# Patient Record
Sex: FEMALE | Race: WHITE | NOT HISPANIC OR LATINO | Employment: OTHER | URBAN - METROPOLITAN AREA
[De-identification: names, ages, dates, MRNs, and addresses within clinical notes are randomized per-mention and may not be internally consistent; named-entity substitution may affect disease eponyms.]

---

## 2023-04-14 LAB — THYROTROPIN (MIU/L) IN SER/PLAS BY DETECTION LIMIT <= 0.05 MIU/L: 3.2 MIU/L (ref 0.44–3.98)

## 2023-09-08 PROBLEM — S00.411A ABRASION OF RIGHT EAR CANAL: Status: ACTIVE | Noted: 2023-09-08

## 2023-09-08 PROBLEM — Z98.61 HISTORY OF PTCA: Status: ACTIVE | Noted: 2023-09-08

## 2023-09-08 PROBLEM — I00 RHEUMATIC FEVER: Status: ACTIVE | Noted: 2023-09-08

## 2023-09-08 PROBLEM — M79.674 CHRONIC PAIN OF TOE OF RIGHT FOOT: Status: ACTIVE | Noted: 2023-09-08

## 2023-09-08 PROBLEM — I25.10 CAD (CORONARY ARTERY DISEASE): Status: ACTIVE | Noted: 2023-09-08

## 2023-09-08 PROBLEM — G89.29 CHRONIC PAIN OF TOE OF RIGHT FOOT: Status: ACTIVE | Noted: 2023-09-08

## 2023-09-08 PROBLEM — M20.42 ACQUIRED HAMMERTOE OF LEFT FOOT: Status: ACTIVE | Noted: 2023-09-08

## 2023-09-08 PROBLEM — M85.80 OSTEOPENIA: Status: ACTIVE | Noted: 2023-09-08

## 2023-09-08 PROBLEM — G89.29 CHRONIC PAIN OF TOE OF LEFT FOOT: Status: ACTIVE | Noted: 2023-09-08

## 2023-09-08 PROBLEM — H61.21 IMPACTED CERUMEN OF RIGHT EAR: Status: ACTIVE | Noted: 2023-09-08

## 2023-09-08 PROBLEM — I10 HTN (HYPERTENSION): Status: ACTIVE | Noted: 2023-09-08

## 2023-09-08 PROBLEM — D64.9 ANEMIA: Status: ACTIVE | Noted: 2023-09-08

## 2023-09-08 PROBLEM — E78.5 HLD (HYPERLIPIDEMIA): Status: ACTIVE | Noted: 2023-09-08

## 2023-09-08 PROBLEM — R26.89 BALANCE DISORDER: Status: ACTIVE | Noted: 2023-09-08

## 2023-09-08 PROBLEM — M79.675 CHRONIC PAIN OF TOE OF LEFT FOOT: Status: ACTIVE | Noted: 2023-09-08

## 2023-09-08 PROBLEM — R79.89 ABNORMAL TSH: Status: ACTIVE | Noted: 2023-09-08

## 2023-09-08 PROBLEM — F41.9 ANXIETY DISORDER, UNSPECIFIED: Status: ACTIVE | Noted: 2023-09-08

## 2023-09-08 PROBLEM — Z95.2 S/P TAVR (TRANSCATHETER AORTIC VALVE REPLACEMENT): Status: ACTIVE | Noted: 2023-09-08

## 2023-09-08 PROBLEM — R42 ORTHOSTATIC DIZZINESS: Status: ACTIVE | Noted: 2023-09-08

## 2023-09-08 RX ORDER — ALPRAZOLAM 0.25 MG/1
0.25 TABLET ORAL 2 TIMES DAILY PRN
COMMUNITY
Start: 2018-03-04 | End: 2023-10-27 | Stop reason: SDUPTHER

## 2023-09-08 RX ORDER — METOPROLOL SUCCINATE 50 MG/1
1 TABLET, EXTENDED RELEASE ORAL DAILY
COMMUNITY
End: 2023-10-27 | Stop reason: ALTCHOICE

## 2023-09-08 RX ORDER — TRIAMCINOLONE ACETONIDE 1 MG/G
1 CREAM TOPICAL
COMMUNITY
Start: 2020-11-27 | End: 2023-10-27 | Stop reason: ALTCHOICE

## 2023-09-08 RX ORDER — HYDROCHLOROTHIAZIDE 25 MG/1
1 TABLET ORAL DAILY
COMMUNITY
End: 2023-10-27 | Stop reason: ALTCHOICE

## 2023-09-08 RX ORDER — LATANOPROST 50 UG/ML
1 SOLUTION/ DROPS OPHTHALMIC NIGHTLY
COMMUNITY
Start: 2017-08-13

## 2023-09-08 RX ORDER — EZETIMIBE 10 MG/1
1 TABLET ORAL NIGHTLY
COMMUNITY
Start: 2018-06-29 | End: 2023-12-28

## 2023-09-08 RX ORDER — ASPIRIN 81 MG/1
81 TABLET ORAL DAILY
COMMUNITY
Start: 2018-06-29 | End: 2024-04-11 | Stop reason: SDUPTHER

## 2023-09-08 RX ORDER — NITROGLYCERIN 0.4 MG/1
0.4 TABLET SUBLINGUAL EVERY 5 MIN PRN
COMMUNITY
Start: 2018-06-29 | End: 2024-04-26 | Stop reason: SDUPTHER

## 2023-09-08 RX ORDER — AMLODIPINE BESYLATE 5 MG/1
1 TABLET ORAL DAILY
COMMUNITY
Start: 2021-05-21 | End: 2024-01-10 | Stop reason: SDUPTHER

## 2023-09-08 RX ORDER — COLISTIN SULFATE, NEOMYCIN SULFATE, THONZONIUM BROMIDE AND HYDROCORTISONE ACETATE 3; 3.3; .5; 1 MG/ML; MG/ML; MG/ML; MG/ML
4 SUSPENSION AURICULAR (OTIC) 4 TIMES DAILY
COMMUNITY
Start: 2022-08-19 | End: 2023-10-27 | Stop reason: ALTCHOICE

## 2023-09-08 RX ORDER — HYDROCORTISONE 25 MG/ML
1 LOTION TOPICAL 2 TIMES DAILY
COMMUNITY
Start: 2019-12-05

## 2023-09-08 RX ORDER — ATORVASTATIN CALCIUM 20 MG/1
1 TABLET, FILM COATED ORAL NIGHTLY
COMMUNITY
Start: 2018-03-08 | End: 2023-12-28

## 2023-09-08 RX ORDER — VIT C/E/ZN/COPPR/LUTEIN/ZEAXAN 250MG-90MG
25 CAPSULE ORAL DAILY
COMMUNITY

## 2023-09-08 RX ORDER — DORZOLAMIDE HYDROCHLORIDE AND TIMOLOL MALEATE 20; 5 MG/ML; MG/ML
1 SOLUTION/ DROPS OPHTHALMIC 2 TIMES DAILY
COMMUNITY
Start: 2017-12-26

## 2023-09-08 RX ORDER — IRBESARTAN AND HYDROCHLOROTHIAZIDE 150; 12.5 MG/1; MG/1
1 TABLET, FILM COATED ORAL DAILY
COMMUNITY
Start: 2022-03-25 | End: 2024-03-04 | Stop reason: SDUPTHER

## 2023-10-12 ENCOUNTER — OFFICE VISIT (OUTPATIENT)
Dept: PRIMARY CARE | Facility: CLINIC | Age: 83
End: 2023-10-12
Payer: MEDICARE

## 2023-10-12 VITALS
HEIGHT: 69 IN | SYSTOLIC BLOOD PRESSURE: 113 MMHG | DIASTOLIC BLOOD PRESSURE: 49 MMHG | BODY MASS INDEX: 19.55 KG/M2 | HEART RATE: 77 BPM | TEMPERATURE: 97.2 F | WEIGHT: 132 LBS

## 2023-10-12 DIAGNOSIS — Z00.00 ENCOUNTER FOR ANNUAL WELLNESS EXAM IN MEDICARE PATIENT: Primary | ICD-10-CM

## 2023-10-12 DIAGNOSIS — D64.89 ANEMIA DUE TO OTHER CAUSE, NOT CLASSIFIED: ICD-10-CM

## 2023-10-12 DIAGNOSIS — E78.49 OTHER HYPERLIPIDEMIA: ICD-10-CM

## 2023-10-12 DIAGNOSIS — I10 PRIMARY HYPERTENSION: ICD-10-CM

## 2023-10-12 PROCEDURE — 3074F SYST BP LT 130 MM HG: CPT | Performed by: FAMILY MEDICINE

## 2023-10-12 PROCEDURE — 3078F DIAST BP <80 MM HG: CPT | Performed by: FAMILY MEDICINE

## 2023-10-12 PROCEDURE — G0439 PPPS, SUBSEQ VISIT: HCPCS | Performed by: FAMILY MEDICINE

## 2023-10-13 DIAGNOSIS — F41.9 ANXIETY: ICD-10-CM

## 2023-10-13 RX ORDER — ALPRAZOLAM 0.25 MG/1
0.25 TABLET ORAL 2 TIMES DAILY PRN
Qty: 30 TABLET | Refills: 0 | Status: CANCELLED | OUTPATIENT
Start: 2023-10-13 | End: 2023-10-28

## 2023-10-13 ASSESSMENT — ENCOUNTER SYMPTOMS
HEMATOLOGIC/LYMPHATIC NEGATIVE: 1
NEUROLOGICAL NEGATIVE: 1
CONSTITUTIONAL NEGATIVE: 1
PSYCHIATRIC NEGATIVE: 1
MUSCULOSKELETAL NEGATIVE: 1
ENDOCRINE NEGATIVE: 1
EYES NEGATIVE: 1
ALLERGIC/IMMUNOLOGIC NEGATIVE: 1
RESPIRATORY NEGATIVE: 1
CARDIOVASCULAR NEGATIVE: 1
GASTROINTESTINAL NEGATIVE: 1

## 2023-10-13 NOTE — PROGRESS NOTES
Subjective the patient is here for an annual wellness visit.  He is about 15 to 20 minutes late for her appointment.  She states that she has overall been doing well.  She continues on her meds as noted.  She sees Dr. Gerald Hughes for her cardiology care.  She also sees urology and is currently on Gemtesa for her bladder control.  She has no other complaints at the present time.    Patient ID: Betty Stein is a 83 y.o. female who presents for No chief complaint on file.:    Problem List Items Addressed This Visit    None     Past Medical History:   Diagnosis Date    Encounter for examination for normal comparison and control in clinical research program 01/21/2019    Research exam    Inflamed seborrheic keratosis 05/14/2020    Seborrheic keratoses, inflamed    Ingrowing nail 04/15/2021    Ingrown toenail    Nonrheumatic aortic (valve) stenosis 05/10/2019    Aortic stenosis, severe    Personal history of other diseases of the musculoskeletal system and connective tissue 11/11/2021    History of bone disorder      Past Surgical History:   Procedure Laterality Date    OTHER SURGICAL HISTORY  06/29/2018    Hip Replacement Bilateral    OTHER SURGICAL HISTORY  11/11/2021    Surgery    OTHER SURGICAL HISTORY  11/11/2021    Cataract surgery    OTHER SURGICAL HISTORY  11/11/2021    Appendectomy    OTHER SURGICAL HISTORY  11/11/2021    Cardiac catheterization    OTHER SURGICAL HISTORY  11/11/2021    Transcatheter aortic valve replacement    OTHER SURGICAL HISTORY  11/11/2021    Percutaneous transluminal coronary angioplasty    OTHER SURGICAL HISTORY  11/11/2021    Phacoemulsification of cataract and insertion of intraocular lens    OTHER SURGICAL HISTORY  11/18/2021    Colonoscopy      Family History   Problem Relation Name Age of Onset    Coronary artery disease Mother        Social History     Socioeconomic History    Marital status:      Spouse name: Not on file    Number of children: Not on file    Years of  education: Not on file    Highest education level: Not on file   Occupational History    Not on file   Tobacco Use    Smoking status: Not on file    Smokeless tobacco: Not on file   Substance and Sexual Activity    Alcohol use: Not on file    Drug use: Not on file    Sexual activity: Not on file   Other Topics Concern    Not on file   Social History Narrative    Not on file     Social Determinants of Health     Financial Resource Strain: Not on file   Food Insecurity: Not on file   Transportation Needs: Not on file   Physical Activity: Not on file   Stress: Not on file   Social Connections: Not on file   Intimate Partner Violence: Not on file   Housing Stability: Not on file      Patient has no known allergies.   Current Outpatient Medications   Medication Sig Dispense Refill    ALPRAZolam (Xanax) 0.25 mg tablet Take 1 tablet (0.25 mg) by mouth 2 times a day as needed.      amLODIPine (Norvasc) 5 mg tablet Take 1 tablet (5 mg) by mouth once daily.      aspirin 81 mg EC tablet Take 1 tablet (81 mg) by mouth once daily.      atorvastatin (Lipitor) 20 mg tablet Take 1 tablet (20 mg) by mouth once daily at bedtime.      carbamide peroxide (Debrox) 6.5 % otic solution Administer into affected ear(s). PER DIRECTED      cholecalciferol (Vitamin D-3) 25 MCG (1000 UT) capsule Take 1 capsule (25 mcg) by mouth once daily.      dorzolamide-timoloL (Cosopt) 22.3-6.8 mg/mL ophthalmic solution Administer 1 drop into affected eye(s) twice a day.      ezetimibe (Zetia) 10 mg tablet Take 1 tablet (10 mg) by mouth once daily at bedtime.      hydroCHLOROthiazide (HYDRODiuril) 25 mg tablet Take 1 tablet (25 mg) by mouth once daily.      hydrocortisone 2.5 % lotion Apply 1 Application topically twice a day.      irbesartan-hydrochlorothiazide (Avalide) 150-12.5 mg tablet Take 1 tablet by mouth once daily.      latanoprost (Xalatan) 0.005 % ophthalmic solution Administer 1 drop into both eyes once daily at bedtime.      metoprolol  succinate XL (Toprol-XL) 50 mg 24 hr tablet Take 1 tablet (50 mg) by mouth once daily.      multivit-min/ferrous fumarate (MULTI VITAMIN ORAL) Take 1 tablet by mouth once daily.      neomycin-colist-HC-thonzonium (Cortisporin-TC) 3.3-3-10-0.5 mg/mL otic suspension Administer 4 drops into affected ear(s) 4 times a day. PER DIRECTED      nitroglycerin (Nitrostat) 0.4 mg SL tablet Place 1 tablet (0.4 mg) under the tongue every 5 minutes if needed for chest pain. TAKE PER DIRECTED      NON FORMULARY POTASSIUM 99 MG ORAL TABLET,, TAKE 1 TAB DAILY PER DIRECTED      triamcinolone (Kenalog) 0.1 % cream Apply 1 Application topically. PER DIRECTED       No current facility-administered medications for this visit.       Immunization History   Administered Date(s) Administered    Influenza, High Dose Seasonal, Preservative Free 08/27/2020    Influenza, Unspecified 10/15/2009, 09/23/2011, 10/01/2012, 10/01/2013, 09/01/2014, 09/01/2015, 10/01/2016, 10/01/2017, 09/01/2019, 10/14/2021    Novel influenza-H1N1-09, preservative-free 01/11/2010    Pfizer Purple Cap SARS-CoV-2 01/26/2021, 02/15/2021, 09/27/2021, 09/16/2022    Pneumococcal conjugate vaccine, 13-valent (PREVNAR 13) 11/24/2015    Pneumococcal polysaccharide vaccine, 23-valent, age 2 years and older (PNEUMOVAX 23) 10/01/2012, 10/17/2014    SARS-CoV-2, Unspecified 04/30/2022    Td (adult), unspecified 06/03/2000    Zoster vaccine, recombinant, adult (SHINGRIX) 02/26/2020, 07/23/2020    Zoster, Unspecified 07/28/2000    Zoster, live 11/27/2007        Review of Systems   Constitutional: Negative.    HENT: Negative.     Eyes: Negative.    Respiratory: Negative.     Cardiovascular: Negative.    Gastrointestinal: Negative.    Endocrine: Negative.    Musculoskeletal: Negative.    Allergic/Immunologic: Negative.    Neurological: Negative.    Hematological: Negative.    Psychiatric/Behavioral: Negative.     All other systems reviewed and are negative.       Vitals:    10/12/23 1053    BP: (!) 113/49   Pulse: 77   Temp: 36.2 °C (97.2 °F)     Vitals:    10/12/23 1053   Weight: 59.9 kg (132 lb)      Physical Exam  Constitutional:       General: She is not in acute distress.     Appearance: Normal appearance.   Neck:      Vascular: No carotid bruit.   Cardiovascular:      Rate and Rhythm: Normal rate and regular rhythm.      Pulses: Normal pulses.      Heart sounds: Normal heart sounds.   Pulmonary:      Effort: Pulmonary effort is normal.      Breath sounds: Normal breath sounds.   Musculoskeletal:      Cervical back: Neck supple.   Neurological:      Mental Status: She is alert.     Ears-there is cerumen in both ear canals which does not appear totally obstructing.  Patient states her hearing has been normal    ASSESSMENT/PLAN: Annual wellness visit  Hypertension stable  Hyperlipidemia  Chronic anxiety disorder  Urinary frequency improved    Continue current meds noted  Check CBC CMP lipid profile and TSH  Mammograms are up-to-date   Follow-up with cardiology as scheduled  Exercise regularly  Follow-up in 6 months and call as needed

## 2023-10-16 DIAGNOSIS — D64.9 ANEMIA, UNSPECIFIED TYPE: Primary | ICD-10-CM

## 2023-10-16 DIAGNOSIS — E78.49 OTHER HYPERLIPIDEMIA: ICD-10-CM

## 2023-10-27 ENCOUNTER — OFFICE VISIT (OUTPATIENT)
Dept: CARDIOLOGY | Facility: CLINIC | Age: 83
End: 2023-10-27
Payer: MEDICARE

## 2023-10-27 ENCOUNTER — TELEPHONE (OUTPATIENT)
Dept: CARDIOLOGY | Facility: CLINIC | Age: 83
End: 2023-10-27

## 2023-10-27 VITALS
TEMPERATURE: 98 F | HEART RATE: 80 BPM | HEIGHT: 69 IN | BODY MASS INDEX: 19.85 KG/M2 | WEIGHT: 134 LBS | SYSTOLIC BLOOD PRESSURE: 103 MMHG | DIASTOLIC BLOOD PRESSURE: 57 MMHG

## 2023-10-27 DIAGNOSIS — F41.1 GENERALIZED ANXIETY DISORDER: Primary | ICD-10-CM

## 2023-10-27 DIAGNOSIS — I25.10 CORONARY ARTERY DISEASE INVOLVING NATIVE CORONARY ARTERY OF NATIVE HEART WITHOUT ANGINA PECTORIS: ICD-10-CM

## 2023-10-27 DIAGNOSIS — E78.2 MIXED HYPERLIPIDEMIA: ICD-10-CM

## 2023-10-27 DIAGNOSIS — Z95.2 S/P TAVR (TRANSCATHETER AORTIC VALVE REPLACEMENT): Primary | ICD-10-CM

## 2023-10-27 DIAGNOSIS — I10 PRIMARY HYPERTENSION: ICD-10-CM

## 2023-10-27 DIAGNOSIS — Z78.9 NEVER SMOKED ANY SUBSTANCE: ICD-10-CM

## 2023-10-27 DIAGNOSIS — Z98.61 HISTORY OF PTCA: ICD-10-CM

## 2023-10-27 PROCEDURE — 3078F DIAST BP <80 MM HG: CPT | Performed by: INTERNAL MEDICINE

## 2023-10-27 PROCEDURE — 1036F TOBACCO NON-USER: CPT | Performed by: INTERNAL MEDICINE

## 2023-10-27 PROCEDURE — 1160F RVW MEDS BY RX/DR IN RCRD: CPT | Performed by: INTERNAL MEDICINE

## 2023-10-27 PROCEDURE — 1159F MED LIST DOCD IN RCRD: CPT | Performed by: INTERNAL MEDICINE

## 2023-10-27 PROCEDURE — 99214 OFFICE O/P EST MOD 30 MIN: CPT | Performed by: INTERNAL MEDICINE

## 2023-10-27 PROCEDURE — 3074F SYST BP LT 130 MM HG: CPT | Performed by: INTERNAL MEDICINE

## 2023-10-27 RX ORDER — VIBEGRON 75 MG/1
1 TABLET, FILM COATED ORAL DAILY
COMMUNITY
Start: 2023-09-22

## 2023-10-27 RX ORDER — ALPRAZOLAM 0.25 MG/1
0.25 TABLET ORAL 2 TIMES DAILY PRN
Qty: 20 TABLET | Refills: 0 | Status: SHIPPED | OUTPATIENT
Start: 2023-10-27 | End: 2024-04-11 | Stop reason: SDUPTHER

## 2023-10-27 ASSESSMENT — ENCOUNTER SYMPTOMS
FATIGUE: 1
ARTHRALGIAS: 1
FREQUENCY: 1
APPETITE CHANGE: 0
GASTROINTESTINAL NEGATIVE: 1
ACTIVITY CHANGE: 1
DYSURIA: 0
DIZZINESS: 0
CARDIOVASCULAR NEGATIVE: 1
BACK PAIN: 1
SLEEP DISTURBANCE: 1

## 2023-10-27 NOTE — TELEPHONE ENCOUNTER
Pt was here for an appt with dr ramsey.  She wanted to request a written rx for alprazolam to take to the pharmacy. Informed pt we normally send it electronically to their preferred pharmacy but she said she would prefer to have a written prescription. Please advise

## 2023-10-27 NOTE — PATIENT INSTRUCTIONS
Monitor blood pressures. If always < 110/90 let me know and will get rid of the hydrochlorothiazide with your irbesartan  Follow-up in 6 months if problems.

## 2023-10-27 NOTE — TELEPHONE ENCOUNTER
Sp to pt's . He said that is fine and wanted to know if could please be sent to [facility] in [place]

## 2023-10-27 NOTE — PROGRESS NOTES
Patient:  Betty Stein  YOB: 1940  MRN: [id number]       Chief Complaint/Active Symptoms:       Betty Stein is a 83 y.o. female who returns today for cardiac follow-up.    Here for routine follow-up. Getting ready to move in 2 years to [facility] in independent living. Having to clear out stuff from 20 years of their home. Having help from the children.    Having problems with arthritis and mostly her back. No chest pain or shortness or breath. No edema. No dizziness or lightheadedness.     Review of Systems   Constitutional:  Positive for activity change and fatigue. Negative for appetite change.   Cardiovascular: Negative.    Gastrointestinal: Negative.    Genitourinary:  Positive for frequency. Negative for dysuria.   Musculoskeletal:  Positive for arthralgias and back pain.   Neurological:  Negative for dizziness and syncope.   Psychiatric/Behavioral:  Positive for sleep disturbance.        Objective:     Vitals:    10/27/23 1116   BP: 103/57   Pulse: 80   Temp: 36.7 °C (98 °F)       Vitals:    10/27/23 1116   Weight: 60.8 kg (134 lb)       Allergies:     No Known Allergies       Medications:     Current Outpatient Medications   Medication Instructions    ALPRAZolam (XANAX) 0.25 mg, oral, 2 times daily PRN    amLODIPine (Norvasc) 5 mg tablet 1 tablet, oral, Daily    aspirin 81 mg, oral, Daily    atorvastatin (Lipitor) 20 mg tablet 1 tablet, oral, Nightly    cholecalciferol (VITAMIN D-3) 25 mcg, oral, Daily    dorzolamide-timoloL (Cosopt) 22.3-6.8 mg/mL ophthalmic solution 1 drop, ophthalmic (eye), 2 times daily    ezetimibe (Zetia) 10 mg tablet 1 tablet, oral, Nightly    Gemtesa 75 mg tablet 1 tablet, oral, Daily    hydrocortisone 2.5 % lotion 1 Application, Topical, 2 times daily    irbesartan-hydrochlorothiazide (Avalide) 150-12.5 mg tablet 1 tablet, oral, Daily    latanoprost (Xalatan) 0.005 % ophthalmic solution 1 drop, Both Eyes, Nightly    multivit-min/ferrous fumarate (MULTI  "VITAMIN ORAL) 1 tablet, oral, Daily    nitroglycerin (NITROSTAT) 0.4 mg, sublingual, Every 5 min PRN, TAKE PER DIRECTED    NON FORMULARY POTASSIUM 99 MG ORAL TABLET,, TAKE 1 TAB DAILY PER DIRECTED       Physical Examination:   GENERAL:  Well developed, well nourished, in no acute distress.  HEENT: NC AT, EOMI with anicteric sclera  NECK: Range of motion, no JVD, no bruit.  CHEST:  Symmetric and nontender.  LUNGS:  Clear to auscultation bilaterally, normal respiratory effort.  HEART: Nondisplaced.  There is a regular rhythm with a normal S1 and S2 no S3.  There is a soft systolic ejection murmur at the upper sternal border no diastolic murmur.    ABDOMEN: Soft, NT, ND without palpable organomegaly or bruits  EXTREMITIES:  Warm with good color, no clubbing or cyanosis.  There is no edema noted.  PERIPHERAL VASCULAR:  Pulses present and equally palpable; pedal's but feet are warm with normal cap refill   MUSCULOSKELETAL: No changes  NEURO/PSYCH:  Alert and oriented times three with approppriate behavior and responses. Nonfocal motor examination with normal gait and ambulation  Lymph: No significant palpable lymphadenopathy  Skin: no rash or lesions on exposed skin or reported.    Lab:     CBC:   Lab Results   Component Value Date    WBC 6.9 09/09/2022    RBC 4.80 09/09/2022    HGB 14.9 09/09/2022    HCT 46.6 (H) 09/09/2022     09/09/2022        CMP:    Lab Results   Component Value Date     09/09/2022    K 4.0 09/09/2022     09/09/2022    CO2 30 09/09/2022    BUN 31 (H) 09/09/2022    CREATININE 1.06 (H) 09/09/2022    GLUCOSE 102 (H) 09/09/2022    CALCIUM 9.7 09/09/2022       Magnesium:    No results found for: \"MG\"    Lipid Profile:    Lab Results   Component Value Date    TRIG 131 09/09/2022    HDL 62.0 09/09/2022       TSH:    Lab Results   Component Value Date    TSH 3.20 04/14/2023       BNP:   Lab Results   Component Value Date    BNP 97 08/20/2020        PT/INR:    Lab Results   Component Value " "Date    PROTIME 11.5 01/10/2022    INR 1.0 01/10/2022       HgBA1c:    No results found for: \"HGBA1C\"    BMP:  Lab Results   Component Value Date     09/09/2022     01/10/2022     10/15/2021    K 4.0 09/09/2022    K 4.0 01/10/2022    K 4.3 10/15/2021     09/09/2022     01/10/2022     10/15/2021    CO2 30 09/09/2022    CO2 25 01/10/2022    CO2 30 10/15/2021    BUN 31 (H) 09/09/2022    BUN 39 (H) 01/10/2022    BUN 34 (H) 10/15/2021    CREATININE 1.06 (H) 09/09/2022    CREATININE 1.11 (H) 01/10/2022    CREATININE 1.17 (H) 10/15/2021       Cardiac Enzymes:    No results found for: \"TROPHS\"    Hepatic Function Panel:    Lab Results   Component Value Date    ALKPHOS 60 09/09/2022    ALT 17 09/09/2022    AST 24 09/09/2022    PROT 7.6 09/09/2022    BILITOT 0.8 09/09/2022         Diagnostic Studies:     No echocardiogram results found for the past 12 months    No nuclear medicine results found for the past 12 months    No valid procedures specified.    EKG:   No results found for: \"EKG\"    Radiology:     No orders to display         ASSESSMENT     Problem List Items Addressed This Visit       CAD (coronary artery disease)    History of PTCA    HLD (hyperlipidemia)    HTN (hypertension)    S/P TAVR (transcatheter aortic valve replacement) - Primary    Never smoked any substance     Other Visit Diagnoses       Body mass index (BMI) of 19.0-19.9 in adult                PLAN   1.  Coronary artery disease with history of coronary intervention stable without angina pectoris continue conservative medical therapy and risk factor modification.  2.  History of aortic stenosis status post TAVR continue with antibiotic prophylaxis.  We will repeat her echocardiogram with her next visit.  3.  Hypertension.  Blood pressures are actually little on the low side if they remain low we will get rid of the hydrochlorothiazide and then consider decreasing the dose of her amlodipine if needed.  4.  " Hyperlipidemia.  Patient is overdue for lab work including her lipid panel continue her statins at the current dose in the interim.  5.  Tobacco and weight status.  Patient is a non-smoker and at the lower end of her ideal body weight.    We have asked her to get her lab work and cannot renew her medications until she is done so as has been greater than a year and if she is stable we will see her in follow-up in 6 months

## 2023-10-30 ENCOUNTER — LAB (OUTPATIENT)
Dept: LAB | Facility: LAB | Age: 83
End: 2023-10-30
Payer: MEDICARE

## 2023-10-30 DIAGNOSIS — Z00.00 ENCOUNTER FOR ANNUAL WELLNESS EXAM IN MEDICARE PATIENT: ICD-10-CM

## 2023-10-30 DIAGNOSIS — D64.9 ANEMIA, UNSPECIFIED TYPE: ICD-10-CM

## 2023-10-30 DIAGNOSIS — D64.89 ANEMIA DUE TO OTHER CAUSE, NOT CLASSIFIED: ICD-10-CM

## 2023-10-30 DIAGNOSIS — I10 PRIMARY HYPERTENSION: ICD-10-CM

## 2023-10-30 DIAGNOSIS — E78.49 OTHER HYPERLIPIDEMIA: ICD-10-CM

## 2023-10-30 LAB
ALBUMIN SERPL BCP-MCNC: 4 G/DL (ref 3.4–5)
ALP SERPL-CCNC: 67 U/L (ref 33–136)
ALT SERPL W P-5'-P-CCNC: 49 U/L (ref 7–45)
ANION GAP SERPL CALC-SCNC: 13 MMOL/L (ref 10–20)
AST SERPL W P-5'-P-CCNC: 48 U/L (ref 9–39)
BILIRUB SERPL-MCNC: 0.6 MG/DL (ref 0–1.2)
BUN SERPL-MCNC: 21 MG/DL (ref 6–23)
CALCIUM SERPL-MCNC: 9.3 MG/DL (ref 8.6–10.3)
CHLORIDE SERPL-SCNC: 99 MMOL/L (ref 98–107)
CHOLEST SERPL-MCNC: 164 MG/DL (ref 0–199)
CHOLESTEROL/HDL RATIO: 3.3
CO2 SERPL-SCNC: 28 MMOL/L (ref 21–32)
CREAT SERPL-MCNC: 1.08 MG/DL (ref 0.5–1.05)
ERYTHROCYTE [DISTWIDTH] IN BLOOD BY AUTOMATED COUNT: 12.2 % (ref 11.5–14.5)
GFR SERPL CREATININE-BSD FRML MDRD: 51 ML/MIN/1.73M*2
GLUCOSE SERPL-MCNC: 111 MG/DL (ref 74–99)
HCT VFR BLD AUTO: 41.6 % (ref 36–46)
HDLC SERPL-MCNC: 49 MG/DL
HGB BLD-MCNC: 13.4 G/DL (ref 12–16)
LDLC SERPL CALC-MCNC: 88 MG/DL
MCH RBC QN AUTO: 30.6 PG (ref 26–34)
MCHC RBC AUTO-ENTMCNC: 32.2 G/DL (ref 32–36)
MCV RBC AUTO: 95 FL (ref 80–100)
NON HDL CHOLESTEROL: 115 MG/DL (ref 0–149)
NRBC BLD-RTO: 0 /100 WBCS (ref 0–0)
PLATELET # BLD AUTO: 177 X10*3/UL (ref 150–450)
PMV BLD AUTO: 11 FL (ref 7.5–11.5)
POTASSIUM SERPL-SCNC: 3.6 MMOL/L (ref 3.5–5.3)
PROT SERPL-MCNC: 6.7 G/DL (ref 6.4–8.2)
RBC # BLD AUTO: 4.38 X10*6/UL (ref 4–5.2)
SODIUM SERPL-SCNC: 136 MMOL/L (ref 136–145)
T4 FREE SERPL-MCNC: 0.94 NG/DL (ref 0.61–1.12)
TRIGL SERPL-MCNC: 134 MG/DL (ref 0–149)
TSH SERPL-ACNC: 7.27 MIU/L (ref 0.44–3.98)
VLDL: 27 MG/DL (ref 0–40)
WBC # BLD AUTO: 8.3 X10*3/UL (ref 4.4–11.3)

## 2023-10-30 PROCEDURE — 84443 ASSAY THYROID STIM HORMONE: CPT

## 2023-10-30 PROCEDURE — 85027 COMPLETE CBC AUTOMATED: CPT

## 2023-10-30 PROCEDURE — 36415 COLL VENOUS BLD VENIPUNCTURE: CPT

## 2023-10-30 PROCEDURE — 84439 ASSAY OF FREE THYROXINE: CPT

## 2023-10-30 PROCEDURE — 80053 COMPREHEN METABOLIC PANEL: CPT

## 2023-10-30 PROCEDURE — 80061 LIPID PANEL: CPT

## 2023-10-31 ENCOUNTER — TELEPHONE (OUTPATIENT)
Dept: PRIMARY CARE | Facility: CLINIC | Age: 83
End: 2023-10-31
Payer: MEDICARE

## 2023-10-31 NOTE — TELEPHONE ENCOUNTER
----- Message from Ronald Lowry MD sent at 10/30/2023  4:42 PM EDT -----  Thyroid level low--sched OV 1-2 weeks

## 2023-11-01 ENCOUNTER — TELEPHONE (OUTPATIENT)
Dept: PRIMARY CARE | Facility: CLINIC | Age: 83
End: 2023-11-01
Payer: MEDICARE

## 2023-11-01 NOTE — TELEPHONE ENCOUNTER
Patient would like to change her medications for her thyroid. She would like to get on something to correct her low thyroid. She is currently scheduled for December 7th but would like to start on a medication sooner than that.

## 2023-11-06 ENCOUNTER — OFFICE VISIT (OUTPATIENT)
Dept: PRIMARY CARE | Facility: CLINIC | Age: 83
End: 2023-11-06
Payer: MEDICARE

## 2023-11-06 VITALS
TEMPERATURE: 97.5 F | SYSTOLIC BLOOD PRESSURE: 99 MMHG | HEIGHT: 69 IN | DIASTOLIC BLOOD PRESSURE: 53 MMHG | WEIGHT: 134.8 LBS | HEART RATE: 74 BPM | BODY MASS INDEX: 19.96 KG/M2

## 2023-11-06 DIAGNOSIS — R79.89 ABNORMAL TSH: Primary | ICD-10-CM

## 2023-11-06 DIAGNOSIS — E03.9 ACQUIRED HYPOTHYROIDISM: ICD-10-CM

## 2023-11-06 PROCEDURE — 1160F RVW MEDS BY RX/DR IN RCRD: CPT | Performed by: FAMILY MEDICINE

## 2023-11-06 PROCEDURE — 99213 OFFICE O/P EST LOW 20 MIN: CPT | Performed by: FAMILY MEDICINE

## 2023-11-06 PROCEDURE — 3074F SYST BP LT 130 MM HG: CPT | Performed by: FAMILY MEDICINE

## 2023-11-06 PROCEDURE — 3078F DIAST BP <80 MM HG: CPT | Performed by: FAMILY MEDICINE

## 2023-11-06 PROCEDURE — 1036F TOBACCO NON-USER: CPT | Performed by: FAMILY MEDICINE

## 2023-11-06 PROCEDURE — 1159F MED LIST DOCD IN RCRD: CPT | Performed by: FAMILY MEDICINE

## 2023-11-06 ASSESSMENT — PATIENT HEALTH QUESTIONNAIRE - PHQ9
1. LITTLE INTEREST OR PLEASURE IN DOING THINGS: NOT AT ALL
2. FEELING DOWN, DEPRESSED OR HOPELESS: NOT AT ALL
SUM OF ALL RESPONSES TO PHQ9 QUESTIONS 1 AND 2: 0

## 2023-11-07 ASSESSMENT — ENCOUNTER SYMPTOMS
CARDIOVASCULAR NEGATIVE: 1
GASTROINTESTINAL NEGATIVE: 1
RESPIRATORY NEGATIVE: 1
HEMATOLOGIC/LYMPHATIC NEGATIVE: 1
ENDOCRINE NEGATIVE: 1
ALLERGIC/IMMUNOLOGIC NEGATIVE: 1
EYES NEGATIVE: 1
PSYCHIATRIC NEGATIVE: 1
CONSTITUTIONAL NEGATIVE: 1
MUSCULOSKELETAL NEGATIVE: 1
NEUROLOGICAL NEGATIVE: 1

## 2023-11-07 NOTE — PROGRESS NOTES
Subjective Betty Dwyer is here today for a discussion regarding hypothyroidism.  She states that she is under a lot of stress because of an upcoming move to another home.  She does feel somewhat tired but she has been very busy with the moving preparations.  She has had no significant weight changes.  She overall has no complaints today.  She continues on her meds noted.    Patient ID: Betty Stein is a 83 y.o. female who presents for Follow-up (Thyroid ):    Problem List Items Addressed This Visit    None     Past Medical History:   Diagnosis Date    Encounter for examination for normal comparison and control in clinical research program 01/21/2019    Research exam    Inflamed seborrheic keratosis 05/14/2020    Seborrheic keratoses, inflamed    Ingrowing nail 04/15/2021    Ingrown toenail    Nonrheumatic aortic (valve) stenosis 05/10/2019    Aortic stenosis, severe    Personal history of other diseases of the musculoskeletal system and connective tissue 11/11/2021    History of bone disorder      Past Surgical History:   Procedure Laterality Date    OTHER SURGICAL HISTORY  06/29/2018    Hip Replacement Bilateral    OTHER SURGICAL HISTORY  11/11/2021    Surgery    OTHER SURGICAL HISTORY  11/11/2021    Cataract surgery    OTHER SURGICAL HISTORY  11/11/2021    Appendectomy    OTHER SURGICAL HISTORY  11/11/2021    Cardiac catheterization    OTHER SURGICAL HISTORY  11/11/2021    Transcatheter aortic valve replacement    OTHER SURGICAL HISTORY  11/11/2021    Percutaneous transluminal coronary angioplasty    OTHER SURGICAL HISTORY  11/11/2021    Phacoemulsification of cataract and insertion of intraocular lens    OTHER SURGICAL HISTORY  11/18/2021    Colonoscopy      Family History   Problem Relation Name Age of Onset    Coronary artery disease Mother        Social History     Socioeconomic History    Marital status:      Spouse name: Not on file    Number of children: Not on file    Years of education: Not on  file    Highest education level: Not on file   Occupational History    Not on file   Tobacco Use    Smoking status: Never    Smokeless tobacco: Never   Substance and Sexual Activity    Alcohol use: Yes     Alcohol/week: 1.0 standard drink of alcohol     Types: 1 Standard drinks or equivalent per week    Drug use: Not Currently    Sexual activity: Not on file   Other Topics Concern    Not on file   Social History Narrative    Not on file     Social Determinants of Health     Financial Resource Strain: Not on file   Food Insecurity: Not on file   Transportation Needs: Not on file   Physical Activity: Not on file   Stress: Not on file   Social Connections: Not on file   Intimate Partner Violence: Not on file   Housing Stability: Not on file      Patient has no known allergies.   Current Outpatient Medications   Medication Sig Dispense Refill    ALPRAZolam (Xanax) 0.25 mg tablet Take 1 tablet (0.25 mg) by mouth 2 times a day as needed for anxiety. 20 tablet 0    amLODIPine (Norvasc) 5 mg tablet Take 1 tablet (5 mg) by mouth once daily.      aspirin 81 mg EC tablet Take 1 tablet (81 mg) by mouth once daily.      atorvastatin (Lipitor) 20 mg tablet Take 1 tablet (20 mg) by mouth once daily at bedtime.      cholecalciferol (Vitamin D-3) 25 MCG (1000 UT) capsule Take 1 capsule (25 mcg) by mouth once daily.      dorzolamide-timoloL (Cosopt) 22.3-6.8 mg/mL ophthalmic solution Administer 1 drop into affected eye(s) twice a day.      ezetimibe (Zetia) 10 mg tablet Take 1 tablet (10 mg) by mouth once daily at bedtime.      Gemtesa 75 mg tablet Take 1 tablet (75 mg) by mouth once daily.      hydrocortisone 2.5 % lotion Apply 1 Application topically twice a day.      irbesartan-hydrochlorothiazide (Avalide) 150-12.5 mg tablet Take 1 tablet by mouth once daily.      latanoprost (Xalatan) 0.005 % ophthalmic solution Administer 1 drop into both eyes once daily at bedtime.      multivit-min/ferrous fumarate (MULTI VITAMIN ORAL) Take 1  tablet by mouth once daily.      nitroglycerin (Nitrostat) 0.4 mg SL tablet Place 1 tablet (0.4 mg) under the tongue every 5 minutes if needed for chest pain. TAKE PER DIRECTED      NON FORMULARY POTASSIUM 99 MG ORAL TABLET,, TAKE 1 TAB DAILY PER DIRECTED       No current facility-administered medications for this visit.       Immunization History   Administered Date(s) Administered    Flu vaccine, quadrivalent, high-dose, preservative free, age 65y+ (FLUZONE) 09/22/2023    Influenza, High Dose Seasonal, Preservative Free 08/27/2020    Influenza, Unspecified 10/15/2009, 09/23/2011, 10/01/2012, 10/01/2013, 09/01/2014, 09/01/2015, 10/01/2016, 10/01/2017, 09/01/2019, 10/14/2021    Novel influenza-H1N1-09, preservative-free 01/11/2010    Pfizer Gray Cap SARS-CoV-2 10/15/2023    Pfizer Purple Cap SARS-CoV-2 01/26/2021, 02/15/2021, 09/27/2021, 09/16/2022    Pneumococcal conjugate vaccine, 13-valent (PREVNAR 13) 11/24/2015    Pneumococcal polysaccharide vaccine, 23-valent, age 2 years and older (PNEUMOVAX 23) 10/01/2012, 10/17/2014    SARS-CoV-2, Unspecified 04/30/2022    Td (adult), unspecified 06/03/2000    Zoster vaccine, recombinant, adult (SHINGRIX) 02/26/2020, 07/23/2020    Zoster, Unspecified 07/28/2000    Zoster, live 11/27/2007        Review of Systems   Constitutional: Negative.    HENT: Negative.     Eyes: Negative.    Respiratory: Negative.     Cardiovascular: Negative.    Gastrointestinal: Negative.    Endocrine: Negative.    Genitourinary: Negative.    Musculoskeletal: Negative.    Skin: Negative.    Allergic/Immunologic: Negative.    Neurological: Negative.    Hematological: Negative.    Psychiatric/Behavioral: Negative.          Vitals:    11/06/23 1646   BP: 99/53   Pulse: 74   Temp: 36.4 °C (97.5 °F)     Vitals:    11/06/23 1646   Weight: 61.1 kg (134 lb 12.8 oz)      Physical Exam  Constitutional:       General: She is not in acute distress.     Appearance: Normal appearance.   Neurological:      Mental  Status: She is alert.   Psychiatric:         Mood and Affect: Mood normal.         Thought Content: Thought content normal.          ASSESSMENT/PLAN: Mild hypothyroid with TSH 7.27    Plan-we discussed beginning levothyroxine replacement therapy.  Because the patient is basically asymptomatic at this time we will not begin this at the present time but recheck her thyroid labs in about 2 months.  Call if any worsening of her fatigue.  Continue current meds  Follow-up pending above and call as needed

## 2023-11-28 ENCOUNTER — PROCEDURE VISIT (OUTPATIENT)
Dept: PODIATRY | Facility: CLINIC | Age: 83
End: 2023-11-28
Payer: MEDICARE

## 2023-11-28 DIAGNOSIS — B35.1 ONYCHOMYCOSIS: ICD-10-CM

## 2023-11-28 DIAGNOSIS — G89.29 CHRONIC PAIN OF TOE OF RIGHT FOOT: ICD-10-CM

## 2023-11-28 DIAGNOSIS — M79.674 CHRONIC PAIN OF TOE OF RIGHT FOOT: ICD-10-CM

## 2023-11-28 DIAGNOSIS — M79.675 CHRONIC PAIN OF TOE OF LEFT FOOT: Primary | ICD-10-CM

## 2023-11-28 DIAGNOSIS — G89.29 CHRONIC PAIN OF TOE OF LEFT FOOT: Primary | ICD-10-CM

## 2023-11-28 PROCEDURE — 11721 DEBRIDE NAIL 6 OR MORE: CPT | Performed by: PODIATRIST

## 2023-11-28 NOTE — PROGRESS NOTES
History Of Present Illness  Betty Stein is a 83 y.o. female presenting with painful elongated nails.     Past Medical History  She has a past medical history of Encounter for examination for normal comparison and control in clinical research program (01/21/2019), Inflamed seborrheic keratosis (05/14/2020), Ingrowing nail (04/15/2021), Nonrheumatic aortic (valve) stenosis (05/10/2019), and Personal history of other diseases of the musculoskeletal system and connective tissue (11/11/2021).    Surgical History  She has a past surgical history that includes Other surgical history (06/29/2018); Other surgical history (11/11/2021); Other surgical history (11/11/2021); Other surgical history (11/11/2021); Other surgical history (11/11/2021); Other surgical history (11/11/2021); Other surgical history (11/11/2021); Other surgical history (11/11/2021); and Other surgical history (11/18/2021).     Social History  She reports that she has never smoked. She has never used smokeless tobacco. She reports current alcohol use of about 1.0 standard drink of alcohol per week. She reports that she does not currently use drugs.    Family History  Family History   Problem Relation Name Age of Onset    Coronary artery disease Mother          Allergies  Patient has no known allergies.    Medications  Current Outpatient Medications   Medication Sig Dispense Refill    ALPRAZolam (Xanax) 0.25 mg tablet Take 1 tablet (0.25 mg) by mouth 2 times a day as needed for anxiety. 20 tablet 0    amLODIPine (Norvasc) 5 mg tablet Take 1 tablet (5 mg) by mouth once daily.      aspirin 81 mg EC tablet Take 1 tablet (81 mg) by mouth once daily.      atorvastatin (Lipitor) 20 mg tablet Take 1 tablet (20 mg) by mouth once daily at bedtime.      cholecalciferol (Vitamin D-3) 25 MCG (1000 UT) capsule Take 1 capsule (25 mcg) by mouth once daily.      dorzolamide-timoloL (Cosopt) 22.3-6.8 mg/mL ophthalmic solution Administer 1 drop into affected eye(s)  twice a day.      ezetimibe (Zetia) 10 mg tablet Take 1 tablet (10 mg) by mouth once daily at bedtime.      Gemtesa 75 mg tablet Take 1 tablet (75 mg) by mouth once daily.      hydrocortisone 2.5 % lotion Apply 1 Application topically twice a day.      irbesartan-hydrochlorothiazide (Avalide) 150-12.5 mg tablet Take 1 tablet by mouth once daily.      latanoprost (Xalatan) 0.005 % ophthalmic solution Administer 1 drop into both eyes once daily at bedtime.      multivit-min/ferrous fumarate (MULTI VITAMIN ORAL) Take 1 tablet by mouth once daily.      nitroglycerin (Nitrostat) 0.4 mg SL tablet Place 1 tablet (0.4 mg) under the tongue every 5 minutes if needed for chest pain. TAKE PER DIRECTED      NON FORMULARY POTASSIUM 99 MG ORAL TABLET,, TAKE 1 TAB DAILY PER DIRECTED       No current facility-administered medications for this visit.       Review of Systems    REVIEW OF SYSTEMS  GENERAL:  Negative for malaise, significant weight loss, fever  CARDIOVASCULAR: leg swelling   MUSCULOSKELETAL:  Negative for joint pain or swelling, back pain, and muscle pain.  SKIN:  Negative for lesions, rash, and itching  PSYCH:  Negative for sleep disturbance, mood disorder and recent psychosocial stressors  NEURO: Negative, denies any burning, tingling or numbness     Objective:   Vasc: DP and PT pulses are palpable bilateral.  CFT is less than 3 seconds bilateral.  Skin temperature is warm to cool proximal to distal bilateral.      Neuro:  Light touch is intact to the foot bilateral.      Derm: Nails 1-5 bilateral are thickened, elongated and crumbly with subungual debris. Skin is supple with normal texture and turgor noted.  Webspaces are clean, dry and intact bilateral.  There are no hyperkeratoses, ulcerations, verruca or other lesions noted.      Ortho: Muscle strength is 5/5 for all pedal groups tested.  Ankle joint, subtalar joint, 1st MPJ and lesser MPJ ROM is full and without pain or crepitus.  The foot type is rectus  bilateral off weight bearing.  There are no structural deformities noted.    Assessment/Plan     Diagnoses and all orders for this visit:  Chronic pain of toe of left foot  Chronic pain of toe of right foot  Onychomycosis      Toenails are debrided in length and thickness to avoid infection and for pain relief  Pamela Barraza-Magen, DPM  [address] [id number]             Kierra Calvo, [facility]

## 2023-12-07 ENCOUNTER — APPOINTMENT (OUTPATIENT)
Dept: PRIMARY CARE | Facility: CLINIC | Age: 83
End: 2023-12-07
Payer: MEDICARE

## 2023-12-25 DIAGNOSIS — E78.2 MIXED HYPERLIPIDEMIA: Primary | ICD-10-CM

## 2023-12-28 RX ORDER — ATORVASTATIN CALCIUM 20 MG/1
20 TABLET, FILM COATED ORAL NIGHTLY
Qty: 90 TABLET | Refills: 1 | Status: SHIPPED | OUTPATIENT
Start: 2023-12-28

## 2023-12-28 RX ORDER — EZETIMIBE 10 MG/1
10 TABLET ORAL NIGHTLY
Qty: 90 TABLET | Refills: 0 | Status: SHIPPED | OUTPATIENT
Start: 2023-12-28 | End: 2024-03-25

## 2024-01-10 DIAGNOSIS — I10 ESSENTIAL HYPERTENSION: Primary | ICD-10-CM

## 2024-01-10 RX ORDER — AMLODIPINE BESYLATE 5 MG/1
5 TABLET ORAL DAILY
Qty: 90 TABLET | Refills: 3 | Status: SHIPPED | OUTPATIENT
Start: 2024-01-10 | End: 2025-01-09

## 2024-02-01 ENCOUNTER — PROCEDURE VISIT (OUTPATIENT)
Dept: PODIATRY | Facility: CLINIC | Age: 84
End: 2024-02-01
Payer: MEDICARE

## 2024-02-01 DIAGNOSIS — M79.674 CHRONIC PAIN OF TOE OF RIGHT FOOT: Primary | ICD-10-CM

## 2024-02-01 DIAGNOSIS — M79.675 CHRONIC PAIN OF TOE OF LEFT FOOT: ICD-10-CM

## 2024-02-01 DIAGNOSIS — G89.29 CHRONIC PAIN OF TOE OF LEFT FOOT: ICD-10-CM

## 2024-02-01 DIAGNOSIS — B35.1 ONYCHOMYCOSIS: ICD-10-CM

## 2024-02-01 DIAGNOSIS — G89.29 CHRONIC PAIN OF TOE OF RIGHT FOOT: Primary | ICD-10-CM

## 2024-02-01 PROCEDURE — 11721 DEBRIDE NAIL 6 OR MORE: CPT | Performed by: PODIATRIST

## 2024-02-01 NOTE — PROGRESS NOTES
History Of Present Illness  Betty Stein is a 84 y.o. female presenting with painful elongated nails.     Past Medical History  She has a past medical history of Encounter for examination for normal comparison and control in clinical research program (01/21/2019), Inflamed seborrheic keratosis (05/14/2020), Ingrowing nail (04/15/2021), Nonrheumatic aortic (valve) stenosis (05/10/2019), and Personal history of other diseases of the musculoskeletal system and connective tissue (11/11/2021).    Surgical History  She has a past surgical history that includes Other surgical history (06/29/2018); Other surgical history (11/11/2021); Other surgical history (11/11/2021); Other surgical history (11/11/2021); Other surgical history (11/11/2021); Other surgical history (11/11/2021); Other surgical history (11/11/2021); Other surgical history (11/11/2021); and Other surgical history (11/18/2021).     Social History  She reports that she has never smoked. She has never used smokeless tobacco. She reports current alcohol use of about 1.0 standard drink of alcohol per week. She reports that she does not currently use drugs.    Family History  Family History   Problem Relation Name Age of Onset    Coronary artery disease Mother          Allergies  Patient has no known allergies.    Medications  Current Outpatient Medications   Medication Sig Dispense Refill    ALPRAZolam (Xanax) 0.25 mg tablet Take 1 tablet (0.25 mg) by mouth 2 times a day as needed for anxiety. 20 tablet 0    amLODIPine (Norvasc) 5 mg tablet Take 1 tablet (5 mg) by mouth once daily. 90 tablet 3    aspirin 81 mg EC tablet Take 1 tablet (81 mg) by mouth once daily.      atorvastatin (Lipitor) 20 mg tablet TAKE 1 TABLET AT BEDTIME 90 tablet 1    cholecalciferol (Vitamin D-3) 25 MCG (1000 UT) capsule Take 1 capsule (25 mcg) by mouth once daily.      dorzolamide-timoloL (Cosopt) 22.3-6.8 mg/mL ophthalmic solution Administer 1 drop into affected eye(s) twice a day.       ezetimibe (Zetia) 10 mg tablet TAKE 1 TABLET AT BEDTIME 90 tablet 0    Gemtesa 75 mg tablet Take 1 tablet (75 mg) by mouth once daily.      hydrocortisone 2.5 % lotion Apply 1 Application topically twice a day.      irbesartan-hydrochlorothiazide (Avalide) 150-12.5 mg tablet Take 1 tablet by mouth once daily.      latanoprost (Xalatan) 0.005 % ophthalmic solution Administer 1 drop into both eyes once daily at bedtime.      multivit-min/ferrous fumarate (MULTI VITAMIN ORAL) Take 1 tablet by mouth once daily.      nitroglycerin (Nitrostat) 0.4 mg SL tablet Place 1 tablet (0.4 mg) under the tongue every 5 minutes if needed for chest pain. TAKE PER DIRECTED      NON FORMULARY POTASSIUM 99 MG ORAL TABLET,, TAKE 1 TAB DAILY PER DIRECTED       No current facility-administered medications for this visit.       Review of Systems    REVIEW OF SYSTEMS  GENERAL:  Negative for malaise, significant weight loss, fever  CARDIOVASCULAR: leg swelling   MUSCULOSKELETAL:  Negative for joint pain or swelling, back pain, and muscle pain.  SKIN:  Negative for lesions, rash, and itching  PSYCH:  Negative for sleep disturbance, mood disorder and recent psychosocial stressors  NEURO: Negative, denies any burning, tingling or numbness     Objective:   Vasc: DP and PT pulses are palpable bilateral.  CFT is less than 3 seconds bilateral.  Skin temperature is warm to cool proximal to distal bilateral.  Some stasis changes noted    Neuro:  Light touch is intact to the foot bilateral.      Derm: Nails 1-5 bilateral are thickened, elongated and crumbly with subungual debris. Skin is supple with normal texture and turgor noted.  Webspaces are clean, dry and intact bilateral.  There are no hyperkeratoses, ulcerations, verruca or other lesions noted.      Ortho: Muscle strength is 5/5 for all pedal groups tested.  Ankle joint, subtalar joint, 1st MPJ and lesser MPJ ROM is full and without pain or crepitus.  The foot type is rectus bilateral off  weight bearing.  There are no structural deformities noted.    Assessment/Plan   Painful nail mycosis    Toenails are debrided in length and thickness to avoid infection and for pain relief  Pamela Barraza-Magen, MALKA  [address] [place] [address]             Kierra Calvo CMA

## 2024-03-04 DIAGNOSIS — I10 PRIMARY HYPERTENSION: ICD-10-CM

## 2024-03-04 RX ORDER — IRBESARTAN AND HYDROCHLOROTHIAZIDE 150; 12.5 MG/1; MG/1
1 TABLET, FILM COATED ORAL DAILY
Qty: 90 TABLET | Refills: 0 | Status: SHIPPED | OUTPATIENT
Start: 2024-03-04 | End: 2024-06-02

## 2024-03-04 NOTE — TELEPHONE ENCOUNTER
Rx Refill Request Telephone Encounter    Name:  Betty Stein  :  [id number]  Medication Name:    irbesartan-hydrochlorothiazide (Avalide) 150-12.5 mg tablet      Specific Pharmacy location:    [facility] [address] [place] [id number] [facility] Phone: [phone]   Fax: [phone]        Date of last appointment:  10/27/2023    Date of next appointment:  2024

## 2024-04-11 ENCOUNTER — LAB (OUTPATIENT)
Dept: LAB | Facility: LAB | Age: 84
End: 2024-04-11
Payer: MEDICARE

## 2024-04-11 ENCOUNTER — OFFICE VISIT (OUTPATIENT)
Dept: PRIMARY CARE | Facility: CLINIC | Age: 84
End: 2024-04-11
Payer: MEDICARE

## 2024-04-11 VITALS
SYSTOLIC BLOOD PRESSURE: 114 MMHG | WEIGHT: 126.4 LBS | HEIGHT: 69 IN | HEART RATE: 72 BPM | BODY MASS INDEX: 18.72 KG/M2 | DIASTOLIC BLOOD PRESSURE: 66 MMHG | TEMPERATURE: 97.3 F

## 2024-04-11 DIAGNOSIS — E03.9 ACQUIRED HYPOTHYROIDISM: ICD-10-CM

## 2024-04-11 DIAGNOSIS — R79.89 ABNORMAL TSH: ICD-10-CM

## 2024-04-11 DIAGNOSIS — F41.1 GENERALIZED ANXIETY DISORDER: ICD-10-CM

## 2024-04-11 DIAGNOSIS — Z78.9 NEVER SMOKED CIGARETTES: ICD-10-CM

## 2024-04-11 DIAGNOSIS — I10 PRIMARY HYPERTENSION: ICD-10-CM

## 2024-04-11 DIAGNOSIS — I25.10 CORONARY ARTERY DISEASE INVOLVING NATIVE CORONARY ARTERY OF NATIVE HEART WITHOUT ANGINA PECTORIS: ICD-10-CM

## 2024-04-11 DIAGNOSIS — E78.49 OTHER HYPERLIPIDEMIA: ICD-10-CM

## 2024-04-11 LAB
ALBUMIN SERPL BCP-MCNC: 4.3 G/DL (ref 3.4–5)
ALP SERPL-CCNC: 62 U/L (ref 33–136)
ALT SERPL W P-5'-P-CCNC: 17 U/L (ref 7–45)
ANION GAP SERPL CALC-SCNC: 12 MMOL/L (ref 10–20)
AST SERPL W P-5'-P-CCNC: 22 U/L (ref 9–39)
BILIRUB SERPL-MCNC: 0.7 MG/DL (ref 0–1.2)
BUN SERPL-MCNC: 32 MG/DL (ref 6–23)
CALCIUM SERPL-MCNC: 9.7 MG/DL (ref 8.6–10.3)
CHLORIDE SERPL-SCNC: 102 MMOL/L (ref 98–107)
CO2 SERPL-SCNC: 29 MMOL/L (ref 21–32)
CREAT SERPL-MCNC: 1.12 MG/DL (ref 0.5–1.05)
EGFRCR SERPLBLD CKD-EPI 2021: 48 ML/MIN/1.73M*2
GLUCOSE SERPL-MCNC: 91 MG/DL (ref 74–99)
POTASSIUM SERPL-SCNC: 4.4 MMOL/L (ref 3.5–5.3)
PROT SERPL-MCNC: 6.8 G/DL (ref 6.4–8.2)
SODIUM SERPL-SCNC: 139 MMOL/L (ref 136–145)
TSH SERPL-ACNC: 2.66 MIU/L (ref 0.44–3.98)

## 2024-04-11 PROCEDURE — 99213 OFFICE O/P EST LOW 20 MIN: CPT | Performed by: FAMILY MEDICINE

## 2024-04-11 PROCEDURE — 3074F SYST BP LT 130 MM HG: CPT | Performed by: FAMILY MEDICINE

## 2024-04-11 PROCEDURE — 1036F TOBACCO NON-USER: CPT | Performed by: FAMILY MEDICINE

## 2024-04-11 PROCEDURE — 1159F MED LIST DOCD IN RCRD: CPT | Performed by: FAMILY MEDICINE

## 2024-04-11 PROCEDURE — 3078F DIAST BP <80 MM HG: CPT | Performed by: FAMILY MEDICINE

## 2024-04-11 PROCEDURE — 80053 COMPREHEN METABOLIC PANEL: CPT

## 2024-04-11 PROCEDURE — 36415 COLL VENOUS BLD VENIPUNCTURE: CPT

## 2024-04-11 PROCEDURE — 84443 ASSAY THYROID STIM HORMONE: CPT

## 2024-04-11 PROCEDURE — 1160F RVW MEDS BY RX/DR IN RCRD: CPT | Performed by: FAMILY MEDICINE

## 2024-04-11 RX ORDER — ASPIRIN 81 MG/1
81 TABLET ORAL DAILY
Qty: 90 TABLET | Refills: 3 | Status: SHIPPED | OUTPATIENT
Start: 2024-04-11

## 2024-04-11 RX ORDER — ALPRAZOLAM 0.25 MG/1
0.25 TABLET ORAL 2 TIMES DAILY PRN
Qty: 20 TABLET | Refills: 0 | Status: SHIPPED | OUTPATIENT
Start: 2024-04-11

## 2024-04-11 RX ORDER — MAGNESIUM HYDROXIDE 400 MG/5ML
1 SUSPENSION, ORAL (FINAL DOSE FORM) ORAL DAILY
COMMUNITY

## 2024-04-11 ASSESSMENT — PATIENT HEALTH QUESTIONNAIRE - PHQ9
2. FEELING DOWN, DEPRESSED OR HOPELESS: NOT AT ALL
SUM OF ALL RESPONSES TO PHQ9 QUESTIONS 1 AND 2: 0
1. LITTLE INTEREST OR PLEASURE IN DOING THINGS: NOT AT ALL

## 2024-04-11 NOTE — PROGRESS NOTES
Subjective Betty Dwyer is here accompanied by her  for follow-up on hypertension and hyperlipidemia as well as her chronic anxiety disorder.  She states that overall she has been feeling well she fell tripping over a slipper about 6 weeks ago injuring her right ribs.  There was discomfort for several weeks which has now resolved.  She had no cough or shortness of breath.  This was never evaluated.  She continues on her meds noted.  She sees Dr. Gerald Hughes for her cardiology care.  She uses Xanax on an intermittent basis.    Patient ID: Betty Stein is a 84 y.o. female who presents for 6 month follow-up:    Problem List Items Addressed This Visit    None     Past Medical History:   Diagnosis Date    Encounter for examination for normal comparison and control in clinical research program 01/21/2019    Research exam    Inflamed seborrheic keratosis 05/14/2020    Seborrheic keratoses, inflamed    Ingrowing nail 04/15/2021    Ingrown toenail    Nonrheumatic aortic (valve) stenosis 05/10/2019    Aortic stenosis, severe    Personal history of other diseases of the musculoskeletal system and connective tissue 11/11/2021    History of bone disorder      Past Surgical History:   Procedure Laterality Date    OTHER SURGICAL HISTORY  06/29/2018    Hip Replacement Bilateral    OTHER SURGICAL HISTORY  11/11/2021    Surgery    OTHER SURGICAL HISTORY  11/11/2021    Cataract surgery    OTHER SURGICAL HISTORY  11/11/2021    Appendectomy    OTHER SURGICAL HISTORY  11/11/2021    Cardiac catheterization    OTHER SURGICAL HISTORY  11/11/2021    Transcatheter aortic valve replacement    OTHER SURGICAL HISTORY  11/11/2021    Percutaneous transluminal coronary angioplasty    OTHER SURGICAL HISTORY  11/11/2021    Phacoemulsification of cataract and insertion of intraocular lens    OTHER SURGICAL HISTORY  11/18/2021    Colonoscopy      Family History   Problem Relation Name Age of Onset    Coronary artery disease Mother      No Known  Problems Father        Social History     Socioeconomic History    Marital status:      Spouse name: Not on file    Number of children: Not on file    Years of education: Not on file    Highest education level: Not on file   Occupational History    Not on file   Tobacco Use    Smoking status: Never     Passive exposure: Never    Smokeless tobacco: Never   Vaping Use    Vaping status: Never Used   Substance and Sexual Activity    Alcohol use: Not Currently     Alcohol/week: 1.0 standard drink of alcohol     Types: 1 Standard drinks or equivalent per week     Comment: Gave up the 1 drink    Drug use: Never    Sexual activity: Defer   Other Topics Concern    Not on file   Social History Narrative    Not on file     Social Determinants of Health     Financial Resource Strain: Not on file   Food Insecurity: Not on file   Transportation Needs: Not on file   Physical Activity: Not on file   Stress: Not on file   Social Connections: Not on file   Intimate Partner Violence: Not on file   Housing Stability: Not on file      Patient has no known allergies.   Current Outpatient Medications   Medication Sig Dispense Refill    ALPRAZolam (Xanax) 0.25 mg tablet Take 1 tablet (0.25 mg) by mouth 2 times a day as needed for anxiety. 20 tablet 0    amLODIPine (Norvasc) 5 mg tablet Take 1 tablet (5 mg) by mouth once daily. 90 tablet 3    aspirin 81 mg EC tablet Take 1 tablet (81 mg) by mouth once daily.      atorvastatin (Lipitor) 20 mg tablet TAKE 1 TABLET AT BEDTIME 90 tablet 1    cholecalciferol (Vitamin D-3) 25 MCG (1000 UT) capsule Take 1 capsule (25 mcg) by mouth once daily.      dorzolamide-timoloL (Cosopt) 22.3-6.8 mg/mL ophthalmic solution Administer 1 drop into affected eye(s) twice a day.      ezetimibe (Zetia) 10 mg tablet TAKE 1 TABLET AT BEDTIME 90 tablet 1    Gemtesa 75 mg tablet Take 1 tablet (75 mg) by mouth once daily.      hydrocortisone 2.5 % lotion Apply 1 Application topically twice a day.       irbesartan-hydrochlorothiazide (Avalide) 150-12.5 mg tablet Take 1 tablet by mouth once daily. 90 tablet 0    latanoprost (Xalatan) 0.005 % ophthalmic solution Administer 1 drop into both eyes once daily at bedtime.      multivit-min/ferrous fumarate (MULTI VITAMIN ORAL) Take 1 tablet by mouth once daily.      nitroglycerin (Nitrostat) 0.4 mg SL tablet Place 1 tablet (0.4 mg) under the tongue every 5 minutes if needed for chest pain. TAKE PER DIRECTED      NON FORMULARY Take by mouth early in the morning.. POTASSIUM 99 MG ORAL TABLET,TAKE 1 TAB DAILY PER DIRECTED       No current facility-administered medications for this visit.       Immunization History   Administered Date(s) Administered    Flu vaccine, quadrivalent, high-dose, preservative free, age 65y+ (FLUZONE) 09/22/2023    Influenza, High Dose Seasonal, Preservative Free 08/27/2020    Influenza, Unspecified 10/15/2009, 09/23/2011, 10/01/2012, 10/01/2013, 09/01/2014, 09/01/2015, 10/01/2016, 10/01/2017, 09/01/2019, 10/14/2021    Novel influenza-H1N1-09, preservative-free 01/11/2010    Pfizer Gray Cap SARS-CoV-2 10/15/2023    Pfizer Purple Cap SARS-CoV-2 01/26/2021, 02/15/2021, 09/27/2021, 09/16/2022    Pneumococcal conjugate vaccine, 13-valent (PREVNAR 13) 11/24/2015    Pneumococcal polysaccharide vaccine, 23-valent, age 2 years and older (PNEUMOVAX 23) 10/01/2012, 10/17/2014    SARS-CoV-2, Unspecified 04/30/2022    Td (adult), unspecified 06/03/2000    Zoster vaccine, recombinant, adult (SHINGRIX) 02/26/2020, 07/23/2020    Zoster, Unspecified 07/28/2000    Zoster, live 11/27/2007        Review of Systems   Constitutional: Negative.    HENT: Negative.     Eyes: Negative.    Respiratory: Negative.     Cardiovascular: Negative.    Gastrointestinal: Negative.    Endocrine: Negative.    Genitourinary: Negative.    Musculoskeletal: Negative.    Allergic/Immunologic: Negative.    Neurological: Negative.    Hematological: Negative.    Psychiatric/Behavioral:  The  patient is nervous/anxious.    All other systems reviewed and are negative.       Vitals:    04/11/24 1124   BP: 114/66   Pulse: 72   Temp: 36.3 °C (97.3 °F)     Vitals:    04/11/24 1124   Weight: 57.3 kg (126 lb 6.4 oz)      Physical Exam  Constitutional:       General: She is not in acute distress.     Appearance: Normal appearance.   Neck:      Vascular: No carotid bruit.   Cardiovascular:      Pulses: Normal pulses.      Heart sounds: Normal heart sounds. No murmur heard.     No friction rub. No gallop.   Pulmonary:      Effort: Pulmonary effort is normal. No respiratory distress.      Breath sounds: Normal breath sounds. No wheezing or rales.   Musculoskeletal:      Cervical back: Neck supple.   Neurological:      General: No focal deficit present.      Mental Status: She is alert and oriented to person, place, and time. Mental status is at baseline.      Gait: Gait abnormal.     The patient's gait is somewhat slow and slightly unsteady.  She has difficulty arising up onto the exam table.    ASSESSMENT/PLAN: Falling episode with unsteady gait.  Recommended physical therapy evaluation and treat which the patient defers.  We discussed the importance of fall prevention.  Recommended that she at least use a cane or preferably a walker for her gait.    Hypertension stable.  Continue current meds    Hyperlipidemia stable.    Elevated TSH.  Repeat TSH and CMP now.  Consider thyroid supplement if TSH remains elevated.    Chronic anxiety disorder.  Refill given for alprazolam.  Patient understands potential side effects and risk for habituation.  She understands to use this only on an as-needed basis.    Continue current meds  Follow-up with cardiology as recommended  Follow-up in 6 months and call as needed     Scribe Attestation  By signing my name below, I, Meera Gautam LPN, Scribe   attest that this documentation has been prepared under the direction and in the presence of Ronald Lowry MD.

## 2024-04-12 ENCOUNTER — TELEPHONE (OUTPATIENT)
Dept: PRIMARY CARE | Facility: CLINIC | Age: 84
End: 2024-04-12
Payer: MEDICARE

## 2024-04-12 ASSESSMENT — ENCOUNTER SYMPTOMS
GASTROINTESTINAL NEGATIVE: 1
HEMATOLOGIC/LYMPHATIC NEGATIVE: 1
CONSTITUTIONAL NEGATIVE: 1
NEUROLOGICAL NEGATIVE: 1
CARDIOVASCULAR NEGATIVE: 1
NERVOUS/ANXIOUS: 1
EYES NEGATIVE: 1
MUSCULOSKELETAL NEGATIVE: 1
RESPIRATORY NEGATIVE: 1
ENDOCRINE NEGATIVE: 1
ALLERGIC/IMMUNOLOGIC NEGATIVE: 1

## 2024-04-12 NOTE — TELEPHONE ENCOUNTER
----- Message from Ronald Lowry MD sent at 4/11/2024  5:50 PM EDT -----  Potassium level good and thyroid level better

## 2024-04-25 ENCOUNTER — PROCEDURE VISIT (OUTPATIENT)
Dept: PODIATRY | Facility: CLINIC | Age: 84
End: 2024-04-25
Payer: MEDICARE

## 2024-04-25 DIAGNOSIS — G89.29 CHRONIC PAIN OF TOE OF RIGHT FOOT: ICD-10-CM

## 2024-04-25 DIAGNOSIS — M79.675 CHRONIC PAIN OF TOE OF LEFT FOOT: Primary | ICD-10-CM

## 2024-04-25 DIAGNOSIS — B35.1 ONYCHOMYCOSIS: ICD-10-CM

## 2024-04-25 DIAGNOSIS — R21 RASH: ICD-10-CM

## 2024-04-25 DIAGNOSIS — G89.29 CHRONIC PAIN OF TOE OF LEFT FOOT: Primary | ICD-10-CM

## 2024-04-25 DIAGNOSIS — M79.674 CHRONIC PAIN OF TOE OF RIGHT FOOT: ICD-10-CM

## 2024-04-25 PROCEDURE — 99213 OFFICE O/P EST LOW 20 MIN: CPT | Performed by: PODIATRIST

## 2024-04-25 NOTE — PROGRESS NOTES
History Of Present Illness  Betty Stein is a 84 y.o. female presenting with painful elongated nails.     Past Medical History  She has a past medical history of Encounter for examination for normal comparison and control in clinical research program (01/21/2019), Inflamed seborrheic keratosis (05/14/2020), Ingrowing nail (04/15/2021), Nonrheumatic aortic (valve) stenosis (05/10/2019), and Personal history of other diseases of the musculoskeletal system and connective tissue (11/11/2021).    Surgical History  She has a past surgical history that includes Other surgical history (06/29/2018); Other surgical history (11/11/2021); Other surgical history (11/11/2021); Other surgical history (11/11/2021); Other surgical history (11/11/2021); Other surgical history (11/11/2021); Other surgical history (11/11/2021); Other surgical history (11/11/2021); and Other surgical history (11/18/2021).     Social History  She reports that she has never smoked. She has never been exposed to tobacco smoke. She has never used smokeless tobacco. She reports that she does not currently use alcohol after a past usage of about 1.0 standard drink of alcohol per week. She reports that she does not use drugs.    Family History  Family History   Problem Relation Name Age of Onset    Coronary artery disease Mother      No Known Problems Father          Allergies  Patient has no known allergies.    Medications  Current Outpatient Medications   Medication Sig Dispense Refill    ALPRAZolam (Xanax) 0.25 mg tablet Take 1 tablet (0.25 mg) by mouth 2 times a day as needed for anxiety. 20 tablet 0    amLODIPine (Norvasc) 5 mg tablet Take 1 tablet (5 mg) by mouth once daily. 90 tablet 3    aspirin 81 mg EC tablet Take 1 tablet (81 mg) by mouth once daily. 90 tablet 3    atorvastatin (Lipitor) 20 mg tablet TAKE 1 TABLET AT BEDTIME 90 tablet 1    cholecalciferol (Vitamin D-3) 25 MCG (1000 UT) capsule Take 1 capsule (25 mcg) by mouth once daily.       dorzolamide-timoloL (Cosopt) 22.3-6.8 mg/mL ophthalmic solution Administer 1 drop into affected eye(s) twice a day.      ezetimibe (Zetia) 10 mg tablet TAKE 1 TABLET AT BEDTIME 90 tablet 1    Gemtesa 75 mg tablet Take 1 tablet (75 mg) by mouth once daily.      hydrocortisone 2.5 % lotion Apply 1 Application topically twice a day.      irbesartan-hydrochlorothiazide (Avalide) 150-12.5 mg tablet Take 1 tablet by mouth once daily. 90 tablet 0    latanoprost (Xalatan) 0.005 % ophthalmic solution Administer 1 drop into both eyes once daily at bedtime.      multivit-min/ferrous fumarate (MULTI VITAMIN ORAL) Take 1 tablet by mouth once daily.      nitroglycerin (Nitrostat) 0.4 mg SL tablet Place 1 tablet (0.4 mg) under the tongue every 5 minutes if needed for chest pain. TAKE PER DIRECTED      potassium gluconate 595 mg (99 mg) tablet Take 1 tablet by mouth once daily.       No current facility-administered medications for this visit.       Review of Systems    REVIEW OF SYSTEMS  GENERAL:  Negative for malaise, significant weight loss, fever  CARDIOVASCULAR: leg swelling   MUSCULOSKELETAL:  Negative for joint pain or swelling, back pain, and muscle pain.  SKIN:  Negative for lesions, rash, and itching  PSYCH:  Negative for sleep disturbance, mood disorder and recent psychosocial stressors  NEURO: Negative, denies any burning, tingling or numbness     Objective:   Vasc: DP and PT pulses are palpable bilateral.  CFT is less than 3 seconds bilateral.  Skin temperature is warm to cool proximal to distal bilateral.  Some stasis changes noted    Neuro:  Light touch is intact to the foot bilateral.      Derm: Nails 1-5 bilateral are thickened, elongated and crumbly with subungual debris. Skin is supple with normal texture and turgor noted.  Webspaces are clean, dry and intact bilateral.  There are no hyperkeratoses, ulcerations, verruca or other lesions noted.  She has a dry patch left ankle.  It is rough and raised.  It is  slightly discolored.    Ortho: Muscle strength is 5/5 for all pedal groups tested.  Ankle joint, subtalar joint, 1st MPJ and lesser MPJ ROM is full and without pain or crepitus.  The foot type is rectus bilateral off weight bearing.  There are no structural deformities noted.    Assessment/Plan   Painful nail mycosis  Lower ankle left rash-reviewed medications.  Patient can use hydrocortisone twice a day on her ankle.  Suspect it is due to venous insufficiency.    Toenails are debrided in length and thickness to avoid infection and for pain relief  Pamela Barraza-Magen, DPSARIAH  [address] [place] [address]             Kierra Calvo, KRISTEN

## 2024-04-26 ENCOUNTER — OFFICE VISIT (OUTPATIENT)
Dept: CARDIOLOGY | Facility: CLINIC | Age: 84
End: 2024-04-26
Payer: MEDICARE

## 2024-04-26 VITALS
WEIGHT: 127 LBS | DIASTOLIC BLOOD PRESSURE: 76 MMHG | HEIGHT: 69 IN | BODY MASS INDEX: 18.81 KG/M2 | HEART RATE: 65 BPM | SYSTOLIC BLOOD PRESSURE: 130 MMHG | TEMPERATURE: 97.3 F

## 2024-04-26 DIAGNOSIS — I10 PRIMARY HYPERTENSION: ICD-10-CM

## 2024-04-26 DIAGNOSIS — E78.2 MIXED HYPERLIPIDEMIA: ICD-10-CM

## 2024-04-26 DIAGNOSIS — I25.10 CORONARY ARTERY DISEASE INVOLVING NATIVE CORONARY ARTERY OF NATIVE HEART WITHOUT ANGINA PECTORIS: ICD-10-CM

## 2024-04-26 DIAGNOSIS — R26.9 GAIT DISTURBANCE: ICD-10-CM

## 2024-04-26 DIAGNOSIS — Z95.2 S/P TAVR (TRANSCATHETER AORTIC VALVE REPLACEMENT): ICD-10-CM
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
## 2024-04-26 DIAGNOSIS — R07.89 ATYPICAL CHEST PAIN: ICD-10-CM
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
## 2024-04-26 DIAGNOSIS — Z78.9 NEVER SMOKED CIGARETTES: ICD-10-CM

## 2024-04-26 PROCEDURE — 1159F MED LIST DOCD IN RCRD: CPT | Performed by: INTERNAL MEDICINE

## 2024-04-26 PROCEDURE — 93000 ELECTROCARDIOGRAM COMPLETE: CPT | Performed by: INTERNAL MEDICINE

## 2024-04-26 PROCEDURE — 3075F SYST BP GE 130 - 139MM HG: CPT | Performed by: INTERNAL MEDICINE

## 2024-04-26 PROCEDURE — 99214 OFFICE O/P EST MOD 30 MIN: CPT | Performed by: INTERNAL MEDICINE

## 2024-04-26 PROCEDURE — 1160F RVW MEDS BY RX/DR IN RCRD: CPT | Performed by: INTERNAL MEDICINE

## 2024-04-26 PROCEDURE — 3078F DIAST BP <80 MM HG: CPT | Performed by: INTERNAL MEDICINE

## 2024-04-26 PROCEDURE — 1036F TOBACCO NON-USER: CPT | Performed by: INTERNAL MEDICINE

## 2024-04-26 RX ORDER — NITROGLYCERIN 0.4 MG/1
0.4 TABLET SUBLINGUAL EVERY 5 MIN PRN
Qty: 25 TABLET | Refills: 3 | Status: SHIPPED | OUTPATIENT
Start: 2024-04-26

## 2024-04-26 ASSESSMENT — ENCOUNTER SYMPTOMS
EYES NEGATIVE: 1
ARTHRALGIAS: 1
PALPITATIONS: 0
APPETITE CHANGE: 1
NERVOUS/ANXIOUS: 1
GASTROINTESTINAL NEGATIVE: 1
WEAKNESS: 1
STRIDOR: 0
COUGH: 0
ACTIVITY CHANGE: 1
SHORTNESS OF BREATH: 1
WHEEZING: 0
CHOKING: 0
FATIGUE: 1
BRUISES/BLEEDS EASILY: 1

## 2024-04-26 ASSESSMENT — PATIENT HEALTH QUESTIONNAIRE - PHQ9
SUM OF ALL RESPONSES TO PHQ9 QUESTIONS 1 AND 2: 0
2. FEELING DOWN, DEPRESSED OR HOPELESS: NOT AT ALL
1. LITTLE INTEREST OR PLEASURE IN DOING THINGS: NOT AT ALL

## 2024-04-26 NOTE — PROGRESS NOTES
Patient:  Betty Stein  YOB: 1940  MRN: [id number]       Chief Complaint/Active Symptoms:       Betty Stein is a 84 y.o. female who returns today for cardiac follow-up.    Here for 6 month follow-up. Has moved to independent living. Had a fall last week. Stood up from her chair holding her [place] while trying to put her foot in her slipper and fell to the side. Had problems with pain in her back and side for a week due to pain but never got medical attention. Feeling better now but is just moving slower.     She has had some right-sided upper chest discomfort that she describes as sharp in nature.  It occurs at rest and last for a few minutes.  It is not related to meals or position.  She is not thought to use her nitroglycerin therapy to see if that improves the symptoms but it is not frequent in nature.  She has some fatigue and some difficulty with ambulation and balance.  She has no anginal type chest discomfort.  She has some mild chronic dyspnea on exertion without orthopnea or PND.  No lower extremity edema.  No awareness of any rapid or irregular heartbeats.    She is amazed that her  is doing so well at his age and she was few years younger is struggling with some of her mobility issues and ambulation.  For this reason they are not in an independent living facility so she has other people around and help when he is off traveling with her children.    Review of Systems   Constitutional:  Positive for activity change, appetite change and fatigue.   HENT:  Positive for congestion and hearing loss.    Eyes: Negative.    Respiratory:  Positive for shortness of breath. Negative for cough, choking, wheezing and stridor.    Cardiovascular:  Positive for chest pain. Negative for palpitations and leg swelling.   Gastrointestinal: Negative.    Genitourinary: Negative.    Musculoskeletal:  Positive for arthralgias and gait problem.   Neurological:  Positive for weakness.   Hematological:   Bruises/bleeds easily.   Psychiatric/Behavioral:  The patient is nervous/anxious.    All other systems reviewed and are negative.      Objective:     Vitals:    04/26/24 1121   BP: 130/76   Pulse: 65   Temp: 36.3 °C (97.3 °F)       Vitals:    04/26/24 1121   Weight: 57.6 kg (127 lb)       Allergies:     No Known Allergies     Medications:     Current Outpatient Medications   Medication Instructions    ALPRAZolam (XANAX) 0.25 mg, oral, 2 times daily PRN    amLODIPine (NORVASC) 5 mg, oral, Daily    aspirin 81 mg, oral, Daily    atorvastatin (LIPITOR) 20 mg, oral, Nightly    cholecalciferol (VITAMIN D-3) 25 mcg, oral, Daily    dorzolamide-timoloL (Cosopt) 22.3-6.8 mg/mL ophthalmic solution 1 drop, ophthalmic (eye), 2 times daily    ezetimibe (ZETIA) 10 mg, oral, Nightly    Gemtesa 75 mg tablet 1 tablet, oral, Daily    hydrocortisone 2.5 % lotion 1 Application, Topical, 2 times daily    irbesartan-hydrochlorothiazide (Avalide) 150-12.5 mg tablet 1 tablet, oral, Daily    latanoprost (Xalatan) 0.005 % ophthalmic solution 1 drop, Both Eyes, Nightly    multivit-min/ferrous fumarate (MULTI VITAMIN ORAL) 1 tablet, oral, Daily    nitroglycerin (NITROSTAT) 0.4 mg, sublingual, Every 5 min PRN, TAKE PER DIRECTED    potassium gluconate 595 mg (99 mg) tablet 1 tablet, oral, Daily       Physical Examination:   GENERAL:  Well developed, thin woman.  Gait and ambulation is somewhat halted and off balanced.    HEENT: NC AT, EOMI with anicteric sclera  NECK: Reduced cervical range of motion, no JVD, no bruit.  CHEST:  Symmetric and nontender.  LUNGS:  Clear to auscultation bilaterally, normal respiratory effort.  HEART: PMI is nondisplaced.  There is a regular rhythm occasionally irregular with a normal S1 and S2.  There is a soft systolic ejection murmur at the upper sternal border no diastolic murmur or S3.    ABDOMEN: Soft, NT, ND without palpable organomegaly or bruits  EXTREMITIES:  Warm with good color, no clubbing or cyanosis.   "There is no edema noted.  PERIPHERAL VASCULAR:  Pulses present and equally palpable;   MUSCULOSKELETAL: There is moderate sarcopenia.  Osteoarthritic changes present.  NEURO/PSYCH:  Alert and oriented times three with approppriate behavior and responses. Nonfocal motor examination with halting and wider spaced gait lymph: No significant palpable lymphadenopathy  Skin: no rash or lesions on exposed skin or reported.    Lab:     CBC:   Lab Results   Component Value Date    WBC 8.3 10/30/2023    RBC 4.38 10/30/2023    HGB 13.4 10/30/2023    HCT 41.6 10/30/2023     10/30/2023        CMP:    Lab Results   Component Value Date     04/11/2024    K 4.4 04/11/2024     04/11/2024    CO2 29 04/11/2024    BUN 32 (H) 04/11/2024    CREATININE 1.12 (H) 04/11/2024    GLUCOSE 91 04/11/2024    CALCIUM 9.7 04/11/2024       Magnesium:    No results found for: \"MG\"    Lipid Profile:    Lab Results   Component Value Date    TRIG 134 10/30/2023    HDL 49.0 10/30/2023    LDLCALC 88 10/30/2023       TSH:    Lab Results   Component Value Date    TSH 2.66 04/11/2024       BNP:   Lab Results   Component Value Date    BNP 97 08/20/2020        PT/INR:    Lab Results   Component Value Date    PROTIME 11.5 01/10/2022    INR 1.0 01/10/2022       HgBA1c:    No results found for: \"HGBA1C\"    BMP:  Lab Results   Component Value Date     04/11/2024     10/30/2023     09/09/2022     01/10/2022     10/15/2021    K 4.4 04/11/2024    K 3.6 10/30/2023    K 4.0 09/09/2022    K 4.0 01/10/2022    K 4.3 10/15/2021     04/11/2024    CL 99 10/30/2023     09/09/2022     01/10/2022     10/15/2021    CO2 29 04/11/2024    CO2 28 10/30/2023    CO2 30 09/09/2022    CO2 25 01/10/2022    CO2 30 10/15/2021    BUN 32 (H) 04/11/2024    BUN 21 10/30/2023    BUN 31 (H) 09/09/2022    BUN 39 (H) 01/10/2022    BUN 34 (H) 10/15/2021    CREATININE 1.12 (H) 04/11/2024    CREATININE 1.08 (H) 10/30/2023    " "CREATININE 1.06 (H) 09/09/2022    CREATININE 1.11 (H) 01/10/2022    CREATININE 1.17 (H) 10/15/2021       Cardiac Enzymes:    No results found for: \"TROPHS\"    Hepatic Function Panel:    Lab Results   Component Value Date    ALKPHOS 62 04/11/2024    ALT 17 04/11/2024    AST 22 04/11/2024    PROT 6.8 04/11/2024    BILITOT 0.7 04/11/2024         Diagnostic Studies:     No echocardiogram results found for the past 12 months  Last echocardiogram was 3/20/2021 showing normal LV function and ejection fraction.  TAVR prosthesis was not well-seen no perivalvular leak was seen and there was no significant aortic stenotic gradient.  There is mild to moderate tricuspid valve regurgitation at that time and moderate mitral valve regurgitation that was posteriorly directed.    EKG:   No results found for: \"EKG\"  EKG shows sinus bradycardia with premature supraventricular complexes, possible left atrial enlargement criteria for septal myocardial infarction  Radiology:     No orders to display     ASSESSMENT     Problem List Items Addressed This Visit       CAD (coronary artery disease)    Relevant Medications    nitroglycerin (Nitrostat) 0.4 mg SL tablet    HLD (hyperlipidemia)    HTN (hypertension)    Relevant Orders    ECG 12 lead (Clinic Performed) (Completed)    S/P TAVR (transcatheter aortic valve replacement)    Never smoked cigarettes    Fall at home - Primary    Relevant Orders    Referral to Physical Therapy    Gait disturbance    Relevant Orders    Referral to Physical Therapy    Atypical chest pain       PLAN     1.  Fall at home.  Gait disturbance.  Like the difficulties that the patient is having with her gait and balance here in the office and a recent fall at home.  We referred her to physical therapy for evaluation and treatment.  2.  Coronary artery disease with history of intervention.  Stable without angina pectoris or current chest discomfort is described as atypical and not suggestive of angina.  She develops " different type of chest discomfort pattern we have asked her to return for an earlier appointment.  3.  History of aortic stenosis status post TAVR.  Last echocardiogram showed it was working well we should repeat her echocardiogram later this year.  4.  Hypertension.  Blood pressures are controlled.  With her fall right now we want to avoid any hypotension.  5.  Hyperlipidemia.  Continue statin therapy and conservative medical management.  6.  Tobacco weight status.  The patient is a current non-smoker but is underweight.  We have encouraged her to increase her caloric and fluid intake.    Patient is otherwise stable from a cardiovascular standpoint.  Will see her back in 6 months to perform an echocardiogram at that time.  If she starts having more chest discomfort or something more consistent with her prior angina we have asked her to return for an earlier appointment.

## 2024-06-08 DIAGNOSIS — I10 PRIMARY HYPERTENSION: ICD-10-CM

## 2024-06-13 RX ORDER — IRBESARTAN AND HYDROCHLOROTHIAZIDE 150; 12.5 MG/1; MG/1
1 TABLET, FILM COATED ORAL DAILY
Qty: 90 TABLET | Refills: 1 | Status: SHIPPED | OUTPATIENT
Start: 2024-06-13 | End: 2024-12-10

## 2024-07-11 ENCOUNTER — APPOINTMENT (OUTPATIENT)
Dept: PODIATRY | Facility: CLINIC | Age: 84
End: 2024-07-11
Payer: MEDICARE

## 2024-07-16 DIAGNOSIS — E78.2 MIXED HYPERLIPIDEMIA: ICD-10-CM

## 2024-07-16 RX ORDER — ATORVASTATIN CALCIUM 20 MG/1
20 TABLET, FILM COATED ORAL NIGHTLY
Qty: 90 TABLET | Refills: 1 | Status: SHIPPED | OUTPATIENT
Start: 2024-07-16

## 2024-07-23 ENCOUNTER — APPOINTMENT (OUTPATIENT)
Dept: PODIATRY | Facility: CLINIC | Age: 84
End: 2024-07-23
Payer: MEDICARE

## 2024-07-23 DIAGNOSIS — B35.1 ONYCHOMYCOSIS: Primary | ICD-10-CM

## 2024-07-23 DIAGNOSIS — M79.675 PAIN IN TOES OF BOTH FEET: ICD-10-CM

## 2024-07-23 DIAGNOSIS — M79.674 PAIN IN TOES OF BOTH FEET: ICD-10-CM

## 2024-07-23 PROCEDURE — 11721 DEBRIDE NAIL 6 OR MORE: CPT | Performed by: PODIATRIST

## 2024-07-23 NOTE — PROGRESS NOTES
History Of Present Illness  Betty Stein is a 84 y.o. female presenting with painful elongated nails.  PCP Dr Lowry  Last visit 4/11/24     Past Medical History  She has a past medical history of Encounter for examination for normal comparison and control in clinical research program (01/21/2019), Inflamed seborrheic keratosis (05/14/2020), Ingrowing nail (04/15/2021), Nonrheumatic aortic (valve) stenosis (05/10/2019), and Personal history of other diseases of the musculoskeletal system and connective tissue (11/11/2021).    Surgical History  She has a past surgical history that includes Other surgical history (06/29/2018); Other surgical history (11/11/2021); Other surgical history (11/11/2021); Other surgical history (11/11/2021); Other surgical history (11/11/2021); Other surgical history (11/11/2021); Other surgical history (11/11/2021); Other surgical history (11/11/2021); and Other surgical history (11/18/2021).     Social History  She reports that she has never smoked. She has never been exposed to tobacco smoke. She has never used smokeless tobacco. She reports that she does not currently use alcohol after a past usage of about 1.0 standard drink of alcohol per week. She reports that she does not use drugs.    Family History  Family History   Problem Relation Name Age of Onset    Coronary artery disease Mother      No Known Problems Father          Allergies  Patient has no known allergies.    Medications  Current Outpatient Medications   Medication Sig Dispense Refill    ALPRAZolam (Xanax) 0.25 mg tablet Take 1 tablet (0.25 mg) by mouth 2 times a day as needed for anxiety. 20 tablet 0    amLODIPine (Norvasc) 5 mg tablet Take 1 tablet (5 mg) by mouth once daily. 90 tablet 3    aspirin 81 mg EC tablet Take 1 tablet (81 mg) by mouth once daily. 90 tablet 3    atorvastatin (Lipitor) 20 mg tablet TAKE 1 TABLET AT BEDTIME 90 tablet 1    cholecalciferol (Vitamin D-3) 25 MCG (1000 UT) capsule Take 1 capsule  (25 mcg) by mouth once daily.      dorzolamide-timoloL (Cosopt) 22.3-6.8 mg/mL ophthalmic solution Administer 1 drop into affected eye(s) twice a day.      ezetimibe (Zetia) 10 mg tablet TAKE 1 TABLET AT BEDTIME 90 tablet 1    Gemtesa 75 mg tablet Take 1 tablet (75 mg) by mouth once daily.      hydrocortisone 2.5 % lotion Apply 1 Application topically twice a day.      irbesartan-hydrochlorothiazide (Avalide) 150-12.5 mg tablet Take 1 tablet by mouth once daily. 90 tablet 1    latanoprost (Xalatan) 0.005 % ophthalmic solution Administer 1 drop into both eyes once daily at bedtime.      multivit-min/ferrous fumarate (MULTI VITAMIN ORAL) Take 1 tablet by mouth once daily.      nitroglycerin (Nitrostat) 0.4 mg SL tablet Place 1 tablet (0.4 mg) under the tongue every 5 minutes if needed for chest pain. TAKE PER DIRECTED 25 tablet 3    potassium gluconate 595 mg (99 mg) tablet Take 1 tablet by mouth once daily.       No current facility-administered medications for this visit.       Review of Systems    REVIEW OF SYSTEMS  GENERAL:  Negative for malaise, significant weight loss, fever  CARDIOVASCULAR: leg swelling   MUSCULOSKELETAL:  Negative for joint pain or swelling, back pain, and muscle pain.  SKIN:  Negative for lesions, rash, and itching  PSYCH:  Negative for sleep disturbance, mood disorder and recent psychosocial stressors  NEURO: Negative, denies any burning, tingling or numbness     Objective:   Vasc: DP and PT pulses are palpable bilateral.  CFT is less than 3 seconds bilateral.  Skin temperature is warm to cool proximal to distal bilateral.  Some stasis changes noted    Neuro:  Light touch is intact to the foot bilateral.      Derm: Nails 1-5 bilateral are thickened, elongated and crumbly with subungual debris. Skin is supple with normal texture and turgor noted.  Webspaces are clean, dry and intact bilateral.  There are no hyperkeratoses, ulcerations, verruca or other lesions noted.  She has ulcer left  ankle.  Leg bandaged   Ortho: Muscle strength is 5/5 for all pedal groups tested.  Ankle joint, subtalar joint, 1st MPJ and lesser MPJ ROM is full and without pain or crepitus.  The foot type is rectus bilateral off weight bearing.  There are no structural deformities noted.    Assessment/Plan   Painful nail mycosis      Toenails are debrided in length and thickness to avoid infection and for pain relief  Pamela Barraza-Magen, DPM  [address] [place] [address]

## 2024-07-25 ENCOUNTER — APPOINTMENT (OUTPATIENT)
Dept: PODIATRY | Facility: CLINIC | Age: 84
End: 2024-07-25
Payer: MEDICARE

## 2024-07-29 ENCOUNTER — TELEPHONE (OUTPATIENT)
Dept: PRIMARY CARE | Facility: CLINIC | Age: 84
End: 2024-07-29
Payer: MEDICARE

## 2024-07-29 NOTE — TELEPHONE ENCOUNTER
Pts  left vm stating pt is wanting to know if it is ok to take amlodipine in the am rather than in pm.

## 2024-08-08 ENCOUNTER — APPOINTMENT (OUTPATIENT)
Dept: CARDIOLOGY | Facility: CLINIC | Age: 84
End: 2024-08-08
Payer: MEDICARE

## 2024-08-22 ENCOUNTER — APPOINTMENT (OUTPATIENT)
Dept: CARDIOLOGY | Facility: CLINIC | Age: 84
End: 2024-08-22
Payer: MEDICARE

## 2024-08-22 VITALS
HEART RATE: 73 BPM | BODY MASS INDEX: 18.51 KG/M2 | WEIGHT: 125 LBS | HEIGHT: 69 IN | DIASTOLIC BLOOD PRESSURE: 64 MMHG | SYSTOLIC BLOOD PRESSURE: 122 MMHG

## 2024-08-22 DIAGNOSIS — Z98.61 HISTORY OF PTCA: ICD-10-CM

## 2024-08-22 DIAGNOSIS — Z78.9 NEVER SMOKED CIGARETTES: ICD-10-CM

## 2024-08-22 DIAGNOSIS — I10 PRIMARY HYPERTENSION: ICD-10-CM

## 2024-08-22 DIAGNOSIS — I25.10 CORONARY ARTERY DISEASE INVOLVING NATIVE CORONARY ARTERY OF NATIVE HEART WITHOUT ANGINA PECTORIS: ICD-10-CM

## 2024-08-22 DIAGNOSIS — E78.2 MIXED HYPERLIPIDEMIA: ICD-10-CM

## 2024-08-22 DIAGNOSIS — Z95.2 S/P TAVR (TRANSCATHETER AORTIC VALVE REPLACEMENT): Primary | ICD-10-CM

## 2024-08-22 PROCEDURE — 1160F RVW MEDS BY RX/DR IN RCRD: CPT | Performed by: INTERNAL MEDICINE

## 2024-08-22 PROCEDURE — 3074F SYST BP LT 130 MM HG: CPT | Performed by: INTERNAL MEDICINE

## 2024-08-22 PROCEDURE — 1036F TOBACCO NON-USER: CPT | Performed by: INTERNAL MEDICINE

## 2024-08-22 PROCEDURE — 99214 OFFICE O/P EST MOD 30 MIN: CPT | Performed by: INTERNAL MEDICINE

## 2024-08-22 PROCEDURE — 1159F MED LIST DOCD IN RCRD: CPT | Performed by: INTERNAL MEDICINE

## 2024-08-22 PROCEDURE — 3078F DIAST BP <80 MM HG: CPT | Performed by: INTERNAL MEDICINE

## 2024-08-22 RX ORDER — IRBESARTAN AND HYDROCHLOROTHIAZIDE 150; 12.5 MG/1; MG/1
1 TABLET, FILM COATED ORAL DAILY
Qty: 90 TABLET | Refills: 3 | Status: SHIPPED | OUTPATIENT
Start: 2024-08-22 | End: 2025-08-22

## 2024-08-22 ASSESSMENT — ENCOUNTER SYMPTOMS
ARTHRALGIAS: 1
BACK PAIN: 1
PALPITATIONS: 0
WOUND: 1
HEMATURIA: 0
DYSURIA: 0
FREQUENCY: 1
PSYCHIATRIC NEGATIVE: 1

## 2024-08-22 NOTE — PROGRESS NOTES
Patient:  Betty Stein  YOB: 1940  MRN: [id number]       Chief Complaint/Active Symptoms:       Betty Stein is a 84 y.o. female who returns today for cardiac follow-up.    Patient doing well. No chest pain or shortness of breath. No dizziness or lightheadedness. No palpitations. Has some mild left ankle edema and small skin ulcer.       Review of Systems   HENT: Negative.     Cardiovascular:  Positive for leg swelling. Negative for chest pain and palpitations.   Genitourinary:  Positive for frequency. Negative for dysuria and hematuria.   Musculoskeletal:  Positive for arthralgias and back pain.   Skin:  Positive for wound (left ankle).   Psychiatric/Behavioral: Negative.     All other systems reviewed and are negative.      Objective:     Vitals:    08/22/24 1114   BP: 122/64   Pulse: 73       Vitals:    08/22/24 1114   Weight: 56.7 kg (125 lb)       Allergies:     No Known Allergies       Medications:     Current Outpatient Medications   Medication Instructions    ALPRAZolam (XANAX) 0.25 mg, oral, 2 times daily PRN    amLODIPine (NORVASC) 5 mg, oral, Daily    aspirin 81 mg, oral, Daily    atorvastatin (LIPITOR) 20 mg, oral, Nightly    cholecalciferol (VITAMIN D-3) 25 mcg, oral, Daily    dorzolamide-timoloL (Cosopt) 22.3-6.8 mg/mL ophthalmic solution 1 drop, ophthalmic (eye), 2 times daily    ezetimibe (ZETIA) 10 mg, oral, Nightly    hydrocortisone 2.5 % lotion 1 Application, Topical, 2 times daily    irbesartan-hydrochlorothiazide (Avalide) 150-12.5 mg tablet 1 tablet, oral, Daily    latanoprost (Xalatan) 0.005 % ophthalmic solution 1 drop, Both Eyes, Nightly    multivit-min/ferrous fumarate (MULTI VITAMIN ORAL) 1 tablet, oral, Daily    nitroglycerin (NITROSTAT) 0.4 mg, sublingual, Every 5 min PRN, TAKE PER DIRECTED       Physical Examination:   GENERAL:  Well developed, well nourished, in no acute distress.  HEENT: NC AT, EOMI with anicteric sclera  NECK:  reduced ROM, no JVD, no  "bruit.  CHEST:  Symmetric and nontender.  LUNGS:  Clear to auscultation bilaterally, normal respiratory effort.  HEART:  PMI is nondisplaced. RRR with normal S1 and S2, no S3, soft BRIGIDA RUSB, no rub. No carotid or abdominal bruits  ABDOMEN: Soft, NT, ND without palpable organomegaly or bruits  EXTREMITIES:  Warm with good color, no clubbing or cyanosis.  There is no edema noted. Left lower leg bandaged  PERIPHERAL VASCULAR:  Pulses present and equally palpable; 1+ right pedals  MUSCULOSKELETAL: sarcopenia, OA changes  NEURO/PSYCH:  Alert and oriented times three with approppriate behavior and responses. Nonfocal motor examination slow ambulation with wide based gait  Lymph: No significant palpable lymphadenopathy  Skin: no rash or lesions on exposed skin or reported.    Lab:     CBC:   Lab Results   Component Value Date    WBC 8.3 10/30/2023    RBC 4.38 10/30/2023    HGB 13.4 10/30/2023    HCT 41.6 10/30/2023     10/30/2023        CMP:    Lab Results   Component Value Date     04/11/2024    K 4.4 04/11/2024     04/11/2024    CO2 29 04/11/2024    BUN 32 (H) 04/11/2024    CREATININE 1.12 (H) 04/11/2024    GLUCOSE 91 04/11/2024    CALCIUM 9.7 04/11/2024       Magnesium:    No results found for: \"MG\"    Lipid Profile:    Lab Results   Component Value Date    TRIG 134 10/30/2023    HDL 49.0 10/30/2023    LDLCALC 88 10/30/2023       TSH:    Lab Results   Component Value Date    TSH 2.66 04/11/2024       BNP:   Lab Results   Component Value Date    BNP 97 08/20/2020        PT/INR:    Lab Results   Component Value Date    PROTIME 11.5 01/10/2022    INR 1.0 01/10/2022       HgBA1c:    No results found for: \"HGBA1C\"    BMP:  Lab Results   Component Value Date     04/11/2024     10/30/2023     09/09/2022     01/10/2022     10/15/2021    K 4.4 04/11/2024    K 3.6 10/30/2023    K 4.0 09/09/2022    K 4.0 01/10/2022    K 4.3 10/15/2021     04/11/2024    CL 99 10/30/2023    CL " "102 09/09/2022     01/10/2022     10/15/2021    CO2 29 04/11/2024    CO2 28 10/30/2023    CO2 30 09/09/2022    CO2 25 01/10/2022    CO2 30 10/15/2021    BUN 32 (H) 04/11/2024    BUN 21 10/30/2023    BUN 31 (H) 09/09/2022    BUN 39 (H) 01/10/2022    BUN 34 (H) 10/15/2021    CREATININE 1.12 (H) 04/11/2024    CREATININE 1.08 (H) 10/30/2023    CREATININE 1.06 (H) 09/09/2022    CREATININE 1.11 (H) 01/10/2022    CREATININE 1.17 (H) 10/15/2021       Cardiac Enzymes:    No results found for: \"TROPHS\"    Hepatic Function Panel:    Lab Results   Component Value Date    ALKPHOS 62 04/11/2024    ALT 17 04/11/2024    AST 22 04/11/2024    PROT 6.8 04/11/2024    BILITOT 0.7 04/11/2024         Diagnostic Studies:     No recent cardiovascular testing    Radiology:     Transthoracic Echo (TTE) Complete    (Results Pending)     ASSESSMENT     Problem List Items Addressed This Visit       CAD (coronary artery disease)    Relevant Orders    Transthoracic Echo (TTE) Complete    History of PTCA    HLD (hyperlipidemia)    HTN (hypertension)    Relevant Medications    irbesartan-hydrochlorothiazide (Avalide) 150-12.5 mg tablet    S/P TAVR (transcatheter aortic valve replacement) - Primary    Relevant Orders    Transthoracic Echo (TTE) Complete    Never smoked cigarettes       PLAN   1.  Coronary artery disease with history of coronary intervention.  Stable without angina pectoris.  Continue with risk factor modification.  2.  Hypertension.  Blood pressures are controlled.  3.  Hyperlipidemia.  On statin therapy with Zetia.  Needs lipid panel sometime this fall.  Continue her current medical regimen.  4.  History of aortic stenosis status post TAVR.  Patient's last echocardiogram was 2 years ago have requested an echocardiogram as long as there are no changes and everything is functional appropriately we will see her in follow-up in 6 to 9 months.    Patient knows to contact me with any problems otherwise if her echo shows no " concerning changes we will see her in 6 to 9 months.

## 2024-09-02 DIAGNOSIS — E78.2 MIXED HYPERLIPIDEMIA: ICD-10-CM

## 2024-09-04 RX ORDER — EZETIMIBE 10 MG/1
10 TABLET ORAL NIGHTLY
Qty: 90 TABLET | Refills: 1 | Status: SHIPPED | OUTPATIENT
Start: 2024-09-04

## 2024-09-25 ENCOUNTER — HOSPITAL ENCOUNTER (OUTPATIENT)
Dept: CARDIOLOGY | Facility: CLINIC | Age: 84
Discharge: HOME | End: 2024-09-25
Payer: MEDICARE

## 2024-09-25 DIAGNOSIS — I25.10 CORONARY ARTERY DISEASE INVOLVING NATIVE CORONARY ARTERY OF NATIVE HEART WITHOUT ANGINA PECTORIS: ICD-10-CM

## 2024-09-25 DIAGNOSIS — Z95.2 S/P TAVR (TRANSCATHETER AORTIC VALVE REPLACEMENT): ICD-10-CM

## 2024-09-25 PROCEDURE — 2500000004 HC RX 250 GENERAL PHARMACY W/ HCPCS (ALT 636 FOR OP/ED): Performed by: INTERNAL MEDICINE

## 2024-09-25 PROCEDURE — 93306 TTE W/DOPPLER COMPLETE: CPT

## 2024-09-25 PROCEDURE — 93306 TTE W/DOPPLER COMPLETE: CPT | Performed by: INTERNAL MEDICINE

## 2024-09-26 LAB
AORTIC VALVE MEAN GRADIENT: 3.3 MMHG
AORTIC VALVE PEAK VELOCITY: 1.21 M/S
AV PEAK GRADIENT: 5.9 MMHG
AVA (PEAK VEL): 2.89 CM2
AVA (VTI): 2.66 CM2
EJECTION FRACTION APICAL 4 CHAMBER: 59.9
EJECTION FRACTION: 58 %
LEFT ATRIUM VOLUME AREA LENGTH INDEX BSA: 27.1 ML/M2
LEFT VENTRICLE INTERNAL DIMENSION DIASTOLE: 3.66 CM (ref 3.5–6)
LEFT VENTRICULAR OUTFLOW TRACT DIAMETER: 1.93 CM
MITRAL VALVE E/A RATIO: 0.81
RIGHT VENTRICLE FREE WALL PEAK S': 10.28 CM/S
RIGHT VENTRICLE PEAK SYSTOLIC PRESSURE: 27.7 MMHG
TRICUSPID ANNULAR PLANE SYSTOLIC EXCURSION: 1.7 CM

## 2024-09-30 ENCOUNTER — TELEPHONE (OUTPATIENT)
Dept: CARDIOLOGY | Facility: CLINIC | Age: 84
End: 2024-09-30
Payer: MEDICARE

## 2024-09-30 NOTE — TELEPHONE ENCOUNTER
----- Message from Amisha Phelps sent at 9/27/2024 10:43 PM EDT -----  Echo shows normal heart function and no significant change in her TAVR graft  ----- Message -----  From: Srikanth, Syngo - Cardiology Results In  Sent: 9/26/2024  10:04 PM EDT  To: Amisha Phelps MD

## 2024-10-01 ENCOUNTER — TELEPHONE (OUTPATIENT)
Dept: CARDIOLOGY | Facility: CLINIC | Age: 84
End: 2024-10-01
Payer: MEDICARE

## 2024-10-01 NOTE — TELEPHONE ENCOUNTER
----- Message from Amisha Phleps sent at 9/27/2024 10:43 PM EDT -----  Echo shows normal heart function and no significant change in her TAVR graft  ----- Message -----  From: Srikanth, Syngo - Cardiology Results In  Sent: 9/26/2024  10:04 PM EDT  To: Amisha Phelps MD

## 2024-10-03 ENCOUNTER — APPOINTMENT (OUTPATIENT)
Dept: PODIATRY | Facility: CLINIC | Age: 84
End: 2024-10-03
Payer: MEDICARE

## 2024-10-10 ENCOUNTER — APPOINTMENT (OUTPATIENT)
Dept: PODIATRY | Facility: CLINIC | Age: 84
End: 2024-10-10
Payer: MEDICARE

## 2024-10-10 DIAGNOSIS — M79.674 PAIN IN TOES OF BOTH FEET: ICD-10-CM

## 2024-10-10 DIAGNOSIS — B35.1 ONYCHOMYCOSIS: Primary | ICD-10-CM

## 2024-10-10 DIAGNOSIS — M20.42 HAMMER TOE OF LEFT FOOT: ICD-10-CM

## 2024-10-10 DIAGNOSIS — M79.675 PAIN IN TOES OF BOTH FEET: ICD-10-CM

## 2024-10-10 PROCEDURE — 99213 OFFICE O/P EST LOW 20 MIN: CPT | Performed by: PODIATRIST

## 2024-10-10 NOTE — PROGRESS NOTES
History Of Present Illness  Betty Stein is a 84 y.o. female presenting with painful elongated nails.    PCP Ronald Lowry MD  Last visit 4/11/24     Past Medical History  She has a past medical history of Encounter for examination for normal comparison and control in clinical research program (01/21/2019), Inflamed seborrheic keratosis (05/14/2020), Ingrowing nail (04/15/2021), Nonrheumatic aortic (valve) stenosis (05/10/2019), and Personal history of other diseases of the musculoskeletal system and connective tissue (11/11/2021).    Surgical History  She has a past surgical history that includes Other surgical history (06/29/2018); Other surgical history (11/11/2021); Other surgical history (11/11/2021); Other surgical history (11/11/2021); Other surgical history (11/11/2021); Other surgical history (11/11/2021); Other surgical history (11/11/2021); Other surgical history (11/11/2021); and Other surgical history (11/18/2021).     Social History  She reports that she has never smoked. She has never been exposed to tobacco smoke. She has never used smokeless tobacco. She reports that she does not currently use alcohol after a past usage of about 1.0 standard drink of alcohol per week. She reports that she does not use drugs.    Family History  Family History   Problem Relation Name Age of Onset    Coronary artery disease Mother      No Known Problems Father          Allergies  Patient has no known allergies.    Medications  Current Outpatient Medications   Medication Sig Dispense Refill    ALPRAZolam (Xanax) 0.25 mg tablet Take 1 tablet (0.25 mg) by mouth 2 times a day as needed for anxiety. 20 tablet 0    amLODIPine (Norvasc) 5 mg tablet Take 1 tablet (5 mg) by mouth once daily. 90 tablet 3    aspirin 81 mg EC tablet Take 1 tablet (81 mg) by mouth once daily. 90 tablet 3    atorvastatin (Lipitor) 20 mg tablet TAKE 1 TABLET AT BEDTIME 90 tablet 1    cholecalciferol (Vitamin D-3) 25 MCG (1000 UT) capsule Take 1  capsule (25 mcg) by mouth once daily.      dorzolamide-timoloL (Cosopt) 22.3-6.8 mg/mL ophthalmic solution Administer 1 drop into affected eye(s) twice a day.      ezetimibe (Zetia) 10 mg tablet TAKE 1 TABLET AT BEDTIME 90 tablet 1    hydrocortisone 2.5 % lotion Apply 1 Application topically twice a day.      irbesartan-hydrochlorothiazide (Avalide) 150-12.5 mg tablet Take 1 tablet by mouth once daily. 90 tablet 3    latanoprost (Xalatan) 0.005 % ophthalmic solution Administer 1 drop into both eyes once daily at bedtime.      multivit-min/ferrous fumarate (MULTI VITAMIN ORAL) Take 1 tablet by mouth once daily.      nitroglycerin (Nitrostat) 0.4 mg SL tablet Place 1 tablet (0.4 mg) under the tongue every 5 minutes if needed for chest pain. TAKE PER DIRECTED 25 tablet 3     No current facility-administered medications for this visit.       Review of Systems    REVIEW OF SYSTEMS  GENERAL:  Negative for malaise, significant weight loss, fever  CARDIOVASCULAR: leg swelling   MUSCULOSKELETAL:  Negative for joint pain or swelling, back pain, and muscle pain.  SKIN:  Negative for lesions, rash, and itching  PSYCH:  Negative for sleep disturbance, mood disorder and recent psychosocial stressors  NEURO: Negative, denies any burning, tingling or numbness     Objective: Shuffling gait   Vasc: DP and PT pulses are palpable bilateral.  CFT is less than 3 seconds bilateral.  Skin temperature is warm to cool proximal to distal bilateral.  Some stasis changes noted    Neuro:  Light touch is intact to the foot bilateral.      Derm: Nails 1-5 bilateral are thickened, elongated and crumbly with subungual debris. Skin is supple with normal texture and turgor noted.  Webspaces are clean, dry and intact bilateral.  There are no hyperkeratoses, ulcerations, verruca or other lesions noted.  She has ulcer left ankle.  Leg bandaged   Ortho: Muscle strength is 5/5 for all pedal groups tested.  Ankle joint, subtalar joint, 1st MPJ and lesser  MPJ ROM is full and without pain or crepitus.  The foot type is rectus bilateral off weight bearing. Hammertoe 5th left toe with callous present medial and lateral toe  Assessment/Plan   Painful nail mycosis  Left 5th hammertoe    Avoid OR, continue padding toe   Toenails are debrided in length and thickness to avoid infection and for pain relief    Pamela Barraza-Magen, MALKA  [address] [place] [address]

## 2024-10-24 ENCOUNTER — APPOINTMENT (OUTPATIENT)
Dept: PRIMARY CARE | Facility: CLINIC | Age: 84
End: 2024-10-24
Payer: MEDICARE

## 2024-10-24 VITALS
HEART RATE: 76 BPM | SYSTOLIC BLOOD PRESSURE: 145 MMHG | TEMPERATURE: 97.3 F | HEIGHT: 68 IN | WEIGHT: 127.8 LBS | BODY MASS INDEX: 19.37 KG/M2 | DIASTOLIC BLOOD PRESSURE: 72 MMHG

## 2024-10-24 DIAGNOSIS — E03.9 ACQUIRED HYPOTHYROIDISM: ICD-10-CM

## 2024-10-24 DIAGNOSIS — Z78.9 NEVER SMOKED CIGARETTES: ICD-10-CM

## 2024-10-24 DIAGNOSIS — I10 PRIMARY HYPERTENSION: ICD-10-CM

## 2024-10-24 DIAGNOSIS — Z00.00 MEDICARE ANNUAL WELLNESS VISIT, SUBSEQUENT: ICD-10-CM

## 2024-10-24 DIAGNOSIS — E78.49 OTHER HYPERLIPIDEMIA: ICD-10-CM

## 2024-10-24 PROCEDURE — 1123F ACP DISCUSS/DSCN MKR DOCD: CPT | Performed by: FAMILY MEDICINE

## 2024-10-24 PROCEDURE — 1170F FXNL STATUS ASSESSED: CPT | Performed by: FAMILY MEDICINE

## 2024-10-24 PROCEDURE — 3077F SYST BP >= 140 MM HG: CPT | Performed by: FAMILY MEDICINE

## 2024-10-24 PROCEDURE — 1036F TOBACCO NON-USER: CPT | Performed by: FAMILY MEDICINE

## 2024-10-24 PROCEDURE — 1159F MED LIST DOCD IN RCRD: CPT | Performed by: FAMILY MEDICINE

## 2024-10-24 PROCEDURE — 3078F DIAST BP <80 MM HG: CPT | Performed by: FAMILY MEDICINE

## 2024-10-24 PROCEDURE — G0439 PPPS, SUBSEQ VISIT: HCPCS | Performed by: FAMILY MEDICINE

## 2024-10-24 PROCEDURE — 1158F ADVNC CARE PLAN TLK DOCD: CPT | Performed by: FAMILY MEDICINE

## 2024-10-24 PROCEDURE — 1160F RVW MEDS BY RX/DR IN RCRD: CPT | Performed by: FAMILY MEDICINE

## 2024-10-24 ASSESSMENT — ENCOUNTER SYMPTOMS
NEUROLOGICAL NEGATIVE: 1
EYES NEGATIVE: 1
PSYCHIATRIC NEGATIVE: 1
GASTROINTESTINAL NEGATIVE: 1
HEMATOLOGIC/LYMPHATIC NEGATIVE: 1
MUSCULOSKELETAL NEGATIVE: 1
ALLERGIC/IMMUNOLOGIC NEGATIVE: 1
RESPIRATORY NEGATIVE: 1
CONSTITUTIONAL NEGATIVE: 1
CARDIOVASCULAR NEGATIVE: 1
ENDOCRINE NEGATIVE: 1

## 2024-10-24 ASSESSMENT — ACTIVITIES OF DAILY LIVING (ADL)
MANAGING_FINANCES: INDEPENDENT
TAKING_MEDICATION: INDEPENDENT
GROCERY_SHOPPING: INDEPENDENT
BATHING: INDEPENDENT
DOING_HOUSEWORK: INDEPENDENT
DRESSING: INDEPENDENT

## 2024-10-24 NOTE — PROGRESS NOTES
Subjective     Patient ID: Betty Stein is a 84 y.o. female who presents for Medicare Annual Wellness Visit Subsequent (AWV):    Problem List Items Addressed This Visit    None     Past Medical History:   Diagnosis Date    Encounter for examination for normal comparison and control in clinical research program 01/21/2019    Research exam    Inflamed seborrheic keratosis 05/14/2020    Seborrheic keratoses, inflamed    Ingrowing nail 04/15/2021    Ingrown toenail    Nonrheumatic aortic (valve) stenosis 05/10/2019    Aortic stenosis, severe    Personal history of other diseases of the musculoskeletal system and connective tissue 11/11/2021    History of bone disorder      Past Surgical History:   Procedure Laterality Date    OTHER SURGICAL HISTORY  06/29/2018    Hip Replacement Bilateral    OTHER SURGICAL HISTORY  11/11/2021    Surgery    OTHER SURGICAL HISTORY  11/11/2021    Cataract surgery    OTHER SURGICAL HISTORY  11/11/2021    Appendectomy    OTHER SURGICAL HISTORY  11/11/2021    Cardiac catheterization    OTHER SURGICAL HISTORY  11/11/2021    Transcatheter aortic valve replacement    OTHER SURGICAL HISTORY  11/11/2021    Percutaneous transluminal coronary angioplasty    OTHER SURGICAL HISTORY  11/11/2021    Phacoemulsification of cataract and insertion of intraocular lens    OTHER SURGICAL HISTORY  11/18/2021    Colonoscopy      Family History   Problem Relation Name Age of Onset    Coronary artery disease Mother      No Known Problems Father        Social History     Socioeconomic History    Marital status:      Spouse name: Not on file    Number of children: Not on file    Years of education: Not on file    Highest education level: Not on file   Occupational History    Not on file   Tobacco Use    Smoking status: Never     Passive exposure: Never    Smokeless tobacco: Never   Vaping Use    Vaping status: Never Used   Substance and Sexual Activity    Alcohol use: Not Currently     Alcohol/week: 1.0  standard drink of alcohol     Types: 1 Standard drinks or equivalent per week     Comment: Gave up the 1 drink    Drug use: Never    Sexual activity: Defer   Other Topics Concern    Not on file   Social History Narrative    Not on file     Social Drivers of Health     Financial Resource Strain: Not on file   Food Insecurity: Not on file   Transportation Needs: Not on file   Physical Activity: Not on file   Stress: Not on file   Social Connections: Not on file   Intimate Partner Violence: Not on file   Housing Stability: Not on file      Patient has no known allergies.   Current Outpatient Medications   Medication Sig Dispense Refill    ALPRAZolam (Xanax) 0.25 mg tablet Take 1 tablet (0.25 mg) by mouth 2 times a day as needed for anxiety. 20 tablet 0    amLODIPine (Norvasc) 5 mg tablet Take 1 tablet (5 mg) by mouth once daily. 90 tablet 3    aspirin 81 mg EC tablet Take 1 tablet (81 mg) by mouth once daily. 90 tablet 3    atorvastatin (Lipitor) 20 mg tablet TAKE 1 TABLET AT BEDTIME 90 tablet 1    cholecalciferol (Vitamin D-3) 25 MCG (1000 UT) capsule Take 1 capsule (25 mcg) by mouth once daily.      dorzolamide-timoloL (Cosopt) 22.3-6.8 mg/mL ophthalmic solution Administer 1 drop into affected eye(s) twice a day.      ezetimibe (Zetia) 10 mg tablet TAKE 1 TABLET AT BEDTIME 90 tablet 1    hydrocortisone 2.5 % lotion Apply 1 Application topically twice a day.      irbesartan-hydrochlorothiazide (Avalide) 150-12.5 mg tablet Take 1 tablet by mouth once daily. 90 tablet 3    latanoprost (Xalatan) 0.005 % ophthalmic solution Administer 1 drop into both eyes once daily at bedtime.      multivit-min/ferrous fumarate (MULTI VITAMIN ORAL) Take 1 tablet by mouth once daily.      nitroglycerin (Nitrostat) 0.4 mg SL tablet Place 1 tablet (0.4 mg) under the tongue every 5 minutes if needed for chest pain. TAKE PER DIRECTED 25 tablet 3     No current facility-administered medications for this visit.       Immunization History    Administered Date(s) Administered    Flu vaccine, quadrivalent, high-dose, preservative free, age 65y+ (FLUZONE) 09/22/2023    Flu vaccine, trivalent, preservative free, HIGH-DOSE, age 65y+ (Fluzone) 09/06/2013, 10/04/2016, 10/02/2017, 09/21/2018, 09/06/2019, 08/27/2020    Influenza, Unspecified 10/15/2009, 09/23/2011, 10/01/2012, 10/01/2013, 09/01/2014, 09/01/2015, 10/01/2016, 10/01/2017, 09/01/2019, 10/14/2021    Influenza, seasonal, injectable 09/02/2014, 10/17/2014, 09/05/2015    Novel influenza-H1N1-09, preservative-free 01/11/2010    Pfizer Gray Cap SARS-CoV-2 10/15/2023    Pfizer Purple Cap SARS-CoV-2 01/26/2021, 02/15/2021, 09/27/2021, 09/16/2022    Pneumococcal conjugate vaccine, 13-valent (PREVNAR 13) 11/24/2015    Pneumococcal polysaccharide vaccine, 23-valent, age 2 years and older (PNEUMOVAX 23) 10/01/2012, 10/17/2014    RSV, 60 Years And Older (AREXVY) 02/27/2024    SARS-CoV-2, Unspecified 04/30/2022    Td (adult), unspecified 06/03/2000    Zoster vaccine, recombinant, adult (SHINGRIX) 02/26/2020, 07/23/2020    Zoster, Unspecified 07/28/2000    Zoster, live 11/27/2007        Review of Systems     Vitals:    10/24/24 1105   BP: 145/72   Pulse: 76   Temp: 36.3 °C (97.3 °F)     Vitals:    10/24/24 1105   Weight: 58 kg (127 lb 12.8 oz)      Physical Exam     ASSESSMENT/PLAN:    Scribe Attestation  By signing my name below, I, Meera Gautam LPN, Scribe   attest that this documentation has been prepared under the direction and in the presence of Ronald Lowry MD.

## 2024-10-24 NOTE — PROGRESS NOTES
Subjective Betty Gomez is here for a annual wellness visit.  She states that she has been overall feeling well and has no new complaints.  She continues on her meds noted.  She sees her cardiologist regularly.  She has not had any recent falling episodes.  She is now living with her  in an independent living facility.    Patient ID: Betty Stein is a 84 y.o. female who presents for Medicare Annual Wellness Visit Subsequent (AWV):    Problem List Items Addressed This Visit    None     Past Medical History:   Diagnosis Date    Encounter for examination for normal comparison and control in clinical research program 01/21/2019    Research exam    Inflamed seborrheic keratosis 05/14/2020    Seborrheic keratoses, inflamed    Ingrowing nail 04/15/2021    Ingrown toenail    Nonrheumatic aortic (valve) stenosis 05/10/2019    Aortic stenosis, severe    Personal history of other diseases of the musculoskeletal system and connective tissue 11/11/2021    History of bone disorder      Past Surgical History:   Procedure Laterality Date    OTHER SURGICAL HISTORY  06/29/2018    Hip Replacement Bilateral    OTHER SURGICAL HISTORY  11/11/2021    Surgery    OTHER SURGICAL HISTORY  11/11/2021    Cataract surgery    OTHER SURGICAL HISTORY  11/11/2021    Appendectomy    OTHER SURGICAL HISTORY  11/11/2021    Cardiac catheterization    OTHER SURGICAL HISTORY  11/11/2021    Transcatheter aortic valve replacement    OTHER SURGICAL HISTORY  11/11/2021    Percutaneous transluminal coronary angioplasty    OTHER SURGICAL HISTORY  11/11/2021    Phacoemulsification of cataract and insertion of intraocular lens    OTHER SURGICAL HISTORY  11/18/2021    Colonoscopy      Family History   Problem Relation Name Age of Onset    Coronary artery disease Mother      No Known Problems Father        Social History     Socioeconomic History    Marital status:      Spouse name: Not on file    Number of children: Not on file    Years of education:  Not on file    Highest education level: Not on file   Occupational History    Not on file   Tobacco Use    Smoking status: Never     Passive exposure: Never    Smokeless tobacco: Never   Vaping Use    Vaping status: Never Used   Substance and Sexual Activity    Alcohol use: Not Currently     Alcohol/week: 1.0 standard drink of alcohol     Types: 1 Standard drinks or equivalent per week     Comment: Gave up the 1 drink    Drug use: Never    Sexual activity: Defer   Other Topics Concern    Not on file   Social History Narrative    Not on file     Social Drivers of Health     Financial Resource Strain: Not on file   Food Insecurity: Not on file   Transportation Needs: Not on file   Physical Activity: Not on file   Stress: Not on file   Social Connections: Not on file   Intimate Partner Violence: Not on file   Housing Stability: Not on file      Patient has no known allergies.   Current Outpatient Medications   Medication Sig Dispense Refill    ALPRAZolam (Xanax) 0.25 mg tablet Take 1 tablet (0.25 mg) by mouth 2 times a day as needed for anxiety. 20 tablet 0    amLODIPine (Norvasc) 5 mg tablet Take 1 tablet (5 mg) by mouth once daily. 90 tablet 3    aspirin 81 mg EC tablet Take 1 tablet (81 mg) by mouth once daily. 90 tablet 3    atorvastatin (Lipitor) 20 mg tablet TAKE 1 TABLET AT BEDTIME 90 tablet 1    cholecalciferol (Vitamin D-3) 25 MCG (1000 UT) capsule Take 1 capsule (25 mcg) by mouth once daily.      dorzolamide-timoloL (Cosopt) 22.3-6.8 mg/mL ophthalmic solution Administer 1 drop into affected eye(s) twice a day.      ezetimibe (Zetia) 10 mg tablet TAKE 1 TABLET AT BEDTIME 90 tablet 1    hydrocortisone 2.5 % lotion Apply 1 Application topically twice a day.      irbesartan-hydrochlorothiazide (Avalide) 150-12.5 mg tablet Take 1 tablet by mouth once daily. 90 tablet 3    latanoprost (Xalatan) 0.005 % ophthalmic solution Administer 1 drop into both eyes once daily at bedtime.      multivit-min/ferrous fumarate  (MULTI VITAMIN ORAL) Take 1 tablet by mouth once daily.      nitroglycerin (Nitrostat) 0.4 mg SL tablet Place 1 tablet (0.4 mg) under the tongue every 5 minutes if needed for chest pain. TAKE PER DIRECTED 25 tablet 3     No current facility-administered medications for this visit.       Immunization History   Administered Date(s) Administered    Flu vaccine, quadrivalent, high-dose, preservative free, age 65y+ (FLUZONE) 09/22/2023    Flu vaccine, trivalent, preservative free, HIGH-DOSE, age 65y+ (Fluzone) 09/06/2013, 10/04/2016, 10/02/2017, 09/21/2018, 09/06/2019, 08/27/2020    Influenza, Unspecified 10/15/2009, 09/23/2011, 10/01/2012, 10/01/2013, 09/01/2014, 09/01/2015, 10/01/2016, 10/01/2017, 09/01/2019, 10/14/2021    Influenza, seasonal, injectable 09/02/2014, 10/17/2014, 09/05/2015    Novel influenza-H1N1-09, preservative-free 01/11/2010    Pfizer Gray Cap SARS-CoV-2 10/15/2023    Pfizer Purple Cap SARS-CoV-2 01/26/2021, 02/15/2021, 09/27/2021, 09/16/2022    Pneumococcal conjugate vaccine, 13-valent (PREVNAR 13) 11/24/2015    Pneumococcal polysaccharide vaccine, 23-valent, age 2 years and older (PNEUMOVAX 23) 10/01/2012, 10/17/2014    RSV, 60 Years And Older (AREXVY) 02/27/2024    SARS-CoV-2, Unspecified 04/30/2022    Td (adult), unspecified 06/03/2000    Zoster vaccine, recombinant, adult (SHINGRIX) 02/26/2020, 07/23/2020    Zoster, Unspecified 07/28/2000    Zoster, live 11/27/2007        Review of Systems   Constitutional: Negative.    HENT: Negative.     Eyes: Negative.    Respiratory: Negative.     Cardiovascular: Negative.    Gastrointestinal: Negative.    Endocrine: Negative.    Genitourinary: Negative.    Musculoskeletal: Negative.    Skin: Negative.    Allergic/Immunologic: Negative.    Neurological: Negative.    Hematological: Negative.    Psychiatric/Behavioral: Negative.     All other systems reviewed and are negative.       Vitals:    10/24/24 1105   BP: 145/72   Pulse: 76   Temp: 36.3 °C (97.3 °F)      Vitals:    10/24/24 1105   Weight: 58 kg (127 lb 12.8 oz)      Physical Exam  Constitutional:       General: She is not in acute distress.     Appearance: Normal appearance.   Neck:      Vascular: No carotid bruit.   Cardiovascular:      Rate and Rhythm: Normal rate and regular rhythm.      Pulses: Normal pulses.      Heart sounds: Normal heart sounds. No murmur heard.     No friction rub. No gallop.   Pulmonary:      Effort: Pulmonary effort is normal. No respiratory distress.      Breath sounds: Normal breath sounds. No wheezing or rales.   Musculoskeletal:      Cervical back: Neck supple.   Neurological:      General: No focal deficit present.      Mental Status: She is alert and oriented to person, place, and time. Mental status is at baseline.          ASSESSMENT/PLAN: Wellness visit.  Check CBC CMP lipid profile and TSH.  Patient defers further mammograms.  Recommended Tdap immunization update.  Eye exams are up-to-date.    Hypertension with slightly elevated reading.  Continue current meds as noted.    Hyperlipidemia.  Continue atorvastatin daily.  Check lipid profile.    Unsteady gait.  We again discussed risk for falling.  Recommended physical therapy evaluation.    Chronic anxiety disorder.  Patient currently using alprazolam as needed.  She does understand potential side effects and risk for habituation    Follow-up with cardiology  Follow-up 6 months and call as needed

## 2024-10-25 ENCOUNTER — LAB (OUTPATIENT)
Dept: LAB | Facility: LAB | Age: 84
End: 2024-10-25
Payer: MEDICARE

## 2024-10-25 ENCOUNTER — TELEPHONE (OUTPATIENT)
Dept: PRIMARY CARE | Facility: CLINIC | Age: 84
End: 2024-10-25

## 2024-10-25 DIAGNOSIS — I10 PRIMARY HYPERTENSION: ICD-10-CM

## 2024-10-25 DIAGNOSIS — Z00.00 MEDICARE ANNUAL WELLNESS VISIT, SUBSEQUENT: ICD-10-CM

## 2024-10-25 DIAGNOSIS — E03.9 ACQUIRED HYPOTHYROIDISM: ICD-10-CM

## 2024-10-25 DIAGNOSIS — E78.49 OTHER HYPERLIPIDEMIA: ICD-10-CM

## 2024-10-25 LAB
ALBUMIN SERPL BCP-MCNC: 4.3 G/DL (ref 3.4–5)
ALP SERPL-CCNC: 51 U/L (ref 33–136)
ALT SERPL W P-5'-P-CCNC: 13 U/L (ref 7–45)
ANION GAP SERPL CALC-SCNC: 13 MMOL/L (ref 10–20)
AST SERPL W P-5'-P-CCNC: 19 U/L (ref 9–39)
BILIRUB SERPL-MCNC: 0.6 MG/DL (ref 0–1.2)
BUN SERPL-MCNC: 35 MG/DL (ref 6–23)
CALCIUM SERPL-MCNC: 9.5 MG/DL (ref 8.6–10.3)
CHLORIDE SERPL-SCNC: 102 MMOL/L (ref 98–107)
CHOLEST SERPL-MCNC: 203 MG/DL (ref 0–199)
CHOLESTEROL/HDL RATIO: 3.1
CO2 SERPL-SCNC: 29 MMOL/L (ref 21–32)
CREAT SERPL-MCNC: 1.14 MG/DL (ref 0.5–1.05)
EGFRCR SERPLBLD CKD-EPI 2021: 47 ML/MIN/1.73M*2
ERYTHROCYTE [DISTWIDTH] IN BLOOD BY AUTOMATED COUNT: 12 % (ref 11.5–14.5)
GLUCOSE SERPL-MCNC: 95 MG/DL (ref 74–99)
HCT VFR BLD AUTO: 42.8 % (ref 36–46)
HDLC SERPL-MCNC: 66 MG/DL
HGB BLD-MCNC: 14 G/DL (ref 12–16)
LDLC SERPL CALC-MCNC: 108 MG/DL
MCH RBC QN AUTO: 31.3 PG (ref 26–34)
MCHC RBC AUTO-ENTMCNC: 32.7 G/DL (ref 32–36)
MCV RBC AUTO: 96 FL (ref 80–100)
NON HDL CHOLESTEROL: 137 MG/DL (ref 0–149)
NRBC BLD-RTO: 0 /100 WBCS (ref 0–0)
PLATELET # BLD AUTO: 192 X10*3/UL (ref 150–450)
POTASSIUM SERPL-SCNC: 4.2 MMOL/L (ref 3.5–5.3)
PROT SERPL-MCNC: 6.8 G/DL (ref 6.4–8.2)
RBC # BLD AUTO: 4.47 X10*6/UL (ref 4–5.2)
SODIUM SERPL-SCNC: 140 MMOL/L (ref 136–145)
T4 FREE SERPL-MCNC: 0.76 NG/DL (ref 0.61–1.12)
TRIGL SERPL-MCNC: 143 MG/DL (ref 0–149)
TSH SERPL-ACNC: 4.24 MIU/L (ref 0.44–3.98)
VLDL: 29 MG/DL (ref 0–40)
WBC # BLD AUTO: 5.9 X10*3/UL (ref 4.4–11.3)

## 2024-10-25 PROCEDURE — 84443 ASSAY THYROID STIM HORMONE: CPT

## 2024-10-25 PROCEDURE — 80053 COMPREHEN METABOLIC PANEL: CPT

## 2024-10-25 PROCEDURE — 36415 COLL VENOUS BLD VENIPUNCTURE: CPT

## 2024-10-25 PROCEDURE — 85027 COMPLETE CBC AUTOMATED: CPT

## 2024-10-25 PROCEDURE — 80061 LIPID PANEL: CPT

## 2024-10-25 PROCEDURE — 84439 ASSAY OF FREE THYROXINE: CPT

## 2024-10-25 NOTE — TELEPHONE ENCOUNTER
----- Message from Ronald Lowry sent at 10/25/2024 11:44 AM EDT -----  Labs normal exc sl elevated chol.  Continue current meds and follow diet closely.

## 2024-11-18 DIAGNOSIS — I10 ESSENTIAL HYPERTENSION: ICD-10-CM

## 2024-11-19 RX ORDER — AMLODIPINE BESYLATE 5 MG/1
5 TABLET ORAL DAILY
Qty: 90 TABLET | Refills: 3 | Status: SHIPPED | OUTPATIENT
Start: 2024-11-19 | End: 2025-11-19

## 2024-11-27 DIAGNOSIS — F41.1 GENERALIZED ANXIETY DISORDER: ICD-10-CM

## 2024-11-30 RX ORDER — ALPRAZOLAM 0.25 MG/1
0.25 TABLET ORAL 2 TIMES DAILY PRN
Qty: 20 TABLET | Refills: 0 | OUTPATIENT
Start: 2024-11-30

## 2024-12-04 ENCOUNTER — TELEPHONE (OUTPATIENT)
Dept: PRIMARY CARE | Facility: CLINIC | Age: 84
End: 2024-12-04
Payer: MEDICARE

## 2024-12-04 DIAGNOSIS — F41.1 GENERALIZED ANXIETY DISORDER: ICD-10-CM

## 2024-12-04 NOTE — TELEPHONE ENCOUNTER
Rx Refill Request Telephone Encounter    Name:  Betty Stein  :  [id number]  Medication Name:    ALPRAZolam (Xanax) 0.25 mg tablet       Specific Pharmacy location:      [facility] [address] [place] [id number] [facility] Phone: [phone]   Fax: [phone]        Date of last appointment:  10/24/24

## 2024-12-06 RX ORDER — ALPRAZOLAM 0.25 MG/1
0.25 TABLET ORAL 2 TIMES DAILY PRN
Qty: 20 TABLET | Refills: 0 | Status: SHIPPED | OUTPATIENT
Start: 2024-12-06

## 2024-12-19 ENCOUNTER — APPOINTMENT (OUTPATIENT)
Dept: PODIATRY | Facility: CLINIC | Age: 84
End: 2024-12-19
Payer: MEDICARE

## 2024-12-20 ENCOUNTER — TELEPHONE (OUTPATIENT)
Dept: PODIATRY | Facility: CLINIC | Age: 84
End: 2024-12-20
Payer: MEDICARE

## 2024-12-20 NOTE — TELEPHONE ENCOUNTER
Ava called, one of her nails is loose and almost all the way off.  She has a band aid on it, she is wondering if she should just continue putting a band aid on it until she sees you on the 9th.

## 2025-01-01 ENCOUNTER — APPOINTMENT (OUTPATIENT)
Dept: RADIOLOGY | Facility: HOSPITAL | Age: 85
DRG: 283 | End: 2025-01-01
Payer: MEDICARE

## 2025-01-01 ENCOUNTER — ANESTHESIA EVENT (OUTPATIENT)
Dept: CARDIOLOGY | Facility: HOSPITAL | Age: 85
End: 2025-01-01
Payer: MEDICARE

## 2025-01-01 ENCOUNTER — APPOINTMENT (OUTPATIENT)
Dept: CARDIOLOGY | Facility: HOSPITAL | Age: 85
DRG: 283 | End: 2025-01-01
Payer: MEDICARE

## 2025-01-01 ENCOUNTER — HOSPITAL ENCOUNTER (INPATIENT)
Facility: HOSPITAL | Age: 85
LOS: 4 days | DRG: 283 | End: 2025-07-18
Attending: EMERGENCY MEDICINE | Admitting: INTERNAL MEDICINE
Payer: MEDICARE

## 2025-01-01 ENCOUNTER — HOSPITAL ENCOUNTER (INPATIENT)
Facility: HOSPITAL | Age: 85
End: 2025-01-01
Attending: STUDENT IN AN ORGANIZED HEALTH CARE EDUCATION/TRAINING PROGRAM | Admitting: STUDENT IN AN ORGANIZED HEALTH CARE EDUCATION/TRAINING PROGRAM
Payer: MEDICARE

## 2025-01-01 ENCOUNTER — ANESTHESIA (OUTPATIENT)
Dept: CARDIOLOGY | Facility: HOSPITAL | Age: 85
End: 2025-01-01
Payer: MEDICARE

## 2025-01-01 VITALS
WEIGHT: 127.87 LBS | SYSTOLIC BLOOD PRESSURE: 172 MMHG | BODY MASS INDEX: 20.07 KG/M2 | TEMPERATURE: 97 F | DIASTOLIC BLOOD PRESSURE: 61 MMHG | HEIGHT: 67 IN

## 2025-01-01 DIAGNOSIS — I21.11 ST ELEVATION (STEMI) MYOCARDIAL INFARCTION INVOLVING RIGHT CORONARY ARTERY (MULTI): ICD-10-CM

## 2025-01-01 DIAGNOSIS — T82.01XA PROSTHETIC HEART VALVE FAILURE, INITIAL ENCOUNTER: ICD-10-CM

## 2025-01-01 DIAGNOSIS — I21.3 STEMI (ST ELEVATION MYOCARDIAL INFARCTION) (MULTI): ICD-10-CM

## 2025-01-01 DIAGNOSIS — J96.01 ACUTE HYPOXIC RESPIRATORY FAILURE: ICD-10-CM

## 2025-01-01 DIAGNOSIS — J81.0 FLASH PULMONARY EDEMA: Primary | ICD-10-CM

## 2025-01-01 DIAGNOSIS — R57.0 CARDIOGENIC SHOCK (MULTI): ICD-10-CM

## 2025-01-01 DIAGNOSIS — I21.3 ST ELEVATION MYOCARDIAL INFARCTION (STEMI), UNSPECIFIED ARTERY (MULTI): ICD-10-CM

## 2025-01-01 DIAGNOSIS — I35.1 AORTIC VALVE INSUFFICIENCY, ETIOLOGY OF CARDIAC VALVE DISEASE UNSPECIFIED: ICD-10-CM

## 2025-01-01 DIAGNOSIS — I35.1 NONRHEUMATIC AORTIC VALVE INSUFFICIENCY: ICD-10-CM

## 2025-01-01 DIAGNOSIS — Z95.2 S/P TAVR (TRANSCATHETER AORTIC VALVE REPLACEMENT): ICD-10-CM

## 2025-01-01 LAB
ALBUMIN SERPL BCP-MCNC: 3.1 G/DL (ref 3.4–5)
ALBUMIN SERPL BCP-MCNC: 3.6 G/DL (ref 3.4–5)
ALBUMIN SERPL BCP-MCNC: 3.7 G/DL (ref 3.4–5)
ALBUMIN SERPL BCP-MCNC: 3.7 G/DL (ref 3.4–5)
ALBUMIN SERPL BCP-MCNC: 4.4 G/DL (ref 3.4–5)
ALP SERPL-CCNC: 46 U/L (ref 33–136)
ALP SERPL-CCNC: 48 U/L (ref 33–136)
ALP SERPL-CCNC: 50 U/L (ref 33–136)
ALT SERPL W P-5'-P-CCNC: 15 U/L (ref 7–45)
ALT SERPL W P-5'-P-CCNC: 15 U/L (ref 7–45)
ALT SERPL W P-5'-P-CCNC: 16 U/L (ref 7–45)
ANION GAP BLDA CALCULATED.4IONS-SCNC: 15 MMO/L (ref 10–25)
ANION GAP BLDA CALCULATED.4IONS-SCNC: 15 MMO/L (ref 10–25)
ANION GAP BLDV CALCULATED.4IONS-SCNC: 7 MMOL/L (ref 10–25)
ANION GAP SERPL CALC-SCNC: 14 MMOL/L (ref 10–20)
ANION GAP SERPL CALC-SCNC: 15 MMOL/L (ref 10–20)
ANION GAP SERPL CALC-SCNC: 16 MMOL/L (ref 10–20)
ANION GAP SERPL CALC-SCNC: 18 MMOL/L (ref 10–20)
AORTIC VALVE MEAN GRADIENT: 3 MMHG
AORTIC VALVE PEAK VELOCITY: 1.79 M/S
APPEARANCE UR: CLEAR
ARTERIAL PATENCY WRIST A: ABNORMAL
AST SERPL W P-5'-P-CCNC: 25 U/L (ref 9–39)
AST SERPL W P-5'-P-CCNC: 29 U/L (ref 9–39)
AST SERPL W P-5'-P-CCNC: 54 U/L (ref 9–39)
AST SERPL W P-5'-P-CCNC: 59 U/L (ref 9–39)
AST SERPL W P-5'-P-CCNC: 69 U/L (ref 9–39)
ATRIAL RATE: 81 BPM
ATRIAL RATE: 90 BPM
AV PEAK GRADIENT: 13 MMHG
AVA (PEAK VEL): 1.22 CM2
AVA (VTI): 2.02 CM2
BACTERIA #/AREA URNS AUTO: ABNORMAL /HPF
BACTERIA UR CULT: NO GROWTH
BASE EXCESS BLDA CALC-SCNC: -3.7 MMOL/L (ref -2–3)
BASE EXCESS BLDA CALC-SCNC: -3.9 MMOL/L (ref -2–3)
BASE EXCESS BLDV CALC-SCNC: 2.4 MMOL/L (ref -2–3)
BASOPHILS # BLD AUTO: 0.04 X10*3/UL (ref 0–0.1)
BASOPHILS # BLD AUTO: 0.05 X10*3/UL (ref 0–0.1)
BASOPHILS NFR BLD AUTO: 0.3 %
BASOPHILS NFR BLD AUTO: 0.6 %
BILIRUB DIRECT SERPL-MCNC: 0.2 MG/DL (ref 0–0.3)
BILIRUB SERPL-MCNC: 0.5 MG/DL (ref 0–1.2)
BILIRUB SERPL-MCNC: 0.7 MG/DL (ref 0–1.2)
BILIRUB UR STRIP.AUTO-MCNC: NEGATIVE MG/DL
BNP SERPL-MCNC: 802 PG/ML (ref 0–99)
BODY TEMPERATURE: ABNORMAL
BUN SERPL-MCNC: 34 MG/DL (ref 6–23)
BUN SERPL-MCNC: 39 MG/DL (ref 6–23)
BUN SERPL-MCNC: 53 MG/DL (ref 6–23)
BUN SERPL-MCNC: 67 MG/DL (ref 6–23)
CA-I BLDA-SCNC: 1.12 MMOL/L (ref 1.1–1.33)
CA-I BLDA-SCNC: 1.17 MMOL/L (ref 1.1–1.33)
CA-I BLDV-SCNC: 1.2 MMOL/L (ref 1.1–1.33)
CALCIUM SERPL-MCNC: 8.5 MG/DL (ref 8.6–10.3)
CALCIUM SERPL-MCNC: 9 MG/DL (ref 8.6–10.3)
CALCIUM SERPL-MCNC: 9.2 MG/DL (ref 8.6–10.3)
CALCIUM SERPL-MCNC: 9.8 MG/DL (ref 8.6–10.3)
CARDIAC TROPONIN I PNL SERPL HS: 1354 NG/L (ref 0–13)
CARDIAC TROPONIN I PNL SERPL HS: 29 NG/L (ref 0–13)
CARDIAC TROPONIN I PNL SERPL HS: 4378 NG/L (ref 0–13)
CARDIAC TROPONIN I PNL SERPL HS: 4624 NG/L (ref 0–13)
CARDIAC TROPONIN I PNL SERPL HS: 6251 NG/L (ref 0–13)
CARDIAC TROPONIN I PNL SERPL HS: 7756 NG/L (ref 0–13)
CHLORIDE BLDA-SCNC: 100 MMOL/L (ref 98–107)
CHLORIDE BLDA-SCNC: 101 MMOL/L (ref 98–107)
CHLORIDE BLDV-SCNC: 103 MMOL/L (ref 98–107)
CHLORIDE SERPL-SCNC: 100 MMOL/L (ref 98–107)
CHLORIDE SERPL-SCNC: 101 MMOL/L (ref 98–107)
CHLORIDE SERPL-SCNC: 103 MMOL/L (ref 98–107)
CHLORIDE SERPL-SCNC: 103 MMOL/L (ref 98–107)
CO2 SERPL-SCNC: 20 MMOL/L (ref 21–32)
CO2 SERPL-SCNC: 22 MMOL/L (ref 21–32)
CO2 SERPL-SCNC: 23 MMOL/L (ref 21–32)
CO2 SERPL-SCNC: 27 MMOL/L (ref 21–32)
COHGB MFR BLDA: 1.7 %
COLOR UR: YELLOW
CREAT SERPL-MCNC: 1.21 MG/DL (ref 0.5–1.05)
CREAT SERPL-MCNC: 1.35 MG/DL (ref 0.5–1.05)
CREAT SERPL-MCNC: 1.41 MG/DL (ref 0.5–1.05)
CREAT SERPL-MCNC: 1.51 MG/DL (ref 0.5–1.05)
DO-HGB MFR BLDA: 5.7 % (ref 0–5)
EGFRCR SERPLBLD CKD-EPI 2021: 34 ML/MIN/1.73M*2
EGFRCR SERPLBLD CKD-EPI 2021: 36 ML/MIN/1.73M*2
EGFRCR SERPLBLD CKD-EPI 2021: 38 ML/MIN/1.73M*2
EGFRCR SERPLBLD CKD-EPI 2021: 44 ML/MIN/1.73M*2
EJECTION FRACTION APICAL 4 CHAMBER: 67
EJECTION FRACTION: 65 %
EJECTION FRACTION: 68 %
EOSINOPHIL # BLD AUTO: 0.16 X10*3/UL (ref 0–0.4)
EOSINOPHIL # BLD AUTO: 0.61 X10*3/UL (ref 0–0.4)
EOSINOPHIL NFR BLD AUTO: 2.3 %
EOSINOPHIL NFR BLD AUTO: 3.4 %
ERYTHROCYTE [DISTWIDTH] IN BLOOD BY AUTOMATED COUNT: 12.1 % (ref 11.5–14.5)
ERYTHROCYTE [DISTWIDTH] IN BLOOD BY AUTOMATED COUNT: 12.2 % (ref 11.5–14.5)
ERYTHROCYTE [DISTWIDTH] IN BLOOD BY AUTOMATED COUNT: 12.4 % (ref 11.5–14.5)
ERYTHROCYTE [DISTWIDTH] IN BLOOD BY AUTOMATED COUNT: 12.4 % (ref 11.5–14.5)
FLUAV RNA RESP QL NAA+PROBE: NOT DETECTED
FLUBV RNA RESP QL NAA+PROBE: NOT DETECTED
GIANT PLATELETS BLD QL SMEAR: NORMAL
GLUCOSE BLD MANUAL STRIP-MCNC: 105 MG/DL (ref 74–99)
GLUCOSE BLD MANUAL STRIP-MCNC: 112 MG/DL (ref 74–99)
GLUCOSE BLD MANUAL STRIP-MCNC: 114 MG/DL (ref 74–99)
GLUCOSE BLD MANUAL STRIP-MCNC: 115 MG/DL (ref 74–99)
GLUCOSE BLD MANUAL STRIP-MCNC: 115 MG/DL (ref 74–99)
GLUCOSE BLD MANUAL STRIP-MCNC: 122 MG/DL (ref 74–99)
GLUCOSE BLD MANUAL STRIP-MCNC: 130 MG/DL (ref 74–99)
GLUCOSE BLDA-MCNC: 133 MG/DL (ref 74–99)
GLUCOSE BLDA-MCNC: 199 MG/DL (ref 74–99)
GLUCOSE BLDV-MCNC: 141 MG/DL (ref 74–99)
GLUCOSE SERPL-MCNC: 122 MG/DL (ref 74–99)
GLUCOSE SERPL-MCNC: 129 MG/DL (ref 74–99)
GLUCOSE SERPL-MCNC: 129 MG/DL (ref 74–99)
GLUCOSE SERPL-MCNC: 136 MG/DL (ref 74–99)
GLUCOSE UR STRIP.AUTO-MCNC: NORMAL MG/DL
HCO3 BLDA-SCNC: 19 MMOL/L (ref 22–26)
HCO3 BLDA-SCNC: 19.8 MMOL/L (ref 22–26)
HCO3 BLDV-SCNC: 27.9 MMOL/L (ref 22–26)
HCT VFR BLD AUTO: 40.6 % (ref 36–46)
HCT VFR BLD AUTO: 47.8 % (ref 36–46)
HCT VFR BLD AUTO: 48.1 % (ref 36–46)
HCT VFR BLD AUTO: 53 % (ref 36–46)
HCT VFR BLD EST: 44 % (ref 36–46)
HCT VFR BLD EST: 47 % (ref 36–46)
HCT VFR BLD EST: 48 % (ref 36–46)
HGB BLD-MCNC: 13.8 G/DL (ref 12–16)
HGB BLD-MCNC: 15.5 G/DL (ref 12–16)
HGB BLD-MCNC: 15.8 G/DL (ref 12–16)
HGB BLD-MCNC: 16.9 G/DL (ref 12–16)
HGB BLDA-MCNC: 14.5 G/DL (ref 12–16)
HGB BLDA-MCNC: 15.8 G/DL (ref 12–16)
HGB BLDA-MCNC: 16.1 G/DL (ref 12–16)
HGB BLDV-MCNC: 16 G/DL (ref 12–16)
HOLD SPECIMEN: NORMAL
HYALINE CASTS #/AREA URNS AUTO: ABNORMAL /LPF
IMM GRANULOCYTES # BLD AUTO: 0.02 X10*3/UL (ref 0–0.5)
IMM GRANULOCYTES # BLD AUTO: 0.09 X10*3/UL (ref 0–0.5)
IMM GRANULOCYTES NFR BLD AUTO: 0.3 % (ref 0–0.9)
IMM GRANULOCYTES NFR BLD AUTO: 0.5 % (ref 0–0.9)
INHALED O2 CONCENTRATION: 100 %
INHALED O2 CONCENTRATION: 65 %
KETONES UR STRIP.AUTO-MCNC: NEGATIVE MG/DL
LACTATE BLDA-SCNC: 1.4 MMOL/L (ref 0.4–2)
LACTATE BLDA-SCNC: 2.7 MMOL/L (ref 0.4–2)
LACTATE BLDV-SCNC: 1.8 MMOL/L (ref 0.4–2)
LACTATE SERPL-SCNC: 2 MMOL/L (ref 0.4–2)
LEFT ATRIUM VOLUME AREA LENGTH INDEX BSA: 31.4 ML/M2
LEFT VENTRICLE INTERNAL DIMENSION DIASTOLE: 4.6 CM (ref 3.5–6)
LEFT VENTRICULAR OUTFLOW TRACT DIAMETER: 1.7 CM
LEGIONELLA AG UR QL: NEGATIVE
LEUKOCYTE ESTERASE UR QL STRIP.AUTO: ABNORMAL
LV EJECTION FRACTION BIPLANE: 65 %
LYMPHOCYTES # BLD AUTO: 1.39 X10*3/UL (ref 0.8–3)
LYMPHOCYTES # BLD AUTO: 1.86 X10*3/UL (ref 0.8–3)
LYMPHOCYTES NFR BLD AUTO: 26.8 %
LYMPHOCYTES NFR BLD AUTO: 7.8 %
MAGNESIUM SERPL-MCNC: 2.25 MG/DL (ref 1.6–2.4)
MAGNESIUM SERPL-MCNC: 2.39 MG/DL (ref 1.6–2.4)
MAGNESIUM SERPL-MCNC: 2.39 MG/DL (ref 1.6–2.4)
MAGNESIUM SERPL-MCNC: 2.46 MG/DL (ref 1.6–2.4)
MAGNESIUM SERPL-MCNC: 2.48 MG/DL (ref 1.6–2.4)
MCH RBC QN AUTO: 31.7 PG (ref 26–34)
MCH RBC QN AUTO: 31.9 PG (ref 26–34)
MCHC RBC AUTO-ENTMCNC: 31.9 G/DL (ref 32–36)
MCHC RBC AUTO-ENTMCNC: 32.2 G/DL (ref 32–36)
MCHC RBC AUTO-ENTMCNC: 33.1 G/DL (ref 32–36)
MCHC RBC AUTO-ENTMCNC: 34 G/DL (ref 32–36)
MCV RBC AUTO: 100 FL (ref 80–100)
MCV RBC AUTO: 93 FL (ref 80–100)
MCV RBC AUTO: 97 FL (ref 80–100)
MCV RBC AUTO: 99 FL (ref 80–100)
METHGB MFR BLDA: 1.1 % (ref 0–1.5)
MITRAL VALVE E/A RATIO: 1.58
MONOCYTES # BLD AUTO: 0.4 X10*3/UL (ref 0.05–0.8)
MONOCYTES # BLD AUTO: 1.36 X10*3/UL (ref 0.05–0.8)
MONOCYTES NFR BLD AUTO: 5.8 %
MONOCYTES NFR BLD AUTO: 7.6 %
MUCOUS THREADS #/AREA URNS AUTO: ABNORMAL /LPF
NEUTROPHILS # BLD AUTO: 14.43 X10*3/UL (ref 1.6–5.5)
NEUTROPHILS # BLD AUTO: 4.45 X10*3/UL (ref 1.6–5.5)
NEUTROPHILS NFR BLD AUTO: 64.2 %
NEUTROPHILS NFR BLD AUTO: 80.4 %
NITRITE UR QL STRIP.AUTO: NEGATIVE
NRBC BLD-RTO: 0 /100 WBCS (ref 0–0)
OXYHGB MFR BLDA: 91.5 % (ref 94–98)
OXYHGB MFR BLDA: 91.6 % (ref 94–98)
OXYHGB MFR BLDA: 92.1 % (ref 94–98)
OXYHGB MFR BLDV: 35.4 % (ref 45–75)
P AXIS: 1 DEGREES
P AXIS: 74 DEGREES
P OFFSET: 212 MS
P OFFSET: 216 MS
P ONSET: 157 MS
P ONSET: 162 MS
PCO2 BLDA: 28 MM HG (ref 38–42)
PCO2 BLDA: 32 MM HG (ref 38–42)
PCO2 BLDV: 45 MM HG (ref 41–51)
PEEP CMH2O: 5 CM H2O
PH BLDA: 7.4 PH (ref 7.38–7.42)
PH BLDA: 7.44 PH (ref 7.38–7.42)
PH BLDV: 7.4 PH (ref 7.33–7.43)
PH UR STRIP.AUTO: 5.5 [PH]
PHOSPHATE SERPL-MCNC: 4.2 MG/DL (ref 2.5–4.9)
PHOSPHATE SERPL-MCNC: 4.8 MG/DL (ref 2.5–4.9)
PHOSPHATE SERPL-MCNC: 5.1 MG/DL (ref 2.5–4.9)
PLATELET # BLD AUTO: 156 X10*3/UL (ref 150–450)
PLATELET # BLD AUTO: 183 X10*3/UL (ref 150–450)
PLATELET # BLD AUTO: 189 X10*3/UL (ref 150–450)
PLATELET # BLD AUTO: 228 X10*3/UL (ref 150–450)
PO2 BLDA: 66 MM HG (ref 85–95)
PO2 BLDA: 69 MM HG (ref 85–95)
PO2 BLDV: 25 MM HG (ref 35–45)
POTASSIUM BLDA-SCNC: 4.3 MMOL/L (ref 3.5–5.3)
POTASSIUM BLDA-SCNC: 4.7 MMOL/L (ref 3.5–5.3)
POTASSIUM BLDV-SCNC: 5.2 MMOL/L (ref 3.5–5.3)
POTASSIUM SERPL-SCNC: 3.8 MMOL/L (ref 3.5–5.3)
POTASSIUM SERPL-SCNC: 4.2 MMOL/L (ref 3.5–5.3)
POTASSIUM SERPL-SCNC: 4.2 MMOL/L (ref 3.5–5.3)
POTASSIUM SERPL-SCNC: 4.3 MMOL/L (ref 3.5–5.3)
PR INTERVAL: 122 MS
PR INTERVAL: 134 MS
PROCALCITONIN SERPL-MCNC: 0.14 NG/ML
PROT SERPL-MCNC: 5.7 G/DL (ref 6.4–8.2)
PROT SERPL-MCNC: 6.4 G/DL (ref 6.4–8.2)
PROT SERPL-MCNC: 6.5 G/DL (ref 6.4–8.2)
PROT SERPL-MCNC: 6.6 G/DL (ref 6.4–8.2)
PROT SERPL-MCNC: 7.5 G/DL (ref 6.4–8.2)
PROT UR STRIP.AUTO-MCNC: ABNORMAL MG/DL
Q ONSET: 223 MS
Q ONSET: 224 MS
QRS COUNT: 13 BEATS
QRS COUNT: 15 BEATS
QRS DURATION: 74 MS
QRS DURATION: 76 MS
QT INTERVAL: 352 MS
QT INTERVAL: 386 MS
QTC CALCULATION(BAZETT): 408 MS
QTC CALCULATION(BAZETT): 472 MS
QTC FREDERICIA: 388 MS
QTC FREDERICIA: 441 MS
R AXIS: 2 DEGREES
R AXIS: 61 DEGREES
RBC # BLD AUTO: 4.35 X10*6/UL (ref 4–5.2)
RBC # BLD AUTO: 4.86 X10*6/UL (ref 4–5.2)
RBC # BLD AUTO: 4.95 X10*6/UL (ref 4–5.2)
RBC # BLD AUTO: 5.3 X10*6/UL (ref 4–5.2)
RBC # UR STRIP.AUTO: ABNORMAL MG/DL
RBC #/AREA URNS AUTO: ABNORMAL /HPF
RBC MORPH BLD: NORMAL
RIGHT VENTRICLE FREE WALL PEAK S': 14.6 CM/S
RIGHT VENTRICLE PEAK SYSTOLIC PRESSURE: 24 MMHG
RIGHT VENTRICLE PEAK SYSTOLIC PRESSURE: 45 MMHG
RSV RNA RESP QL NAA+PROBE: NOT DETECTED
S PNEUM AG UR QL: NEGATIVE
SAO2 % BLDA: 94 % (ref 94–100)
SAO2 % BLDA: 95 % (ref 94–100)
SAO2 % BLDV: 36 % (ref 45–75)
SARS-COV-2 RNA RESP QL NAA+PROBE: NOT DETECTED
SITE OF ARTERIAL PUNCTURE: ABNORMAL
SODIUM BLDA-SCNC: 130 MMOL/L (ref 136–145)
SODIUM BLDA-SCNC: 131 MMOL/L (ref 136–145)
SODIUM BLDV-SCNC: 133 MMOL/L (ref 136–145)
SODIUM SERPL-SCNC: 134 MMOL/L (ref 136–145)
SODIUM SERPL-SCNC: 136 MMOL/L (ref 136–145)
SODIUM SERPL-SCNC: 138 MMOL/L (ref 136–145)
SODIUM SERPL-SCNC: 138 MMOL/L (ref 136–145)
SP GR UR STRIP.AUTO: 1.04
SQUAMOUS #/AREA URNS AUTO: ABNORMAL /HPF
T AXIS: 162 DEGREES
T AXIS: 53 DEGREES
T OFFSET: 400 MS
T OFFSET: 416 MS
TIDAL VOLUME: 400 ML
TRICUSPID ANNULAR PLANE SYSTOLIC EXCURSION: 2.3 CM
UROBILINOGEN UR STRIP.AUTO-MCNC: NORMAL MG/DL
VENTILATOR MODE: ABNORMAL
VENTRICULAR RATE: 81 BPM
VENTRICULAR RATE: 90 BPM
WBC # BLD AUTO: 17 X10*3/UL (ref 4.4–11.3)
WBC # BLD AUTO: 17.9 X10*3/UL (ref 4.4–11.3)
WBC # BLD AUTO: 17.9 X10*3/UL (ref 4.4–11.3)
WBC # BLD AUTO: 6.9 X10*3/UL (ref 4.4–11.3)
WBC #/AREA URNS AUTO: ABNORMAL /HPF

## 2025-01-01 PROCEDURE — 99291 CRITICAL CARE FIRST HOUR: CPT | Performed by: EMERGENCY MEDICINE

## 2025-01-01 PROCEDURE — 83735 ASSAY OF MAGNESIUM: CPT

## 2025-01-01 PROCEDURE — 2500000005 HC RX 250 GENERAL PHARMACY W/O HCPCS

## 2025-01-01 PROCEDURE — 3700000001 HC GENERAL ANESTHESIA TIME - INITIAL BASE CHARGE

## 2025-01-01 PROCEDURE — 83735 ASSAY OF MAGNESIUM: CPT | Performed by: NURSE PRACTITIONER

## 2025-01-01 PROCEDURE — B24CZZ4 ULTRASONOGRAPHY OF PERICARDIUM, TRANSESOPHAGEAL: ICD-10-PCS | Performed by: INTERNAL MEDICINE

## 2025-01-01 PROCEDURE — 2720000007 HC OR 272 NO HCPCS: Performed by: INTERNAL MEDICINE

## 2025-01-01 PROCEDURE — 93306 TTE W/DOPPLER COMPLETE: CPT | Performed by: INTERNAL MEDICINE

## 2025-01-01 PROCEDURE — 87637 SARSCOV2&INF A&B&RSV AMP PRB: CPT | Performed by: NURSE PRACTITIONER

## 2025-01-01 PROCEDURE — 85025 COMPLETE CBC W/AUTO DIFF WBC: CPT

## 2025-01-01 PROCEDURE — 76937 US GUIDE VASCULAR ACCESS: CPT | Performed by: INTERNAL MEDICINE

## 2025-01-01 PROCEDURE — 94002 VENT MGMT INPAT INIT DAY: CPT

## 2025-01-01 PROCEDURE — 2500000002 HC RX 250 W HCPCS SELF ADMINISTERED DRUGS (ALT 637 FOR MEDICARE OP, ALT 636 FOR OP/ED): Performed by: NURSE PRACTITIONER

## 2025-01-01 PROCEDURE — 5A1935Z RESPIRATORY VENTILATION, LESS THAN 24 CONSECUTIVE HOURS: ICD-10-PCS

## 2025-01-01 PROCEDURE — 31500 INSERT EMERGENCY AIRWAY: CPT | Mod: GC

## 2025-01-01 PROCEDURE — C1769 GUIDE WIRE: HCPCS | Performed by: INTERNAL MEDICINE

## 2025-01-01 PROCEDURE — 84100 ASSAY OF PHOSPHORUS: CPT

## 2025-01-01 PROCEDURE — 93458 L HRT ARTERY/VENTRICLE ANGIO: CPT | Performed by: INTERNAL MEDICINE

## 2025-01-01 PROCEDURE — 93005 ELECTROCARDIOGRAM TRACING: CPT

## 2025-01-01 PROCEDURE — 36415 COLL VENOUS BLD VENIPUNCTURE: CPT

## 2025-01-01 PROCEDURE — 2500000005 HC RX 250 GENERAL PHARMACY W/O HCPCS: Performed by: NURSE ANESTHETIST, CERTIFIED REGISTERED

## 2025-01-01 PROCEDURE — 1100000001 HC PRIVATE ROOM DAILY

## 2025-01-01 PROCEDURE — 4A023N7 MEASUREMENT OF CARDIAC SAMPLING AND PRESSURE, LEFT HEART, PERCUTANEOUS APPROACH: ICD-10-PCS | Performed by: INTERNAL MEDICINE

## 2025-01-01 PROCEDURE — 2500000001 HC RX 250 WO HCPCS SELF ADMINISTERED DRUGS (ALT 637 FOR MEDICARE OP): Performed by: NURSE PRACTITIONER

## 2025-01-01 PROCEDURE — 71045 X-RAY EXAM CHEST 1 VIEW: CPT | Performed by: RADIOLOGY

## 2025-01-01 PROCEDURE — 3700000002 HC GENERAL ANESTHESIA TIME - EACH INCREMENTAL 1 MINUTE

## 2025-01-01 PROCEDURE — C1894 INTRO/SHEATH, NON-LASER: HCPCS | Performed by: INTERNAL MEDICINE

## 2025-01-01 PROCEDURE — 37799 UNLISTED PX VASCULAR SURGERY: CPT | Performed by: NURSE PRACTITIONER

## 2025-01-01 PROCEDURE — 36415 COLL VENOUS BLD VENIPUNCTURE: CPT | Performed by: NURSE PRACTITIONER

## 2025-01-01 PROCEDURE — 0BH17EZ INSERTION OF ENDOTRACHEAL AIRWAY INTO TRACHEA, VIA NATURAL OR ARTIFICIAL OPENING: ICD-10-PCS

## 2025-01-01 PROCEDURE — 85018 HEMOGLOBIN: CPT

## 2025-01-01 PROCEDURE — 99291 CRITICAL CARE FIRST HOUR: CPT | Performed by: NURSE PRACTITIONER

## 2025-01-01 PROCEDURE — 84132 ASSAY OF SERUM POTASSIUM: CPT

## 2025-01-01 PROCEDURE — 2500000004 HC RX 250 GENERAL PHARMACY W/ HCPCS (ALT 636 FOR OP/ED): Performed by: NURSE PRACTITIONER

## 2025-01-01 PROCEDURE — 84145 PROCALCITONIN (PCT): CPT | Mod: STJLAB | Performed by: NURSE PRACTITIONER

## 2025-01-01 PROCEDURE — 94799 UNLISTED PULMONARY SVC/PX: CPT

## 2025-01-01 PROCEDURE — 2500000004 HC RX 250 GENERAL PHARMACY W/ HCPCS (ALT 636 FOR OP/ED)

## 2025-01-01 PROCEDURE — 71045 X-RAY EXAM CHEST 1 VIEW: CPT

## 2025-01-01 PROCEDURE — 84484 ASSAY OF TROPONIN QUANT: CPT

## 2025-01-01 PROCEDURE — 3E033XZ INTRODUCTION OF VASOPRESSOR INTO PERIPHERAL VEIN, PERCUTANEOUS APPROACH: ICD-10-PCS | Performed by: INTERNAL MEDICINE

## 2025-01-01 PROCEDURE — 99292 CRITICAL CARE ADDL 30 MIN: CPT | Performed by: NURSE PRACTITIONER

## 2025-01-01 PROCEDURE — 99292 CRITICAL CARE ADDL 30 MIN: CPT | Performed by: INTERNAL MEDICINE

## 2025-01-01 PROCEDURE — 2500000005 HC RX 250 GENERAL PHARMACY W/O HCPCS: Performed by: STUDENT IN AN ORGANIZED HEALTH CARE EDUCATION/TRAINING PROGRAM

## 2025-01-01 PROCEDURE — C1887 CATHETER, GUIDING: HCPCS | Performed by: INTERNAL MEDICINE

## 2025-01-01 PROCEDURE — 2550000001 HC RX 255 CONTRASTS: Performed by: INTERNAL MEDICINE

## 2025-01-01 PROCEDURE — 85027 COMPLETE CBC AUTOMATED: CPT | Performed by: NURSE PRACTITIONER

## 2025-01-01 PROCEDURE — 2020000001 HC ICU ROOM DAILY

## 2025-01-01 PROCEDURE — 2500000001 HC RX 250 WO HCPCS SELF ADMINISTERED DRUGS (ALT 637 FOR MEDICARE OP)

## 2025-01-01 PROCEDURE — 99239 HOSP IP/OBS DSCHRG MGMT >30: CPT | Performed by: INTERNAL MEDICINE

## 2025-01-01 PROCEDURE — 2500000005 HC RX 250 GENERAL PHARMACY W/O HCPCS: Performed by: NURSE PRACTITIONER

## 2025-01-01 PROCEDURE — 99222 1ST HOSP IP/OBS MODERATE 55: CPT

## 2025-01-01 PROCEDURE — 2500000004 HC RX 250 GENERAL PHARMACY W/ HCPCS (ALT 636 FOR OP/ED): Performed by: INTERNAL MEDICINE

## 2025-01-01 PROCEDURE — 2500000005 HC RX 250 GENERAL PHARMACY W/O HCPCS: Performed by: INTERNAL MEDICINE

## 2025-01-01 PROCEDURE — 87449 NOS EACH ORGANISM AG IA: CPT | Mod: STJLAB | Performed by: NURSE PRACTITIONER

## 2025-01-01 PROCEDURE — 83605 ASSAY OF LACTIC ACID: CPT | Performed by: NURSE PRACTITIONER

## 2025-01-01 PROCEDURE — 87899 AGENT NOS ASSAY W/OPTIC: CPT | Mod: STJLAB | Performed by: NURSE PRACTITIONER

## 2025-01-01 PROCEDURE — 82947 ASSAY GLUCOSE BLOOD QUANT: CPT

## 2025-01-01 PROCEDURE — 84484 ASSAY OF TROPONIN QUANT: CPT | Performed by: NURSE PRACTITIONER

## 2025-01-01 PROCEDURE — 2500000002 HC RX 250 W HCPCS SELF ADMINISTERED DRUGS (ALT 637 FOR MEDICARE OP, ALT 636 FOR OP/ED)

## 2025-01-01 PROCEDURE — 80076 HEPATIC FUNCTION PANEL: CPT | Mod: CCI | Performed by: NURSE PRACTITIONER

## 2025-01-01 PROCEDURE — B24BZZ4 ULTRASONOGRAPHY OF HEART WITH AORTA, TRANSESOPHAGEAL: ICD-10-PCS | Performed by: INTERNAL MEDICINE

## 2025-01-01 PROCEDURE — 99291 CRITICAL CARE FIRST HOUR: CPT | Performed by: INTERNAL MEDICINE

## 2025-01-01 PROCEDURE — 94003 VENT MGMT INPAT SUBQ DAY: CPT

## 2025-01-01 PROCEDURE — B2111ZZ FLUOROSCOPY OF MULTIPLE CORONARY ARTERIES USING LOW OSMOLAR CONTRAST: ICD-10-PCS | Performed by: INTERNAL MEDICINE

## 2025-01-01 PROCEDURE — 99291 CRITICAL CARE FIRST HOUR: CPT | Mod: 25 | Performed by: EMERGENCY MEDICINE

## 2025-01-01 PROCEDURE — 80053 COMPREHEN METABOLIC PANEL: CPT

## 2025-01-01 PROCEDURE — 2500000004 HC RX 250 GENERAL PHARMACY W/ HCPCS (ALT 636 FOR OP/ED): Mod: JW

## 2025-01-01 PROCEDURE — 99153 MOD SED SAME PHYS/QHP EA: CPT | Performed by: INTERNAL MEDICINE

## 2025-01-01 PROCEDURE — C8929 TTE W OR WO FOL WCON,DOPPLER: HCPCS

## 2025-01-01 PROCEDURE — 99231 SBSQ HOSP IP/OBS SF/LOW 25: CPT | Performed by: NURSE PRACTITIONER

## 2025-01-01 PROCEDURE — 2500000004 HC RX 250 GENERAL PHARMACY W/ HCPCS (ALT 636 FOR OP/ED): Mod: JW | Performed by: STUDENT IN AN ORGANIZED HEALTH CARE EDUCATION/TRAINING PROGRAM

## 2025-01-01 PROCEDURE — 2500000004 HC RX 250 GENERAL PHARMACY W/ HCPCS (ALT 636 FOR OP/ED): Performed by: NURSE ANESTHETIST, CERTIFIED REGISTERED

## 2025-01-01 PROCEDURE — 87040 BLOOD CULTURE FOR BACTERIA: CPT | Mod: STJLAB | Performed by: NURSE PRACTITIONER

## 2025-01-01 PROCEDURE — 81001 URINALYSIS AUTO W/SCOPE: CPT | Performed by: NURSE PRACTITIONER

## 2025-01-01 PROCEDURE — 74018 RADEX ABDOMEN 1 VIEW: CPT

## 2025-01-01 PROCEDURE — 96367 TX/PROPH/DG ADDL SEQ IV INF: CPT | Performed by: INTERNAL MEDICINE

## 2025-01-01 PROCEDURE — 99152 MOD SED SAME PHYS/QHP 5/>YRS: CPT | Performed by: INTERNAL MEDICINE

## 2025-01-01 PROCEDURE — 99223 1ST HOSP IP/OBS HIGH 75: CPT | Performed by: INTERNAL MEDICINE

## 2025-01-01 PROCEDURE — C1751 CATH, INF, PER/CENT/MIDLINE: HCPCS | Performed by: INTERNAL MEDICINE

## 2025-01-01 PROCEDURE — 87086 URINE CULTURE/COLONY COUNT: CPT | Mod: STJLAB | Performed by: NURSE PRACTITIONER

## 2025-01-01 PROCEDURE — 74018 RADEX ABDOMEN 1 VIEW: CPT | Performed by: RADIOLOGY

## 2025-01-01 PROCEDURE — 2500000004 HC RX 250 GENERAL PHARMACY W/ HCPCS (ALT 636 FOR OP/ED): Mod: JZ

## 2025-01-01 PROCEDURE — 83880 ASSAY OF NATRIURETIC PEPTIDE: CPT

## 2025-01-01 RX ORDER — DOCUSATE SODIUM 100 MG/1
100 CAPSULE, LIQUID FILLED ORAL 2 TIMES DAILY
Status: DISCONTINUED | OUTPATIENT
Start: 2025-01-01 | End: 2025-01-01

## 2025-01-01 RX ORDER — CHOLECALCIFEROL (VITAMIN D3) 25 MCG
25 TABLET ORAL DAILY
Status: DISCONTINUED | OUTPATIENT
Start: 2025-01-01 | End: 2025-01-01

## 2025-01-01 RX ORDER — FENTANYL CITRATE-0.9 % NACL/PF 10 MCG/ML
0-200 PLASTIC BAG, INJECTION (ML) INTRAVENOUS CONTINUOUS
Status: DISCONTINUED | OUTPATIENT
Start: 2025-01-01 | End: 2025-01-01

## 2025-01-01 RX ORDER — GLYCOPYRROLATE 0.2 MG/ML
0.2 INJECTION INTRAMUSCULAR; INTRAVENOUS EVERY 4 HOURS PRN
Status: DISCONTINUED | OUTPATIENT
Start: 2025-01-01 | End: 2025-01-01

## 2025-01-01 RX ORDER — LIDOCAINE 560 MG/1
1 PATCH PERCUTANEOUS; TOPICAL; TRANSDERMAL DAILY
Status: DISCONTINUED | OUTPATIENT
Start: 2025-01-01 | End: 2025-01-01 | Stop reason: HOSPADM

## 2025-01-01 RX ORDER — FENTANYL CITRATE 50 UG/ML
50 INJECTION, SOLUTION INTRAMUSCULAR; INTRAVENOUS ONCE
Status: COMPLETED | OUTPATIENT
Start: 2025-01-01 | End: 2025-01-01

## 2025-01-01 RX ORDER — SUCCINYLCHOLINE CHLORIDE 100 MG/5ML
SYRINGE (ML) INTRAVENOUS AS NEEDED
Status: DISCONTINUED | OUTPATIENT
Start: 2025-01-01 | End: 2025-01-01

## 2025-01-01 RX ORDER — POTASSIUM CHLORIDE 14.9 MG/ML
20 INJECTION INTRAVENOUS ONCE
Status: COMPLETED | OUTPATIENT
Start: 2025-01-01 | End: 2025-01-01

## 2025-01-01 RX ORDER — EPINEPHRINE IN 0.9 % SOD CHLOR 4MG/250ML
0-1 PLASTIC BAG, INJECTION (ML) INTRAVENOUS CONTINUOUS
Status: DISCONTINUED | OUTPATIENT
Start: 2025-01-01 | End: 2025-01-01

## 2025-01-01 RX ORDER — LIDOCAINE HYDROCHLORIDE 20 MG/ML
INJECTION, SOLUTION INFILTRATION; PERINEURAL AS NEEDED
Status: DISCONTINUED | OUTPATIENT
Start: 2025-01-01 | End: 2025-01-01 | Stop reason: HOSPADM

## 2025-01-01 RX ORDER — MIRTAZAPINE 15 MG/1
7.5 TABLET, FILM COATED ORAL NIGHTLY
Status: CANCELLED | OUTPATIENT
Start: 2025-01-01

## 2025-01-01 RX ORDER — EZETIMIBE 10 MG/1
10 TABLET ORAL NIGHTLY
Status: DISCONTINUED | OUTPATIENT
Start: 2025-01-01 | End: 2025-01-01

## 2025-01-01 RX ORDER — FENTANYL CITRATE-0.9 % NACL/PF 10 MCG/ML
PLASTIC BAG, INJECTION (ML) INTRAVENOUS
Status: DISPENSED
Start: 2025-01-01 | End: 2025-01-01

## 2025-01-01 RX ORDER — DEXMEDETOMIDINE HYDROCHLORIDE 4 UG/ML
INJECTION, SOLUTION INTRAVENOUS
Status: COMPLETED
Start: 2025-01-01 | End: 2025-01-01

## 2025-01-01 RX ORDER — FENTANYL CITRATE-0.9 % NACL/PF 10 MCG/ML
25-200 PLASTIC BAG, INJECTION (ML) INTRAVENOUS CONTINUOUS
Status: DISCONTINUED | OUTPATIENT
Start: 2025-01-01 | End: 2025-01-01

## 2025-01-01 RX ORDER — PANTOPRAZOLE SODIUM 40 MG/10ML
40 INJECTION, POWDER, LYOPHILIZED, FOR SOLUTION INTRAVENOUS
Status: DISCONTINUED | OUTPATIENT
Start: 2025-01-01 | End: 2025-01-01 | Stop reason: HOSPADM

## 2025-01-01 RX ORDER — SUCCINYLCHOLINE CHLORIDE 20 MG/ML
INJECTION INTRAMUSCULAR; INTRAVENOUS AS NEEDED
Status: DISCONTINUED | OUTPATIENT
Start: 2025-01-01 | End: 2025-01-01 | Stop reason: HOSPADM

## 2025-01-01 RX ORDER — BUSPIRONE HYDROCHLORIDE 5 MG/1
5 TABLET ORAL 2 TIMES DAILY
Status: CANCELLED | OUTPATIENT
Start: 2025-01-01

## 2025-01-01 RX ORDER — MIDAZOLAM HYDROCHLORIDE 1 MG/ML
1 INJECTION, SOLUTION INTRAMUSCULAR; INTRAVENOUS EVERY 4 HOURS PRN
Status: DISCONTINUED | OUTPATIENT
Start: 2025-01-01 | End: 2025-01-01

## 2025-01-01 RX ORDER — NOREPINEPHRINE BITARTRATE/D5W 8 MG/250ML
0-.5 PLASTIC BAG, INJECTION (ML) INTRAVENOUS CONTINUOUS
Status: DISCONTINUED | OUTPATIENT
Start: 2025-01-01 | End: 2025-01-01

## 2025-01-01 RX ORDER — DOCUSATE SODIUM 50 MG/5ML
100 LIQUID ORAL 2 TIMES DAILY
Status: DISCONTINUED | OUTPATIENT
Start: 2025-01-01 | End: 2025-01-01

## 2025-01-01 RX ORDER — INSULIN LISPRO 100 [IU]/ML
0-5 INJECTION, SOLUTION INTRAVENOUS; SUBCUTANEOUS EVERY 4 HOURS
Status: DISCONTINUED | OUTPATIENT
Start: 2025-01-01 | End: 2025-01-01

## 2025-01-01 RX ORDER — HEPARIN SODIUM 5000 [USP'U]/ML
5000 INJECTION, SOLUTION INTRAVENOUS; SUBCUTANEOUS EVERY 8 HOURS
Status: DISCONTINUED | OUTPATIENT
Start: 2025-01-01 | End: 2025-01-01

## 2025-01-01 RX ORDER — ATORVASTATIN CALCIUM 20 MG/1
20 TABLET, FILM COATED ORAL NIGHTLY
Status: DISCONTINUED | OUTPATIENT
Start: 2025-01-01 | End: 2025-01-01

## 2025-01-01 RX ORDER — DIAZEPAM 5 MG/ML
2 INJECTION, SOLUTION INTRAMUSCULAR; INTRAVENOUS EVERY 4 HOURS PRN
Status: DISCONTINUED | OUTPATIENT
Start: 2025-01-01 | End: 2025-01-01

## 2025-01-01 RX ORDER — PROPOFOL 10 MG/ML
0-20 INJECTION, EMULSION INTRAVENOUS CONTINUOUS
Status: DISCONTINUED | OUTPATIENT
Start: 2025-01-01 | End: 2025-01-01

## 2025-01-01 RX ORDER — NAPROXEN SODIUM 220 MG/1
324 TABLET, FILM COATED ORAL ONCE
Status: DISCONTINUED | OUTPATIENT
Start: 2025-01-01 | End: 2025-01-01

## 2025-01-01 RX ORDER — LATANOPROST 50 UG/ML
1 SOLUTION/ DROPS OPHTHALMIC NIGHTLY
Status: DISCONTINUED | OUTPATIENT
Start: 2025-01-01 | End: 2025-01-01 | Stop reason: HOSPADM

## 2025-01-01 RX ORDER — METOPROLOL TARTRATE 1 MG/ML
INJECTION, SOLUTION INTRAVENOUS
Status: COMPLETED
Start: 2025-01-01 | End: 2025-01-01

## 2025-01-01 RX ORDER — FUROSEMIDE 10 MG/ML
INJECTION INTRAMUSCULAR; INTRAVENOUS AS NEEDED
Status: DISCONTINUED | OUTPATIENT
Start: 2025-01-01 | End: 2025-01-01 | Stop reason: HOSPADM

## 2025-01-01 RX ORDER — HEPARIN SODIUM 5000 [USP'U]/ML
5000 INJECTION, SOLUTION INTRAVENOUS; SUBCUTANEOUS EVERY 8 HOURS SCHEDULED
Status: DISCONTINUED | OUTPATIENT
Start: 2025-01-01 | End: 2025-01-01

## 2025-01-01 RX ORDER — PANTOPRAZOLE SODIUM 40 MG/1
40 TABLET, DELAYED RELEASE ORAL
Status: DISCONTINUED | OUTPATIENT
Start: 2025-01-01 | End: 2025-01-01 | Stop reason: HOSPADM

## 2025-01-01 RX ORDER — MORPHINE SULFATE 2 MG/ML
2 INJECTION, SOLUTION INTRAMUSCULAR; INTRAVENOUS
Status: DISCONTINUED | OUTPATIENT
Start: 2025-01-01 | End: 2025-01-01

## 2025-01-01 RX ORDER — ALPRAZOLAM 0.5 MG/1
0.25 TABLET ORAL 2 TIMES DAILY PRN
Status: DISCONTINUED | OUTPATIENT
Start: 2025-01-01 | End: 2025-01-01 | Stop reason: HOSPADM

## 2025-01-01 RX ORDER — ETOMIDATE 2 MG/ML
INJECTION INTRAVENOUS CODE/TRAUMA/SEDATION MEDICATION
Status: COMPLETED | OUTPATIENT
Start: 2025-01-01 | End: 2025-01-01

## 2025-01-01 RX ORDER — DEXMEDETOMIDINE HYDROCHLORIDE 4 UG/ML
.1-1.5 INJECTION, SOLUTION INTRAVENOUS CONTINUOUS
Status: DISCONTINUED | OUTPATIENT
Start: 2025-01-01 | End: 2025-01-01

## 2025-01-01 RX ORDER — PROPOFOL 10 MG/ML
INJECTION, EMULSION INTRAVENOUS
Status: COMPLETED
Start: 2025-01-01 | End: 2025-01-01

## 2025-01-01 RX ORDER — MIRTAZAPINE 15 MG/1
7.5 TABLET, FILM COATED ORAL NIGHTLY
Status: DISCONTINUED | OUTPATIENT
Start: 2025-01-01 | End: 2025-01-01 | Stop reason: HOSPADM

## 2025-01-01 RX ORDER — NOREPINEPHRINE BITARTRATE/D5W 8 MG/250ML
PLASTIC BAG, INJECTION (ML) INTRAVENOUS
Status: DISPENSED
Start: 2025-01-01 | End: 2025-01-01

## 2025-01-01 RX ORDER — POLYETHYLENE GLYCOL 3350 17 G/17G
17 POWDER, FOR SOLUTION ORAL DAILY
Status: DISCONTINUED | OUTPATIENT
Start: 2025-01-01 | End: 2025-01-01 | Stop reason: HOSPADM

## 2025-01-01 RX ORDER — BUSPIRONE HYDROCHLORIDE 5 MG/1
5 TABLET ORAL 2 TIMES DAILY
Status: DISCONTINUED | OUTPATIENT
Start: 2025-01-01 | End: 2025-01-01 | Stop reason: HOSPADM

## 2025-01-01 RX ORDER — ACETAMINOPHEN 325 MG/1
650 TABLET ORAL EVERY 6 HOURS PRN
Status: DISCONTINUED | OUTPATIENT
Start: 2025-01-01 | End: 2025-01-01 | Stop reason: HOSPADM

## 2025-01-01 RX ORDER — MORPHINE SULFATE 4 MG/ML
4 INJECTION, SOLUTION INTRAMUSCULAR; INTRAVENOUS
Status: DISCONTINUED | OUTPATIENT
Start: 2025-01-01 | End: 2025-01-01

## 2025-01-01 RX ORDER — MIDAZOLAM HYDROCHLORIDE 1 MG/ML
INJECTION, SOLUTION INTRAMUSCULAR; INTRAVENOUS AS NEEDED
Status: DISCONTINUED | OUTPATIENT
Start: 2025-01-01 | End: 2025-01-01 | Stop reason: HOSPADM

## 2025-01-01 RX ORDER — ETOMIDATE 2 MG/ML
INJECTION INTRAVENOUS AS NEEDED
Status: DISCONTINUED | OUTPATIENT
Start: 2025-01-01 | End: 2025-01-01 | Stop reason: HOSPADM

## 2025-01-01 RX ORDER — ASPIRIN 81 MG/1
81 TABLET ORAL DAILY
Status: DISCONTINUED | OUTPATIENT
Start: 2025-01-01 | End: 2025-01-01

## 2025-01-01 RX ORDER — PROPOFOL 10 MG/ML
INJECTION, EMULSION INTRAVENOUS AS NEEDED
Status: DISCONTINUED | OUTPATIENT
Start: 2025-01-01 | End: 2025-01-01

## 2025-01-01 RX ORDER — HYDROMORPHONE HYDROCHLORIDE 0.2 MG/ML
0.2 INJECTION INTRAMUSCULAR; INTRAVENOUS; SUBCUTANEOUS
Status: DISCONTINUED | OUTPATIENT
Start: 2025-01-01 | End: 2025-01-01

## 2025-01-01 RX ORDER — SODIUM CHLORIDE, SODIUM LACTATE, POTASSIUM CHLORIDE, CALCIUM CHLORIDE 600; 310; 30; 20 MG/100ML; MG/100ML; MG/100ML; MG/100ML
50 INJECTION, SOLUTION INTRAVENOUS CONTINUOUS
Status: DISCONTINUED | OUTPATIENT
Start: 2025-01-01 | End: 2025-01-01

## 2025-01-01 RX ORDER — MIRTAZAPINE 15 MG/1
7.5 TABLET, FILM COATED ORAL NIGHTLY
Status: DISCONTINUED | OUTPATIENT
Start: 2025-01-01 | End: 2025-01-01

## 2025-01-01 RX ORDER — PHENYLEPHRINE HCL IN 0.9% NACL 1 MG/10 ML
SYRINGE (ML) INTRAVENOUS AS NEEDED
Status: DISCONTINUED | OUTPATIENT
Start: 2025-01-01 | End: 2025-01-01 | Stop reason: HOSPADM

## 2025-01-01 RX ORDER — PROPOFOL 10 MG/ML
INJECTION, EMULSION INTRAVENOUS CODE/TRAUMA/SEDATION MEDICATION
Status: COMPLETED | OUTPATIENT
Start: 2025-01-01 | End: 2025-01-01

## 2025-01-01 RX ORDER — HYDROMORPHONE HYDROCHLORIDE 1 MG/ML
0.6 INJECTION, SOLUTION INTRAMUSCULAR; INTRAVENOUS; SUBCUTANEOUS
Status: DISCONTINUED | OUTPATIENT
Start: 2025-01-01 | End: 2025-01-01 | Stop reason: HOSPADM

## 2025-01-01 RX ORDER — METOPROLOL TARTRATE 1 MG/ML
5 INJECTION, SOLUTION INTRAVENOUS ONCE
Status: COMPLETED | OUTPATIENT
Start: 2025-01-01 | End: 2025-01-01

## 2025-01-01 RX ORDER — BUSPIRONE HYDROCHLORIDE 5 MG/1
5 TABLET ORAL 2 TIMES DAILY
Status: DISCONTINUED | OUTPATIENT
Start: 2025-01-01 | End: 2025-01-01

## 2025-01-01 RX ORDER — LORAZEPAM 2 MG/ML
0.5 INJECTION INTRAMUSCULAR EVERY 4 HOURS PRN
Status: DISCONTINUED | OUTPATIENT
Start: 2025-01-01 | End: 2025-01-01

## 2025-01-01 RX ORDER — MIDAZOLAM HYDROCHLORIDE 1 MG/ML
2 INJECTION, SOLUTION INTRAMUSCULAR; INTRAVENOUS AS NEEDED
Status: DISCONTINUED | OUTPATIENT
Start: 2025-01-01 | End: 2025-01-01

## 2025-01-01 RX ORDER — TICAGRELOR 90 MG/1
180 TABLET, FILM COATED ORAL ONCE
Status: COMPLETED | OUTPATIENT
Start: 2025-01-01 | End: 2025-01-01

## 2025-01-01 RX ORDER — FENTANYL CITRATE 50 UG/ML
INJECTION, SOLUTION INTRAMUSCULAR; INTRAVENOUS AS NEEDED
Status: DISCONTINUED | OUTPATIENT
Start: 2025-01-01 | End: 2025-01-01 | Stop reason: HOSPADM

## 2025-01-01 RX ORDER — CEFTRIAXONE 1 G/50ML
1 INJECTION, SOLUTION INTRAVENOUS EVERY 24 HOURS
Status: DISCONTINUED | OUTPATIENT
Start: 2025-01-01 | End: 2025-01-01

## 2025-01-01 RX ORDER — HEPARIN SODIUM 5000 [USP'U]/ML
3300 INJECTION, SOLUTION INTRAVENOUS; SUBCUTANEOUS ONCE
Status: COMPLETED | OUTPATIENT
Start: 2025-01-01 | End: 2025-01-01

## 2025-01-01 RX ORDER — DOCUSATE SODIUM 50 MG/5ML
100 LIQUID ORAL 2 TIMES DAILY
Status: CANCELLED | OUTPATIENT
Start: 2025-01-01

## 2025-01-01 RX ADMIN — HYDROMORPHONE HYDROCHLORIDE 0.2 MG: 0.2 INJECTION, SOLUTION INTRAMUSCULAR; INTRAVENOUS; SUBCUTANEOUS at 06:47

## 2025-01-01 RX ADMIN — AZITHROMYCIN MONOHYDRATE 500 MG: 500 INJECTION, POWDER, LYOPHILIZED, FOR SOLUTION INTRAVENOUS at 17:06

## 2025-01-01 RX ADMIN — HYDROMORPHONE HYDROCHLORIDE 0.6 MG: 1 INJECTION, SOLUTION INTRAMUSCULAR; INTRAVENOUS; SUBCUTANEOUS at 03:39

## 2025-01-01 RX ADMIN — Medication 50 PERCENT: at 00:20

## 2025-01-01 RX ADMIN — ETOMIDATE 4 MG: 20 INJECTION, SOLUTION INTRAVENOUS at 15:19

## 2025-01-01 RX ADMIN — PROPOFOL 5 MCG/KG/MIN: 10 INJECTION, EMULSION INTRAVENOUS at 16:00

## 2025-01-01 RX ADMIN — HYDROMORPHONE HYDROCHLORIDE 0.6 MG: 1 INJECTION, SOLUTION INTRAMUSCULAR; INTRAVENOUS; SUBCUTANEOUS at 19:50

## 2025-01-01 RX ADMIN — ACETAMINOPHEN 650 MG: 325 TABLET ORAL at 21:37

## 2025-01-01 RX ADMIN — HYDROMORPHONE HYDROCHLORIDE 0.2 MG: 0.2 INJECTION, SOLUTION INTRAMUSCULAR; INTRAVENOUS; SUBCUTANEOUS at 18:30

## 2025-01-01 RX ADMIN — ETOMIDATE 4 MG: 20 INJECTION, SOLUTION INTRAVENOUS at 15:25

## 2025-01-01 RX ADMIN — Medication 0.01 MCG/KG/MIN: at 15:50

## 2025-01-01 RX ADMIN — CEFTRIAXONE 1 G: 1 INJECTION, SOLUTION INTRAVENOUS at 17:07

## 2025-01-01 RX ADMIN — HYDROMORPHONE HYDROCHLORIDE 0.2 MG: 0.2 INJECTION, SOLUTION INTRAMUSCULAR; INTRAVENOUS; SUBCUTANEOUS at 09:22

## 2025-01-01 RX ADMIN — EZETIMIBE 10 MG: 10 TABLET ORAL at 21:08

## 2025-01-01 RX ADMIN — TICAGRELOR 180 MG: 90 TABLET ORAL at 17:38

## 2025-01-01 RX ADMIN — HYDROMORPHONE HYDROCHLORIDE 0.2 MG: 0.2 INJECTION, SOLUTION INTRAMUSCULAR; INTRAVENOUS; SUBCUTANEOUS at 17:23

## 2025-01-01 RX ADMIN — Medication 100 PERCENT: at 18:19

## 2025-01-01 RX ADMIN — HYDROMORPHONE HYDROCHLORIDE 0.2 MG: 0.2 INJECTION, SOLUTION INTRAMUSCULAR; INTRAVENOUS; SUBCUTANEOUS at 19:14

## 2025-01-01 RX ADMIN — Medication 0.05 MCG/KG/MIN: at 19:14

## 2025-01-01 RX ADMIN — LATANOPROST 1 DROP: 50 SOLUTION OPHTHALMIC at 21:23

## 2025-01-01 RX ADMIN — PROPOFOL 30 MG: 10 INJECTION, EMULSION INTRAVENOUS at 15:46

## 2025-01-01 RX ADMIN — Medication 100 PERCENT: at 15:33

## 2025-01-01 RX ADMIN — HEPARIN SODIUM 5000 UNITS: 5000 INJECTION, SOLUTION INTRAVENOUS; SUBCUTANEOUS at 21:53

## 2025-01-01 RX ADMIN — Medication 15 L/MIN: at 09:04

## 2025-01-01 RX ADMIN — Medication 100 PERCENT: at 14:33

## 2025-01-01 RX ADMIN — POTASSIUM CHLORIDE 20 MEQ: 14.9 INJECTION, SOLUTION INTRAVENOUS at 06:18

## 2025-01-01 RX ADMIN — PROPOFOL 30 MG: 10 INJECTION, EMULSION INTRAVENOUS at 15:42

## 2025-01-01 RX ADMIN — PROPOFOL 30 MG: 10 INJECTION, EMULSION INTRAVENOUS at 15:37

## 2025-01-01 RX ADMIN — ATORVASTATIN CALCIUM 20 MG: 20 TABLET, FILM COATED ORAL at 21:08

## 2025-01-01 RX ADMIN — Medication 50 MCG/HR: at 19:45

## 2025-01-01 RX ADMIN — Medication 15 L/MIN: at 21:30

## 2025-01-01 RX ADMIN — PERFLUTREN 3 ML OF DILUTION: 6.52 INJECTION, SUSPENSION INTRAVENOUS at 09:01

## 2025-01-01 RX ADMIN — DOCUSATE SODIUM LIQUID 100 MG: 100 LIQUID ORAL at 21:52

## 2025-01-01 RX ADMIN — CEFTRIAXONE 1 G: 1 INJECTION, SOLUTION INTRAVENOUS at 18:01

## 2025-01-01 RX ADMIN — BUSPIRONE HYDROCHLORIDE 5 MG: 5 TABLET ORAL at 21:08

## 2025-01-01 RX ADMIN — Medication 50 PERCENT: at 20:00

## 2025-01-01 RX ADMIN — AZITHROMYCIN MONOHYDRATE 500 MG: 500 INJECTION, POWDER, LYOPHILIZED, FOR SOLUTION INTRAVENOUS at 12:37

## 2025-01-01 RX ADMIN — ALPRAZOLAM 0.25 MG: 0.5 TABLET ORAL at 04:47

## 2025-01-01 RX ADMIN — DEXMEDETOMIDINE HYDROCHLORIDE 0.2 MCG/KG/HR: 400 INJECTION INTRAVENOUS at 17:33

## 2025-01-01 RX ADMIN — HYDROMORPHONE HYDROCHLORIDE 0.6 MG: 1 INJECTION, SOLUTION INTRAMUSCULAR; INTRAVENOUS; SUBCUTANEOUS at 02:30

## 2025-01-01 RX ADMIN — PROPOFOL 30 MG: 10 INJECTION, EMULSION INTRAVENOUS at 15:33

## 2025-01-01 RX ADMIN — METOPROLOL TARTRATE 5 MG: 1 INJECTION, SOLUTION INTRAVENOUS at 08:40

## 2025-01-01 RX ADMIN — HYDROMORPHONE HYDROCHLORIDE 0.2 MG: 0.2 INJECTION, SOLUTION INTRAMUSCULAR; INTRAVENOUS; SUBCUTANEOUS at 07:43

## 2025-01-01 RX ADMIN — LORAZEPAM 0.5 MG: 2 INJECTION INTRAMUSCULAR; INTRAVENOUS at 21:53

## 2025-01-01 RX ADMIN — HYDROMORPHONE HYDROCHLORIDE 0.6 MG: 1 INJECTION, SOLUTION INTRAMUSCULAR; INTRAVENOUS; SUBCUTANEOUS at 21:28

## 2025-01-01 RX ADMIN — MIRTAZAPINE 7.5 MG: 15 TABLET, FILM COATED ORAL at 21:08

## 2025-01-01 RX ADMIN — SODIUM CHLORIDE, POTASSIUM CHLORIDE, SODIUM LACTATE AND CALCIUM CHLORIDE 500 ML: 600; 310; 30; 20 INJECTION, SOLUTION INTRAVENOUS at 13:33

## 2025-01-01 RX ADMIN — MIDAZOLAM 1 MG: 1 INJECTION INTRAMUSCULAR; INTRAVENOUS at 14:40

## 2025-01-01 RX ADMIN — GLYCOPYRROLATE 0.2 MG: 0.2 INJECTION, SOLUTION INTRAMUSCULAR; INTRAVENOUS at 21:53

## 2025-01-01 RX ADMIN — MIRTAZAPINE 7.5 MG: 15 TABLET, FILM COATED ORAL at 21:52

## 2025-01-01 RX ADMIN — BUSPIRONE HYDROCHLORIDE 5 MG: 5 TABLET ORAL at 17:04

## 2025-01-01 RX ADMIN — HEPARIN SODIUM 5000 UNITS: 5000 INJECTION, SOLUTION INTRAVENOUS; SUBCUTANEOUS at 04:06

## 2025-01-01 RX ADMIN — HEPARIN SODIUM 3300 UNITS: 5000 INJECTION, SOLUTION INTRAVENOUS; SUBCUTANEOUS at 17:25

## 2025-01-01 RX ADMIN — SODIUM CHLORIDE, POTASSIUM CHLORIDE, SODIUM LACTATE AND CALCIUM CHLORIDE 50 ML/HR: 600; 310; 30; 20 INJECTION, SOLUTION INTRAVENOUS at 16:45

## 2025-01-01 RX ADMIN — POLYETHYLENE GLYCOL 3350 17 G: 17 POWDER, FOR SOLUTION ORAL at 17:05

## 2025-01-01 RX ADMIN — MORPHINE SULFATE 4 MG: 4 INJECTION, SOLUTION INTRAMUSCULAR; INTRAVENOUS at 21:53

## 2025-01-01 RX ADMIN — BUSPIRONE HYDROCHLORIDE 5 MG: 5 TABLET ORAL at 21:52

## 2025-01-01 RX ADMIN — Medication 100 MG: at 15:33

## 2025-01-01 RX ADMIN — HYDROMORPHONE HYDROCHLORIDE 0.6 MG: 1 INJECTION, SOLUTION INTRAMUSCULAR; INTRAVENOUS; SUBCUTANEOUS at 04:51

## 2025-01-01 RX ADMIN — EZETIMIBE 10 MG: 10 TABLET ORAL at 21:52

## 2025-01-01 RX ADMIN — HYDROMORPHONE HYDROCHLORIDE 0.2 MG: 0.2 INJECTION, SOLUTION INTRAMUSCULAR; INTRAVENOUS; SUBCUTANEOUS at 11:10

## 2025-01-01 RX ADMIN — Medication 25 MCG: at 17:39

## 2025-01-01 RX ADMIN — ATORVASTATIN CALCIUM 20 MG: 20 TABLET, FILM COATED ORAL at 21:52

## 2025-01-01 RX ADMIN — EPINEPHRINE IN SODIUM CHLORIDE 0.2 MCG/KG/MIN: 16 INJECTION INTRAVENOUS at 18:42

## 2025-01-01 RX ADMIN — HYDROMORPHONE HYDROCHLORIDE 0.6 MG: 1 INJECTION, SOLUTION INTRAMUSCULAR; INTRAVENOUS; SUBCUTANEOUS at 00:46

## 2025-01-01 RX ADMIN — DEXMEDETOMIDINE HYDROCHLORIDE 0.56 MCG/KG/HR: 400 INJECTION INTRAVENOUS at 03:40

## 2025-01-01 RX ADMIN — METOPROLOL TARTRATE 5 MG: 5 INJECTION INTRAVENOUS at 08:40

## 2025-01-01 RX ADMIN — HYDROMORPHONE HYDROCHLORIDE 0.6 MG: 1 INJECTION, SOLUTION INTRAMUSCULAR; INTRAVENOUS; SUBCUTANEOUS at 06:00

## 2025-01-01 RX ADMIN — DOCUSATE SODIUM 100 MG: 100 CAPSULE, LIQUID FILLED ORAL at 17:04

## 2025-01-01 RX ADMIN — ETOMIDATE 4 MG: 20 INJECTION, SOLUTION INTRAVENOUS at 15:17

## 2025-01-01 RX ADMIN — ETOMIDATE 4 MG: 20 INJECTION, SOLUTION INTRAVENOUS at 15:22

## 2025-01-01 RX ADMIN — Medication 15 L/MIN: at 08:25

## 2025-01-01 RX ADMIN — Medication 25 MCG/HR: at 19:50

## 2025-01-01 RX ADMIN — FENTANYL CITRATE 50 MCG: 50 INJECTION, SOLUTION INTRAMUSCULAR; INTRAVENOUS at 19:52

## 2025-01-01 RX ADMIN — ASPIRIN 81 MG: 81 TABLET, COATED ORAL at 17:04

## 2025-01-01 RX ADMIN — HYDROMORPHONE HYDROCHLORIDE 0.2 MG: 0.2 INJECTION, SOLUTION INTRAMUSCULAR; INTRAVENOUS; SUBCUTANEOUS at 16:22

## 2025-01-01 RX ADMIN — LIDOCAINE 4% 1 PATCH: 40 PATCH TOPICAL at 12:39

## 2025-01-01 RX ADMIN — HYDROMORPHONE HYDROCHLORIDE 0.2 MG: 0.2 INJECTION, SOLUTION INTRAMUSCULAR; INTRAVENOUS; SUBCUTANEOUS at 17:47

## 2025-01-01 RX ADMIN — HYDROMORPHONE HYDROCHLORIDE 0.2 MG: 0.2 INJECTION, SOLUTION INTRAMUSCULAR; INTRAVENOUS; SUBCUTANEOUS at 13:10

## 2025-01-01 RX ADMIN — Medication 65 PERCENT: at 03:58

## 2025-01-01 RX ADMIN — LATANOPROST 1 DROP: 50 SOLUTION OPHTHALMIC at 21:53

## 2025-01-01 SDOH — SOCIAL STABILITY: SOCIAL INSECURITY: WITHIN THE LAST YEAR, HAVE YOU BEEN AFRAID OF YOUR PARTNER OR EX-PARTNER?: NO

## 2025-01-01 SDOH — SOCIAL STABILITY: SOCIAL INSECURITY: WITHIN THE LAST YEAR, HAVE YOU BEEN HUMILIATED OR EMOTIONALLY ABUSED IN OTHER WAYS BY YOUR PARTNER OR EX-PARTNER?: NO

## 2025-01-01 SDOH — ECONOMIC STABILITY: INCOME INSECURITY: IN THE PAST 12 MONTHS HAS THE ELECTRIC, GAS, OIL, OR WATER COMPANY THREATENED TO SHUT OFF SERVICES IN YOUR HOME?: NO

## 2025-01-01 SDOH — ECONOMIC STABILITY: FOOD INSECURITY: WITHIN THE PAST 12 MONTHS, THE FOOD YOU BOUGHT JUST DIDN'T LAST AND YOU DIDN'T HAVE MONEY TO GET MORE.: NEVER TRUE

## 2025-01-01 SDOH — SOCIAL STABILITY: SOCIAL INSECURITY
WITHIN THE LAST YEAR, HAVE YOU BEEN RAPED OR FORCED TO HAVE ANY KIND OF SEXUAL ACTIVITY BY YOUR PARTNER OR EX-PARTNER?: NO

## 2025-01-01 SDOH — ECONOMIC STABILITY: HOUSING INSECURITY: IN THE LAST 12 MONTHS, WAS THERE A TIME WHEN YOU WERE NOT ABLE TO PAY THE MORTGAGE OR RENT ON TIME?: NO

## 2025-01-01 SDOH — ECONOMIC STABILITY: HOUSING INSECURITY: IN THE PAST 12 MONTHS, HOW MANY TIMES HAVE YOU MOVED WHERE YOU WERE LIVING?: 0

## 2025-01-01 SDOH — SOCIAL STABILITY: SOCIAL INSECURITY: WERE YOU ABLE TO COMPLETE ALL THE BEHAVIORAL HEALTH SCREENINGS?: YES

## 2025-01-01 SDOH — SOCIAL STABILITY: SOCIAL INSECURITY: DO YOU FEEL UNSAFE GOING BACK TO THE PLACE WHERE YOU ARE LIVING?: NO

## 2025-01-01 SDOH — SOCIAL STABILITY: SOCIAL INSECURITY: ARE YOU OR HAVE YOU BEEN THREATENED OR ABUSED PHYSICALLY, EMOTIONALLY, OR SEXUALLY BY ANYONE?: NO

## 2025-01-01 SDOH — SOCIAL STABILITY: SOCIAL INSECURITY
WITHIN THE LAST YEAR, HAVE YOU BEEN KICKED, HIT, SLAPPED, OR OTHERWISE PHYSICALLY HURT BY YOUR PARTNER OR EX-PARTNER?: NO

## 2025-01-01 SDOH — SOCIAL STABILITY: SOCIAL INSECURITY: ARE THERE ANY APPARENT SIGNS OF INJURIES/BEHAVIORS THAT COULD BE RELATED TO ABUSE/NEGLECT?: NO

## 2025-01-01 SDOH — SOCIAL STABILITY: SOCIAL INSECURITY: HAVE YOU HAD ANY THOUGHTS OF HARMING ANYONE ELSE?: NO

## 2025-01-01 SDOH — ECONOMIC STABILITY: TRANSPORTATION INSECURITY: IN THE PAST 12 MONTHS, HAS LACK OF TRANSPORTATION KEPT YOU FROM MEDICAL APPOINTMENTS OR FROM GETTING MEDICATIONS?: NO

## 2025-01-01 SDOH — ECONOMIC STABILITY: FOOD INSECURITY: HOW HARD IS IT FOR YOU TO PAY FOR THE VERY BASICS LIKE FOOD, HOUSING, MEDICAL CARE, AND HEATING?: NOT HARD AT ALL

## 2025-01-01 SDOH — ECONOMIC STABILITY: HOUSING INSECURITY: AT ANY TIME IN THE PAST 12 MONTHS, WERE YOU HOMELESS OR LIVING IN A SHELTER (INCLUDING NOW)?: NO

## 2025-01-01 SDOH — ECONOMIC STABILITY: FOOD INSECURITY: WITHIN THE PAST 12 MONTHS, YOU WORRIED THAT YOUR FOOD WOULD RUN OUT BEFORE YOU GOT THE MONEY TO BUY MORE.: NEVER TRUE

## 2025-01-01 SDOH — SOCIAL STABILITY: SOCIAL INSECURITY: ABUSE: ADULT

## 2025-01-01 SDOH — SOCIAL STABILITY: SOCIAL INSECURITY: DOES ANYONE TRY TO KEEP YOU FROM HAVING/CONTACTING OTHER FRIENDS OR DOING THINGS OUTSIDE YOUR HOME?: NO

## 2025-01-01 SDOH — HEALTH STABILITY: MENTAL HEALTH: CURRENT SMOKER: 0

## 2025-01-01 SDOH — SOCIAL STABILITY: SOCIAL INSECURITY: HAVE YOU HAD THOUGHTS OF HARMING ANYONE ELSE?: NO

## 2025-01-01 SDOH — SOCIAL STABILITY: SOCIAL INSECURITY: HAS ANYONE EVER THREATENED TO HURT YOUR FAMILY OR YOUR PETS?: NO

## 2025-01-01 SDOH — SOCIAL STABILITY: SOCIAL INSECURITY: DO YOU FEEL ANYONE HAS EXPLOITED OR TAKEN ADVANTAGE OF YOU FINANCIALLY OR OF YOUR PERSONAL PROPERTY?: NO

## 2025-01-01 ASSESSMENT — COGNITIVE AND FUNCTIONAL STATUS - GENERAL
MOVING FROM LYING ON BACK TO SITTING ON SIDE OF FLAT BED WITH BEDRAILS: TOTAL
PERSONAL GROOMING: TOTAL
TOILETING: A LOT
PERSONAL GROOMING: TOTAL
HELP NEEDED FOR BATHING: TOTAL
CLIMB 3 TO 5 STEPS WITH RAILING: A LOT
DAILY ACTIVITIY SCORE: 12
DAILY ACTIVITIY SCORE: 6
MOBILITY SCORE: 6
HELP NEEDED FOR BATHING: A LOT
PERSONAL GROOMING: A LOT
HELP NEEDED FOR BATHING: A LOT
HELP NEEDED FOR BATHING: TOTAL
EATING MEALS: TOTAL
WALKING IN HOSPITAL ROOM: TOTAL
DAILY ACTIVITIY SCORE: 6
WALKING IN HOSPITAL ROOM: A LOT
CLIMB 3 TO 5 STEPS WITH RAILING: TOTAL
HELP NEEDED FOR BATHING: TOTAL
MOVING FROM LYING ON BACK TO SITTING ON SIDE OF FLAT BED WITH BEDRAILS: TOTAL
MOBILITY SCORE: 6
DRESSING REGULAR LOWER BODY CLOTHING: A LOT
MOVING FROM LYING ON BACK TO SITTING ON SIDE OF FLAT BED WITH BEDRAILS: A LOT
DRESSING REGULAR LOWER BODY CLOTHING: TOTAL
WALKING IN HOSPITAL ROOM: TOTAL
MOVING TO AND FROM BED TO CHAIR: TOTAL
MOBILITY SCORE: 6
WALKING IN HOSPITAL ROOM: TOTAL
MOBILITY SCORE: 12
PERSONAL GROOMING: TOTAL
TURNING FROM BACK TO SIDE WHILE IN FLAT BAD: TOTAL
DRESSING REGULAR UPPER BODY CLOTHING: TOTAL
STANDING UP FROM CHAIR USING ARMS: A LOT
DRESSING REGULAR LOWER BODY CLOTHING: A LOT
STANDING UP FROM CHAIR USING ARMS: TOTAL
DAILY ACTIVITIY SCORE: 6
MOVING TO AND FROM BED TO CHAIR: A LOT
EATING MEALS: TOTAL
TURNING FROM BACK TO SIDE WHILE IN FLAT BAD: TOTAL
DAILY ACTIVITIY SCORE: 6
HELP NEEDED FOR BATHING: TOTAL
MOBILITY SCORE: 6
TURNING FROM BACK TO SIDE WHILE IN FLAT BAD: TOTAL
DRESSING REGULAR UPPER BODY CLOTHING: TOTAL
DRESSING REGULAR UPPER BODY CLOTHING: A LOT
PERSONAL GROOMING: A LOT
WALKING IN HOSPITAL ROOM: TOTAL
DAILY ACTIVITIY SCORE: 6
WALKING IN HOSPITAL ROOM: TOTAL
TOILETING: A LOT
MOBILITY SCORE: 6
DRESSING REGULAR LOWER BODY CLOTHING: TOTAL
WALKING IN HOSPITAL ROOM: TOTAL
MOVING TO AND FROM BED TO CHAIR: TOTAL
DAILY ACTIVITIY SCORE: 6
DRESSING REGULAR UPPER BODY CLOTHING: TOTAL
WALKING IN HOSPITAL ROOM: A LOT
DRESSING REGULAR LOWER BODY CLOTHING: TOTAL
TOILETING: TOTAL
DRESSING REGULAR UPPER BODY CLOTHING: TOTAL
DRESSING REGULAR LOWER BODY CLOTHING: TOTAL
MOVING TO AND FROM BED TO CHAIR: TOTAL
PERSONAL GROOMING: TOTAL
PERSONAL GROOMING: TOTAL
MOVING FROM LYING ON BACK TO SITTING ON SIDE OF FLAT BED WITH BEDRAILS: TOTAL
TURNING FROM BACK TO SIDE WHILE IN FLAT BAD: TOTAL
DRESSING REGULAR UPPER BODY CLOTHING: TOTAL
TOILETING: TOTAL
DRESSING REGULAR LOWER BODY CLOTHING: TOTAL
EATING MEALS: TOTAL
TURNING FROM BACK TO SIDE WHILE IN FLAT BAD: A LOT
TURNING FROM BACK TO SIDE WHILE IN FLAT BAD: TOTAL
DAILY ACTIVITIY SCORE: 12
CLIMB 3 TO 5 STEPS WITH RAILING: A LOT
TOILETING: TOTAL
EATING MEALS: TOTAL
EATING MEALS: A LOT
CLIMB 3 TO 5 STEPS WITH RAILING: TOTAL
HELP NEEDED FOR BATHING: TOTAL
TURNING FROM BACK TO SIDE WHILE IN FLAT BAD: TOTAL
STANDING UP FROM CHAIR USING ARMS: TOTAL
MOVING FROM LYING ON BACK TO SITTING ON SIDE OF FLAT BED WITH BEDRAILS: TOTAL
TOILETING: TOTAL
MOBILITY SCORE: 12
PERSONAL GROOMING: TOTAL
HELP NEEDED FOR BATHING: TOTAL
CLIMB 3 TO 5 STEPS WITH RAILING: TOTAL
DRESSING REGULAR UPPER BODY CLOTHING: TOTAL
EATING MEALS: TOTAL
MOVING FROM LYING ON BACK TO SITTING ON SIDE OF FLAT BED WITH BEDRAILS: TOTAL
MOVING FROM LYING ON BACK TO SITTING ON SIDE OF FLAT BED WITH BEDRAILS: A LOT
PERSONAL GROOMING: TOTAL
STANDING UP FROM CHAIR USING ARMS: TOTAL
STANDING UP FROM CHAIR USING ARMS: TOTAL
TOILETING: TOTAL
MOVING FROM LYING ON BACK TO SITTING ON SIDE OF FLAT BED WITH BEDRAILS: TOTAL
CLIMB 3 TO 5 STEPS WITH RAILING: TOTAL
TURNING FROM BACK TO SIDE WHILE IN FLAT BAD: TOTAL
DRESSING REGULAR LOWER BODY CLOTHING: TOTAL
MOVING TO AND FROM BED TO CHAIR: TOTAL
HELP NEEDED FOR BATHING: TOTAL
MOVING TO AND FROM BED TO CHAIR: TOTAL
CLIMB 3 TO 5 STEPS WITH RAILING: TOTAL
STANDING UP FROM CHAIR USING ARMS: TOTAL
STANDING UP FROM CHAIR USING ARMS: TOTAL
TURNING FROM BACK TO SIDE WHILE IN FLAT BAD: A LOT
MOBILITY SCORE: 6
WALKING IN HOSPITAL ROOM: TOTAL
MOVING TO AND FROM BED TO CHAIR: TOTAL
CLIMB 3 TO 5 STEPS WITH RAILING: TOTAL
DAILY ACTIVITIY SCORE: 6
TOILETING: TOTAL
EATING MEALS: A LOT
STANDING UP FROM CHAIR USING ARMS: A LOT
EATING MEALS: TOTAL
MOVING TO AND FROM BED TO CHAIR: A LOT
DRESSING REGULAR UPPER BODY CLOTHING: A LOT
DRESSING REGULAR LOWER BODY CLOTHING: TOTAL
CLIMB 3 TO 5 STEPS WITH RAILING: TOTAL
PATIENT BASELINE BEDBOUND: NO
STANDING UP FROM CHAIR USING ARMS: TOTAL
MOVING TO AND FROM BED TO CHAIR: TOTAL
EATING MEALS: TOTAL
TOILETING: TOTAL
MOVING FROM LYING ON BACK TO SITTING ON SIDE OF FLAT BED WITH BEDRAILS: TOTAL
MOBILITY SCORE: 6
DRESSING REGULAR UPPER BODY CLOTHING: TOTAL

## 2025-01-01 ASSESSMENT — RESPIRATORY DISTRESS OBSERVATION SCALE (RDOS)
PARADOXICAL BREATHING PATTERN: 0 - NONE
ACCESSORY MUSCLE RISE IN CLAVICLE DURING INSPIRATION: 0 - NONE
RESTLESS NONPURPOSEFUL MOVEMENTS: 0 - NONE
HEART RATE PER MINUTE: 0 - <90 BEATS
PARADOXICAL BREATHING PATTERN: 0 - NONE
HEART RATE PER MINUTE: 2 - >109 BEATS
RESPIRATORY RATE PER MINUTE: 0 - <19 BREATHS
RDOS TOTAL SCORE: 2
RDOS TOTAL SCORE: 5
RESPIRATORY RATE PER MINUTE: 1 - 19-30 BREATHS
GRUNTING AT END OF EXPIRATION: 0 - NONE
LOOK OF FEAR: 0 - NONE
PARADOXICAL BREATHING PATTERN: 0 - NONE
GRUNTING AT END OF EXPIRATION: 0 - NONE
INVOLUNTARY NASAL FLARING: 0 - NONE
LOOK OF FEAR: 0 - NONE
PARADOXICAL BREATHING PATTERN: 0 - NONE
HEART RATE PER MINUTE: 2 - >109 BEATS
HEART RATE PER MINUTE: 2 - >109 BEATS
RESTLESS NONPURPOSEFUL MOVEMENTS: 0 - NONE
RESTLESS NONPURPOSEFUL MOVEMENTS: 0 - NONE
INVOLUNTARY NASAL FLARING: 0 - NONE
RESPIRATORY RATE PER MINUTE: 1 - 19-30 BREATHS
RESTLESS NONPURPOSEFUL MOVEMENTS: 0 - NONE
RDOS TOTAL SCORE: 5
INVOLUNTARY NASAL FLARING: 0 - NONE
HEART RATE PER MINUTE: 2 - >109 BEATS
GRUNTING AT END OF EXPIRATION: 0 - NONE
RESPIRATORY RATE PER MINUTE: 1 - 19-30 BREATHS
INVOLUNTARY NASAL FLARING: 0 - NONE
LOOK OF FEAR: 0 - NONE
INVOLUNTARY NASAL FLARING: 0 - NONE
RESTLESS NONPURPOSEFUL MOVEMENTS: 0 - NONE
HEART RATE PER MINUTE: 2 - >109 BEATS
HEART RATE PER MINUTE: 0 - <90 BEATS
GRUNTING AT END OF EXPIRATION: 0 - NONE
RDOS TOTAL SCORE: 2
RESTLESS NONPURPOSEFUL MOVEMENTS: 0 - NONE
RESPIRATORY RATE PER MINUTE: 1 - 19-30 BREATHS
RDOS TOTAL SCORE: 5
LOOK OF FEAR: 0 - NONE
PARADOXICAL BREATHING PATTERN: 0 - NONE
RESPIRATORY RATE PER MINUTE: 1 - 19-30 BREATHS
HEART RATE PER MINUTE: 2 - >109 BEATS
INVOLUNTARY NASAL FLARING: 0 - NONE
ACCESSORY MUSCLE RISE IN CLAVICLE DURING INSPIRATION: 1 - SLIGHT RISE
ACCESSORY MUSCLE RISE IN CLAVICLE DURING INSPIRATION: 0 - NONE
LOOK OF FEAR: 0 - NONE
INVOLUNTARY NASAL FLARING: 0 - NONE
LOOK OF FEAR: 0 - NONE
PARADOXICAL BREATHING PATTERN: 0 - NONE
RDOS TOTAL SCORE: 2
LOOK OF FEAR: 0 - NONE
LOOK OF FEAR: 0 - NONE
RESTLESS NONPURPOSEFUL MOVEMENTS: 0 - NONE
ACCESSORY MUSCLE RISE IN CLAVICLE DURING INSPIRATION: 0 - NONE
RDOS TOTAL SCORE: 2
RESTLESS NONPURPOSEFUL MOVEMENTS: 0 - NONE
ACCESSORY MUSCLE RISE IN CLAVICLE DURING INSPIRATION: 0 - NONE
PARADOXICAL BREATHING PATTERN: 0 - NONE
GRUNTING AT END OF EXPIRATION: 0 - NONE
PARADOXICAL BREATHING PATTERN: 0 - NONE
PARADOXICAL BREATHING PATTERN: 0 - NONE
HEART RATE PER MINUTE: 1 - 90-109 BEATS
PARADOXICAL BREATHING PATTERN: 0 - NONE
PARADOXICAL BREATHING PATTERN: 0 - NONE
RESTLESS NONPURPOSEFUL MOVEMENTS: 0 - NONE
RESPIRATORY RATE PER MINUTE: 1 - 19-30 BREATHS
GRUNTING AT END OF EXPIRATION: 0 - NONE
RESPIRATORY RATE PER MINUTE: 1 - 19-30 BREATHS
PARADOXICAL BREATHING PATTERN: 0 - NONE
INVOLUNTARY NASAL FLARING: 0 - NONE
PARADOXICAL BREATHING PATTERN: 0 - NONE
LOOK OF FEAR: 0 - NONE
LOOK OF FEAR: 0 - NONE
INVOLUNTARY NASAL FLARING: 0 - NONE
RESTLESS NONPURPOSEFUL MOVEMENTS: 1 - OCCASIONAL, SLIGHT MOVEMENTS
RESTLESS NONPURPOSEFUL MOVEMENTS: 0 - NONE
RDOS TOTAL SCORE: 3
RDOS TOTAL SCORE: 5
LOOK OF FEAR: 0 - NONE
HEART RATE PER MINUTE: 2 - >109 BEATS
GRUNTING AT END OF EXPIRATION: 0 - NONE
HEART RATE PER MINUTE: 1 - 90-109 BEATS
ACCESSORY MUSCLE RISE IN CLAVICLE DURING INSPIRATION: 0 - NONE
GRUNTING AT END OF EXPIRATION: 0 - NONE
INVOLUNTARY NASAL FLARING: 0 - NONE
RDOS TOTAL SCORE: 5
ACCESSORY MUSCLE RISE IN CLAVICLE DURING INSPIRATION: 0 - NONE
RESTLESS NONPURPOSEFUL MOVEMENTS: 0 - NONE
HEART RATE PER MINUTE: 2 - >109 BEATS
RESPIRATORY RATE PER MINUTE: 1 - 19-30 BREATHS
HEART RATE PER MINUTE: 2 - >109 BEATS
ACCESSORY MUSCLE RISE IN CLAVICLE DURING INSPIRATION: 0 - NONE
GRUNTING AT END OF EXPIRATION: 0 - NONE
RESPIRATORY RATE PER MINUTE: 1 - 19-30 BREATHS
LOOK OF FEAR: 0 - NONE
RDOS TOTAL SCORE: 5
RESPIRATORY RATE PER MINUTE: 2 - >30 BREATHS
PARADOXICAL BREATHING PATTERN: 0 - NONE
HEART RATE PER MINUTE: 1 - 90-109 BEATS
RESTLESS NONPURPOSEFUL MOVEMENTS: 1 - OCCASIONAL, SLIGHT MOVEMENTS
RESTLESS NONPURPOSEFUL MOVEMENTS: 1 - OCCASIONAL, SLIGHT MOVEMENTS
LOOK OF FEAR: 0 - NONE
GRUNTING AT END OF EXPIRATION: 0 - NONE
RDOS TOTAL SCORE: 5
INVOLUNTARY NASAL FLARING: 0 - NONE
HEART RATE PER MINUTE: 2 - >109 BEATS
PARADOXICAL BREATHING PATTERN: 0 - NONE
GRUNTING AT END OF EXPIRATION: 0 - NONE
INVOLUNTARY NASAL FLARING: 0 - NONE
RDOS TOTAL SCORE: 3
HEART RATE PER MINUTE: 2 - >109 BEATS
ACCESSORY MUSCLE RISE IN CLAVICLE DURING INSPIRATION: 0 - NONE
GRUNTING AT END OF EXPIRATION: 0 - NONE
INVOLUNTARY NASAL FLARING: 0 - NONE
LOOK OF FEAR: 0 - NONE
RESPIRATORY RATE PER MINUTE: 1 - 19-30 BREATHS
ACCESSORY MUSCLE RISE IN CLAVICLE DURING INSPIRATION: 0 - NONE
RDOS TOTAL SCORE: 4
INVOLUNTARY NASAL FLARING: 0 - NONE
GRUNTING AT END OF EXPIRATION: 0 - NONE
INVOLUNTARY NASAL FLARING: 0 - NONE
INVOLUNTARY NASAL FLARING: 0 - NONE
LOOK OF FEAR: 0 - NONE
LOOK OF FEAR: 0 - NONE
PARADOXICAL BREATHING PATTERN: 0 - NONE
ACCESSORY MUSCLE RISE IN CLAVICLE DURING INSPIRATION: 0 - NONE
GRUNTING AT END OF EXPIRATION: 0 - NONE
LOOK OF FEAR: 0 - NONE
RESPIRATORY RATE PER MINUTE: 1 - 19-30 BREATHS
LOOK OF FEAR: 0 - NONE
ACCESSORY MUSCLE RISE IN CLAVICLE DURING INSPIRATION: 0 - NONE
RESTLESS NONPURPOSEFUL MOVEMENTS: 1 - OCCASIONAL, SLIGHT MOVEMENTS
ACCESSORY MUSCLE RISE IN CLAVICLE DURING INSPIRATION: 0 - NONE
RESPIRATORY RATE PER MINUTE: 1 - 19-30 BREATHS
GRUNTING AT END OF EXPIRATION: 0 - NONE
LOOK OF FEAR: 0 - NONE
RESTLESS NONPURPOSEFUL MOVEMENTS: 1 - OCCASIONAL, SLIGHT MOVEMENTS
PARADOXICAL BREATHING PATTERN: 0 - NONE
RESTLESS NONPURPOSEFUL MOVEMENTS: 1 - OCCASIONAL, SLIGHT MOVEMENTS
RDOS TOTAL SCORE: 5
GRUNTING AT END OF EXPIRATION: 0 - NONE
LOOK OF FEAR: 0 - NONE
PARADOXICAL BREATHING PATTERN: 0 - NONE
HEART RATE PER MINUTE: 0 - <90 BEATS
GRUNTING AT END OF EXPIRATION: 0 - NONE
RESTLESS NONPURPOSEFUL MOVEMENTS: 0 - NONE
LOOK OF FEAR: 0 - NONE
RDOS TOTAL SCORE: 0
RESPIRATORY RATE PER MINUTE: 2 - >30 BREATHS
HEART RATE PER MINUTE: 1 - 90-109 BEATS
HEART RATE PER MINUTE: 1 - 90-109 BEATS
RDOS TOTAL SCORE: 5
PARADOXICAL BREATHING PATTERN: 0 - NONE
LOOK OF FEAR: 0 - NONE
HEART RATE PER MINUTE: 2 - >109 BEATS
RESTLESS NONPURPOSEFUL MOVEMENTS: 0 - NONE
RESPIRATORY RATE PER MINUTE: 0 - <19 BREATHS
INVOLUNTARY NASAL FLARING: 0 - NONE
LOOK OF FEAR: 0 - NONE
RESTLESS NONPURPOSEFUL MOVEMENTS: 1 - OCCASIONAL, SLIGHT MOVEMENTS
INVOLUNTARY NASAL FLARING: 0 - NONE
LOOK OF FEAR: 0 - NONE
RESPIRATORY RATE PER MINUTE: 1 - 19-30 BREATHS
ACCESSORY MUSCLE RISE IN CLAVICLE DURING INSPIRATION: 0 - NONE
PARADOXICAL BREATHING PATTERN: 0 - NONE
PARADOXICAL BREATHING PATTERN: 0 - NONE
RESPIRATORY RATE PER MINUTE: 1 - 19-30 BREATHS
GRUNTING AT END OF EXPIRATION: 0 - NONE
RESPIRATORY RATE PER MINUTE: 1 - 19-30 BREATHS
PARADOXICAL BREATHING PATTERN: 0 - NONE
ACCESSORY MUSCLE RISE IN CLAVICLE DURING INSPIRATION: 1 - SLIGHT RISE
RESTLESS NONPURPOSEFUL MOVEMENTS: 0 - NONE
PARADOXICAL BREATHING PATTERN: 0 - NONE
INVOLUNTARY NASAL FLARING: 0 - NONE
RESTLESS NONPURPOSEFUL MOVEMENTS: 0 - NONE
HEART RATE PER MINUTE: 1 - 90-109 BEATS
ACCESSORY MUSCLE RISE IN CLAVICLE DURING INSPIRATION: 1 - SLIGHT RISE
RESPIRATORY RATE PER MINUTE: 2 - >30 BREATHS
INVOLUNTARY NASAL FLARING: 0 - NONE
HEART RATE PER MINUTE: 2 - >109 BEATS
RDOS TOTAL SCORE: 5
HEART RATE PER MINUTE: 2 - >109 BEATS
INVOLUNTARY NASAL FLARING: 0 - NONE
RDOS TOTAL SCORE: 2
LOOK OF FEAR: 0 - NONE
GRUNTING AT END OF EXPIRATION: 0 - NONE
LOOK OF FEAR: 0 - NONE
RESTLESS NONPURPOSEFUL MOVEMENTS: 0 - NONE
HEART RATE PER MINUTE: 0 - <90 BEATS
ACCESSORY MUSCLE RISE IN CLAVICLE DURING INSPIRATION: 0 - NONE
ACCESSORY MUSCLE RISE IN CLAVICLE DURING INSPIRATION: 0 - NONE
RESPIRATORY RATE PER MINUTE: 1 - 19-30 BREATHS
RESTLESS NONPURPOSEFUL MOVEMENTS: 1 - OCCASIONAL, SLIGHT MOVEMENTS
INVOLUNTARY NASAL FLARING: 0 - NONE
GRUNTING AT END OF EXPIRATION: 0 - NONE
RESPIRATORY RATE PER MINUTE: 1 - 19-30 BREATHS
GRUNTING AT END OF EXPIRATION: 0 - NONE
RESTLESS NONPURPOSEFUL MOVEMENTS: 1 - OCCASIONAL, SLIGHT MOVEMENTS
RDOS TOTAL SCORE: 2
RESTLESS NONPURPOSEFUL MOVEMENTS: 0 - NONE
ACCESSORY MUSCLE RISE IN CLAVICLE DURING INSPIRATION: 0 - NONE
LOOK OF FEAR: 0 - NONE
RDOS TOTAL SCORE: 1
ACCESSORY MUSCLE RISE IN CLAVICLE DURING INSPIRATION: 0 - NONE
GRUNTING AT END OF EXPIRATION: 0 - NONE
RESTLESS NONPURPOSEFUL MOVEMENTS: 0 - NONE
LOOK OF FEAR: 0 - NONE
RESPIRATORY RATE PER MINUTE: 1 - 19-30 BREATHS
INVOLUNTARY NASAL FLARING: 0 - NONE
PARADOXICAL BREATHING PATTERN: 0 - NONE
GRUNTING AT END OF EXPIRATION: 0 - NONE
LOOK OF FEAR: 0 - NONE
INVOLUNTARY NASAL FLARING: 0 - NONE
ACCESSORY MUSCLE RISE IN CLAVICLE DURING INSPIRATION: 0 - NONE
RESTLESS NONPURPOSEFUL MOVEMENTS: 0 - NONE
RDOS TOTAL SCORE: 5
LOOK OF FEAR: 0 - NONE
RESPIRATORY RATE PER MINUTE: 2 - >30 BREATHS
RDOS TOTAL SCORE: 2
RDOS TOTAL SCORE: 2
RDOS TOTAL SCORE: 5
RDOS TOTAL SCORE: 2
ACCESSORY MUSCLE RISE IN CLAVICLE DURING INSPIRATION: 0 - NONE
GRUNTING AT END OF EXPIRATION: 0 - NONE
LOOK OF FEAR: 0 - NONE
GRUNTING AT END OF EXPIRATION: 0 - NONE
LOOK OF FEAR: 0 - NONE
RDOS TOTAL SCORE: 3
HEART RATE PER MINUTE: 2 - >109 BEATS
ACCESSORY MUSCLE RISE IN CLAVICLE DURING INSPIRATION: 0 - NONE
RDOS TOTAL SCORE: 2
GRUNTING AT END OF EXPIRATION: 0 - NONE
RESTLESS NONPURPOSEFUL MOVEMENTS: 1 - OCCASIONAL, SLIGHT MOVEMENTS
RESTLESS NONPURPOSEFUL MOVEMENTS: 0 - NONE
GRUNTING AT END OF EXPIRATION: 0 - NONE
RESPIRATORY RATE PER MINUTE: 2 - >30 BREATHS
ACCESSORY MUSCLE RISE IN CLAVICLE DURING INSPIRATION: 0 - NONE
INVOLUNTARY NASAL FLARING: 0 - NONE
GRUNTING AT END OF EXPIRATION: 0 - NONE
PARADOXICAL BREATHING PATTERN: 0 - NONE
INVOLUNTARY NASAL FLARING: 0 - NONE
RDOS TOTAL SCORE: 5
ACCESSORY MUSCLE RISE IN CLAVICLE DURING INSPIRATION: 0 - NONE
HEART RATE PER MINUTE: 2 - >109 BEATS
ACCESSORY MUSCLE RISE IN CLAVICLE DURING INSPIRATION: 0 - NONE
INVOLUNTARY NASAL FLARING: 0 - NONE
ACCESSORY MUSCLE RISE IN CLAVICLE DURING INSPIRATION: 0 - NONE
GRUNTING AT END OF EXPIRATION: 0 - NONE
PARADOXICAL BREATHING PATTERN: 0 - NONE
RDOS TOTAL SCORE: 5
HEART RATE PER MINUTE: 1 - 90-109 BEATS
LOOK OF FEAR: 0 - NONE
ACCESSORY MUSCLE RISE IN CLAVICLE DURING INSPIRATION: 0 - NONE
RDOS TOTAL SCORE: 2
INVOLUNTARY NASAL FLARING: 0 - NONE
PARADOXICAL BREATHING PATTERN: 0 - NONE
GRUNTING AT END OF EXPIRATION: 0 - NONE
INVOLUNTARY NASAL FLARING: 0 - NONE
PARADOXICAL BREATHING PATTERN: 0 - NONE
RDOS TOTAL SCORE: 0
RESTLESS NONPURPOSEFUL MOVEMENTS: 1 - OCCASIONAL, SLIGHT MOVEMENTS
HEART RATE PER MINUTE: 1 - 90-109 BEATS
HEART RATE PER MINUTE: 1 - 90-109 BEATS
ACCESSORY MUSCLE RISE IN CLAVICLE DURING INSPIRATION: 0 - NONE
PARADOXICAL BREATHING PATTERN: 0 - NONE
RESPIRATORY RATE PER MINUTE: 1 - 19-30 BREATHS
ACCESSORY MUSCLE RISE IN CLAVICLE DURING INSPIRATION: 0 - NONE
GRUNTING AT END OF EXPIRATION: 0 - NONE
RESPIRATORY RATE PER MINUTE: 1 - 19-30 BREATHS
ACCESSORY MUSCLE RISE IN CLAVICLE DURING INSPIRATION: 0 - NONE
RDOS TOTAL SCORE: 0
INVOLUNTARY NASAL FLARING: 0 - NONE
INVOLUNTARY NASAL FLARING: 0 - NONE
PARADOXICAL BREATHING PATTERN: 0 - NONE
RESPIRATORY RATE PER MINUTE: 1 - 19-30 BREATHS
PARADOXICAL BREATHING PATTERN: 0 - NONE
LOOK OF FEAR: 0 - NONE
HEART RATE PER MINUTE: 2 - >109 BEATS
ACCESSORY MUSCLE RISE IN CLAVICLE DURING INSPIRATION: 1 - SLIGHT RISE
RDOS TOTAL SCORE: 3
ACCESSORY MUSCLE RISE IN CLAVICLE DURING INSPIRATION: 0 - NONE
HEART RATE PER MINUTE: 1 - 90-109 BEATS
HEART RATE PER MINUTE: 1 - 90-109 BEATS
RESPIRATORY RATE PER MINUTE: 2 - >30 BREATHS
RESTLESS NONPURPOSEFUL MOVEMENTS: 0 - NONE
INVOLUNTARY NASAL FLARING: 0 - NONE
ACCESSORY MUSCLE RISE IN CLAVICLE DURING INSPIRATION: 1 - SLIGHT RISE
RESTLESS NONPURPOSEFUL MOVEMENTS: 0 - NONE
LOOK OF FEAR: 0 - NONE
INVOLUNTARY NASAL FLARING: 0 - NONE
HEART RATE PER MINUTE: 2 - >109 BEATS
RESPIRATORY RATE PER MINUTE: 0 - <19 BREATHS
ACCESSORY MUSCLE RISE IN CLAVICLE DURING INSPIRATION: 0 - NONE
RESPIRATORY RATE PER MINUTE: 1 - 19-30 BREATHS
RESPIRATORY RATE PER MINUTE: 1 - 19-30 BREATHS
RESTLESS NONPURPOSEFUL MOVEMENTS: 0 - NONE
RDOS TOTAL SCORE: 2
INVOLUNTARY NASAL FLARING: 0 - NONE
ACCESSORY MUSCLE RISE IN CLAVICLE DURING INSPIRATION: 0 - NONE
HEART RATE PER MINUTE: 1 - 90-109 BEATS
RESPIRATORY RATE PER MINUTE: 2 - >30 BREATHS
LOOK OF FEAR: 0 - NONE
LOOK OF FEAR: 0 - NONE
HEART RATE PER MINUTE: 2 - >109 BEATS
LOOK OF FEAR: 0 - NONE
GRUNTING AT END OF EXPIRATION: 0 - NONE
GRUNTING AT END OF EXPIRATION: 0 - NONE
RESTLESS NONPURPOSEFUL MOVEMENTS: 0 - NONE
RESTLESS NONPURPOSEFUL MOVEMENTS: 0 - NONE
RESPIRATORY RATE PER MINUTE: 2 - >30 BREATHS
LOOK OF FEAR: 0 - NONE
RDOS TOTAL SCORE: 0
HEART RATE PER MINUTE: 1 - 90-109 BEATS
RDOS TOTAL SCORE: 5
GRUNTING AT END OF EXPIRATION: 0 - NONE
GRUNTING AT END OF EXPIRATION: 0 - NONE
PARADOXICAL BREATHING PATTERN: 0 - NONE
RDOS TOTAL SCORE: 3
LOOK OF FEAR: 0 - NONE
LOOK OF FEAR: 0 - NONE
RDOS TOTAL SCORE: 3
GRUNTING AT END OF EXPIRATION: 0 - NONE
PARADOXICAL BREATHING PATTERN: 0 - NONE
ACCESSORY MUSCLE RISE IN CLAVICLE DURING INSPIRATION: 0 - NONE
RESTLESS NONPURPOSEFUL MOVEMENTS: 0 - NONE
GRUNTING AT END OF EXPIRATION: 0 - NONE
GRUNTING AT END OF EXPIRATION: 0 - NONE
HEART RATE PER MINUTE: 2 - >109 BEATS
INVOLUNTARY NASAL FLARING: 0 - NONE
INVOLUNTARY NASAL FLARING: 0 - NONE
ACCESSORY MUSCLE RISE IN CLAVICLE DURING INSPIRATION: 0 - NONE
PARADOXICAL BREATHING PATTERN: 0 - NONE
RDOS TOTAL SCORE: 6
RESPIRATORY RATE PER MINUTE: 1 - 19-30 BREATHS
INVOLUNTARY NASAL FLARING: 0 - NONE
GRUNTING AT END OF EXPIRATION: 0 - NONE
LOOK OF FEAR: 0 - NONE
ACCESSORY MUSCLE RISE IN CLAVICLE DURING INSPIRATION: 0 - NONE
RESTLESS NONPURPOSEFUL MOVEMENTS: 0 - NONE
INVOLUNTARY NASAL FLARING: 0 - NONE
PARADOXICAL BREATHING PATTERN: 0 - NONE
RDOS TOTAL SCORE: 2
ACCESSORY MUSCLE RISE IN CLAVICLE DURING INSPIRATION: 0 - NONE
HEART RATE PER MINUTE: 0 - <90 BEATS
HEART RATE PER MINUTE: 1 - 90-109 BEATS
PARADOXICAL BREATHING PATTERN: 0 - NONE
INVOLUNTARY NASAL FLARING: 0 - NONE
HEART RATE PER MINUTE: 2 - >109 BEATS
RESPIRATORY RATE PER MINUTE: 0 - <19 BREATHS
GRUNTING AT END OF EXPIRATION: 0 - NONE
GRUNTING AT END OF EXPIRATION: 0 - NONE
INVOLUNTARY NASAL FLARING: 0 - NONE
LOOK OF FEAR: 0 - NONE
PARADOXICAL BREATHING PATTERN: 0 - NONE
RESPIRATORY RATE PER MINUTE: 2 - >30 BREATHS
RESPIRATORY RATE PER MINUTE: 1 - 19-30 BREATHS
PARADOXICAL BREATHING PATTERN: 0 - NONE
RDOS TOTAL SCORE: 2
LOOK OF FEAR: 0 - NONE
RESTLESS NONPURPOSEFUL MOVEMENTS: 0 - NONE
RESPIRATORY RATE PER MINUTE: 1 - 19-30 BREATHS
RESTLESS NONPURPOSEFUL MOVEMENTS: 0 - NONE
RESPIRATORY RATE PER MINUTE: 2 - >30 BREATHS
GRUNTING AT END OF EXPIRATION: 0 - NONE
GRUNTING AT END OF EXPIRATION: 0 - NONE
ACCESSORY MUSCLE RISE IN CLAVICLE DURING INSPIRATION: 0 - NONE
RDOS TOTAL SCORE: 2
RESTLESS NONPURPOSEFUL MOVEMENTS: 1 - OCCASIONAL, SLIGHT MOVEMENTS
RESPIRATORY RATE PER MINUTE: 2 - >30 BREATHS
PARADOXICAL BREATHING PATTERN: 0 - NONE
RESTLESS NONPURPOSEFUL MOVEMENTS: 1 - OCCASIONAL, SLIGHT MOVEMENTS
HEART RATE PER MINUTE: 1 - 90-109 BEATS
RESPIRATORY RATE PER MINUTE: 2 - >30 BREATHS
PARADOXICAL BREATHING PATTERN: 0 - NONE
RESPIRATORY RATE PER MINUTE: 2 - >30 BREATHS
ACCESSORY MUSCLE RISE IN CLAVICLE DURING INSPIRATION: 0 - NONE
RESTLESS NONPURPOSEFUL MOVEMENTS: 1 - OCCASIONAL, SLIGHT MOVEMENTS
HEART RATE PER MINUTE: 1 - 90-109 BEATS
RESPIRATORY RATE PER MINUTE: 1 - 19-30 BREATHS
INVOLUNTARY NASAL FLARING: 0 - NONE
PARADOXICAL BREATHING PATTERN: 0 - NONE
RDOS TOTAL SCORE: 3
HEART RATE PER MINUTE: 0 - <90 BEATS
PARADOXICAL BREATHING PATTERN: 0 - NONE
PARADOXICAL BREATHING PATTERN: 0 - NONE
RESTLESS NONPURPOSEFUL MOVEMENTS: 0 - NONE
HEART RATE PER MINUTE: 2 - >109 BEATS
ACCESSORY MUSCLE RISE IN CLAVICLE DURING INSPIRATION: 0 - NONE
HEART RATE PER MINUTE: 2 - >109 BEATS
LOOK OF FEAR: 0 - NONE
INVOLUNTARY NASAL FLARING: 0 - NONE
LOOK OF FEAR: 0 - NONE
GRUNTING AT END OF EXPIRATION: 0 - NONE
INVOLUNTARY NASAL FLARING: 0 - NONE
PARADOXICAL BREATHING PATTERN: 0 - NONE
ACCESSORY MUSCLE RISE IN CLAVICLE DURING INSPIRATION: 0 - NONE
PARADOXICAL BREATHING PATTERN: 0 - NONE
HEART RATE PER MINUTE: 2 - >109 BEATS
PARADOXICAL BREATHING PATTERN: 0 - NONE
RDOS TOTAL SCORE: 2
RESPIRATORY RATE PER MINUTE: 1 - 19-30 BREATHS
RESPIRATORY RATE PER MINUTE: 1 - 19-30 BREATHS
RDOS TOTAL SCORE: 1
INVOLUNTARY NASAL FLARING: 0 - NONE

## 2025-01-01 ASSESSMENT — PAIN SCALES - GENERAL
PAINLEVEL_OUTOF10: 0 - NO PAIN
PAINLEVEL_OUTOF10: 6
PAINLEVEL_OUTOF10: 0 - NO PAIN
PAINLEVEL_OUTOF10: 0 - NO PAIN
PAINLEVEL_OUTOF10: 5 - MODERATE PAIN
PAIN_LEVEL: 0
PAINLEVEL_OUTOF10: 0 - NO PAIN

## 2025-01-01 ASSESSMENT — PATIENT HEALTH QUESTIONNAIRE - PHQ9
2. FEELING DOWN, DEPRESSED OR HOPELESS: NOT AT ALL
1. LITTLE INTEREST OR PLEASURE IN DOING THINGS: NOT AT ALL
SUM OF ALL RESPONSES TO PHQ9 QUESTIONS 1 & 2: 0

## 2025-01-01 ASSESSMENT — ACTIVITIES OF DAILY LIVING (ADL)
JUDGMENT_ADEQUATE_SAFELY_COMPLETE_DAILY_ACTIVITIES: YES
FEEDING YOURSELF: DEPENDENT
HEARING - LEFT EAR: FUNCTIONAL
WALKS IN HOME: DEPENDENT
ADEQUATE_TO_COMPLETE_ADL: YES
DRESSING YOURSELF: DEPENDENT
TOILETING: DEPENDENT
LACK_OF_TRANSPORTATION: NO
HEARING - RIGHT EAR: FUNCTIONAL
PATIENT'S MEMORY ADEQUATE TO SAFELY COMPLETE DAILY ACTIVITIES?: YES
LACK_OF_TRANSPORTATION: NO
BATHING: DEPENDENT
ASSISTIVE_DEVICE: WALKER
GROOMING: DEPENDENT

## 2025-01-01 ASSESSMENT — PAIN - FUNCTIONAL ASSESSMENT
PAIN_FUNCTIONAL_ASSESSMENT: CPOT (CRITICAL CARE PAIN OBSERVATION TOOL)
PAIN_FUNCTIONAL_ASSESSMENT: CPOT (CRITICAL CARE PAIN OBSERVATION TOOL)
PAIN_FUNCTIONAL_ASSESSMENT: 0-10
PAIN_FUNCTIONAL_ASSESSMENT: CPOT (CRITICAL CARE PAIN OBSERVATION TOOL)
PAIN_FUNCTIONAL_ASSESSMENT: 0-10
PAIN_FUNCTIONAL_ASSESSMENT: CPOT (CRITICAL CARE PAIN OBSERVATION TOOL)
PAIN_FUNCTIONAL_ASSESSMENT: 0-10
PAIN_FUNCTIONAL_ASSESSMENT: CPOT (CRITICAL CARE PAIN OBSERVATION TOOL)
PAIN_FUNCTIONAL_ASSESSMENT: CPOT (CRITICAL CARE PAIN OBSERVATION TOOL)
PAIN_FUNCTIONAL_ASSESSMENT: 0-10
PAIN_FUNCTIONAL_ASSESSMENT: CPOT (CRITICAL CARE PAIN OBSERVATION TOOL)
PAIN_FUNCTIONAL_ASSESSMENT: 0-10
PAIN_FUNCTIONAL_ASSESSMENT: 0-10
PAIN_FUNCTIONAL_ASSESSMENT: CPOT (CRITICAL CARE PAIN OBSERVATION TOOL)
PAIN_FUNCTIONAL_ASSESSMENT: 0-10
PAIN_FUNCTIONAL_ASSESSMENT: CPOT (CRITICAL CARE PAIN OBSERVATION TOOL)
PAIN_FUNCTIONAL_ASSESSMENT: 0-10
PAIN_FUNCTIONAL_ASSESSMENT: CPOT (CRITICAL CARE PAIN OBSERVATION TOOL)
PAIN_FUNCTIONAL_ASSESSMENT: CPOT (CRITICAL CARE PAIN OBSERVATION TOOL)
PAIN_FUNCTIONAL_ASSESSMENT: 0-10
PAIN_FUNCTIONAL_ASSESSMENT: 0-10
PAIN_FUNCTIONAL_ASSESSMENT: CPOT (CRITICAL CARE PAIN OBSERVATION TOOL)
PAIN_FUNCTIONAL_ASSESSMENT: 0-10
PAIN_FUNCTIONAL_ASSESSMENT: CPOT (CRITICAL CARE PAIN OBSERVATION TOOL)
PAIN_FUNCTIONAL_ASSESSMENT: CPOT (CRITICAL CARE PAIN OBSERVATION TOOL)
PAIN_FUNCTIONAL_ASSESSMENT: 0-10
PAIN_FUNCTIONAL_ASSESSMENT: CPOT (CRITICAL CARE PAIN OBSERVATION TOOL)
PAIN_FUNCTIONAL_ASSESSMENT: CPOT (CRITICAL CARE PAIN OBSERVATION TOOL)
PAIN_FUNCTIONAL_ASSESSMENT: 0-10

## 2025-01-01 ASSESSMENT — LIFESTYLE VARIABLES
AUDIT-C TOTAL SCORE: 0
SKIP TO QUESTIONS 9-10: 1
HOW OFTEN DO YOU HAVE A DRINK CONTAINING ALCOHOL: NEVER
AUDIT-C TOTAL SCORE: 0
HOW OFTEN DO YOU HAVE 6 OR MORE DRINKS ON ONE OCCASION: NEVER
HOW MANY STANDARD DRINKS CONTAINING ALCOHOL DO YOU HAVE ON A TYPICAL DAY: PATIENT DOES NOT DRINK

## 2025-01-01 ASSESSMENT — PAIN DESCRIPTION - LOCATION: LOCATION: CHEST

## 2025-01-01 ASSESSMENT — PAIN DESCRIPTION - PAIN TYPE: TYPE: ACUTE PAIN

## 2025-01-04 DIAGNOSIS — E78.2 MIXED HYPERLIPIDEMIA: ICD-10-CM

## 2025-01-08 RX ORDER — ATORVASTATIN CALCIUM 20 MG/1
20 TABLET, FILM COATED ORAL NIGHTLY
Qty: 90 TABLET | Refills: 1 | Status: SHIPPED | OUTPATIENT
Start: 2025-01-08

## 2025-01-09 ENCOUNTER — APPOINTMENT (OUTPATIENT)
Dept: PODIATRY | Facility: CLINIC | Age: 85
End: 2025-01-09
Payer: MEDICARE

## 2025-01-09 DIAGNOSIS — M20.42 HAMMER TOE OF LEFT FOOT: ICD-10-CM

## 2025-01-09 DIAGNOSIS — B35.1 ONYCHOMYCOSIS: Primary | ICD-10-CM

## 2025-01-09 DIAGNOSIS — M79.674 PAIN IN TOES OF BOTH FEET: ICD-10-CM

## 2025-01-09 DIAGNOSIS — M79.675 PAIN IN TOES OF BOTH FEET: ICD-10-CM

## 2025-01-09 DIAGNOSIS — S90.222A SUBUNGUAL HEMATOMA OF TOE, LEFT, INITIAL ENCOUNTER: ICD-10-CM

## 2025-01-09 PROCEDURE — 99213 OFFICE O/P EST LOW 20 MIN: CPT | Performed by: PODIATRIST

## 2025-01-09 NOTE — PROGRESS NOTES
History Of Present Illness  Betty Stein is a 84 y.o. female presenting with painful elongated nails. Left great toe looked at.    PCP Ronald Lowry MD  Last visit 10/24/24     Past Medical History  She has a past medical history of Encounter for examination for normal comparison and control in clinical research program (01/21/2019), Inflamed seborrheic keratosis (05/14/2020), Ingrowing nail (04/15/2021), Nonrheumatic aortic (valve) stenosis (05/10/2019), and Personal history of other diseases of the musculoskeletal system and connective tissue (11/11/2021).    Surgical History  She has a past surgical history that includes Other surgical history (06/29/2018); Other surgical history (11/11/2021); Other surgical history (11/11/2021); Other surgical history (11/11/2021); Other surgical history (11/11/2021); Other surgical history (11/11/2021); Other surgical history (11/11/2021); Other surgical history (11/11/2021); and Other surgical history (11/18/2021).     Social History  She reports that she has never smoked. She has never been exposed to tobacco smoke. She has never used smokeless tobacco. She reports that she does not currently use alcohol after a past usage of about 1.0 standard drink of alcohol per week. She reports that she does not use drugs.    Family History  Family History   Problem Relation Name Age of Onset    Coronary artery disease Mother      No Known Problems Father          Allergies  Patient has no known allergies.    Medications  Current Outpatient Medications   Medication Sig Dispense Refill    ALPRAZolam (Xanax) 0.25 mg tablet Take 1 tablet (0.25 mg) by mouth 2 times a day as needed for anxiety. 20 tablet 0    amLODIPine (Norvasc) 5 mg tablet Take 1 tablet (5 mg) by mouth once daily. 90 tablet 3    aspirin 81 mg EC tablet Take 1 tablet (81 mg) by mouth once daily. 90 tablet 3    atorvastatin (Lipitor) 20 mg tablet TAKE 1 TABLET AT BEDTIME 90 tablet 1    cholecalciferol (Vitamin D-3) 25  MCG (1000 UT) capsule Take 1 capsule (25 mcg) by mouth once daily.      dorzolamide-timoloL (Cosopt) 22.3-6.8 mg/mL ophthalmic solution Administer 1 drop into affected eye(s) twice a day.      ezetimibe (Zetia) 10 mg tablet TAKE 1 TABLET AT BEDTIME 90 tablet 1    hydrocortisone 2.5 % lotion Apply 1 Application topically twice a day.      irbesartan-hydrochlorothiazide (Avalide) 150-12.5 mg tablet Take 1 tablet by mouth once daily. 90 tablet 3    latanoprost (Xalatan) 0.005 % ophthalmic solution Administer 1 drop into both eyes once daily at bedtime.      multivit-min/ferrous fumarate (MULTI VITAMIN ORAL) Take 1 tablet by mouth once daily.      nitroglycerin (Nitrostat) 0.4 mg SL tablet Place 1 tablet (0.4 mg) under the tongue every 5 minutes if needed for chest pain. TAKE PER DIRECTED 25 tablet 3     No current facility-administered medications for this visit.       Review of Systems    REVIEW OF SYSTEMS  GENERAL:  Negative for malaise, significant weight loss, fever  CARDIOVASCULAR: leg swelling   MUSCULOSKELETAL:  Negative for joint pain or swelling, back pain, and muscle pain.  SKIN:  Negative for lesions, rash, and itching  PSYCH:  Negative for sleep disturbance, mood disorder and recent psychosocial stressors  NEURO: Negative, denies any burning, tingling or numbness     Objective: Shuffling gait   Vasc: DP and PT pulses are palpable bilateral.  CFT is less than 3 seconds bilateral.  Skin temperature is warm to cool proximal to distal bilateral.  Some stasis changes noted    Neuro:  Light touch is intact to the foot bilateral.      Derm: Nails 1-5 bilateral are thickened, elongated and crumbly with subungual debris. Skin is supple with normal texture and turgor noted.  Webspaces are clean, dry and intact bilateral.  There are no hyperkeratoses, ulcerations, verruca or other lesions noted. Hematoma under left first toenail. Toenail is loose   Ortho: Muscle strength is 5/5 for all pedal groups tested.  Ankle  joint, subtalar joint, 1st MPJ and lesser MPJ ROM is full and without pain or crepitus.  The foot type is rectus bilateral off weight bearing. Hammertoe 5th left toe with callous present medial and lateral toe  Assessment/Plan   Painful nail mycosis  Left 5th hammertoe  Toenail contusion - atraumatically removed toenail L1   Avoid OR, continue padding toe   Toenails are debrided in length and thickness to avoid infection and for pain relief    Pamela Barraza-Magen, MALKA  [address] [place] [address]

## 2025-02-22 DIAGNOSIS — E78.2 MIXED HYPERLIPIDEMIA: ICD-10-CM

## 2025-02-24 RX ORDER — EZETIMIBE 10 MG/1
10 TABLET ORAL NIGHTLY
Qty: 90 TABLET | Refills: 1 | Status: SHIPPED | OUTPATIENT
Start: 2025-02-24

## 2025-03-13 ENCOUNTER — APPOINTMENT (OUTPATIENT)
Dept: CARDIOLOGY | Facility: CLINIC | Age: 85
End: 2025-03-13
Payer: MEDICARE

## 2025-03-14 ENCOUNTER — APPOINTMENT (OUTPATIENT)
Dept: CARDIOLOGY | Facility: CLINIC | Age: 85
End: 2025-03-14
Payer: MEDICARE

## 2025-03-14 VITALS
WEIGHT: 127 LBS | BODY MASS INDEX: 19.31 KG/M2 | DIASTOLIC BLOOD PRESSURE: 50 MMHG | HEART RATE: 60 BPM | OXYGEN SATURATION: 99 % | SYSTOLIC BLOOD PRESSURE: 108 MMHG

## 2025-03-14 DIAGNOSIS — E78.2 MIXED HYPERLIPIDEMIA: ICD-10-CM

## 2025-03-14 DIAGNOSIS — I25.10 CORONARY ARTERY DISEASE INVOLVING NATIVE CORONARY ARTERY OF NATIVE HEART WITHOUT ANGINA PECTORIS: ICD-10-CM

## 2025-03-14 DIAGNOSIS — Z95.2 S/P TAVR (TRANSCATHETER AORTIC VALVE REPLACEMENT): ICD-10-CM

## 2025-03-14 DIAGNOSIS — I10 PRIMARY HYPERTENSION: ICD-10-CM

## 2025-03-14 DIAGNOSIS — R26.9 GAIT DISTURBANCE: Primary | ICD-10-CM

## 2025-03-14 PROCEDURE — 99214 OFFICE O/P EST MOD 30 MIN: CPT | Performed by: INTERNAL MEDICINE

## 2025-03-14 PROCEDURE — 1036F TOBACCO NON-USER: CPT | Performed by: INTERNAL MEDICINE

## 2025-03-14 PROCEDURE — 1160F RVW MEDS BY RX/DR IN RCRD: CPT | Performed by: INTERNAL MEDICINE

## 2025-03-14 PROCEDURE — G2211 COMPLEX E/M VISIT ADD ON: HCPCS | Performed by: INTERNAL MEDICINE

## 2025-03-14 PROCEDURE — 3078F DIAST BP <80 MM HG: CPT | Performed by: INTERNAL MEDICINE

## 2025-03-14 PROCEDURE — 1123F ACP DISCUSS/DSCN MKR DOCD: CPT | Performed by: INTERNAL MEDICINE

## 2025-03-14 PROCEDURE — 1159F MED LIST DOCD IN RCRD: CPT | Performed by: INTERNAL MEDICINE

## 2025-03-14 PROCEDURE — 3074F SYST BP LT 130 MM HG: CPT | Performed by: INTERNAL MEDICINE

## 2025-03-14 RX ORDER — PENTOXIFYLLINE 400 MG/1
400 TABLET, EXTENDED RELEASE ORAL
COMMUNITY
Start: 2025-03-10

## 2025-03-14 ASSESSMENT — ENCOUNTER SYMPTOMS
CARDIOVASCULAR NEGATIVE: 1
DIZZINESS: 1
FATIGUE: 1
GASTROINTESTINAL NEGATIVE: 1
ARTHRALGIAS: 1
PSYCHIATRIC NEGATIVE: 1
RESPIRATORY NEGATIVE: 1
ACTIVITY CHANGE: 1

## 2025-03-14 NOTE — PROGRESS NOTES
"  Patient:  Betty Stein  YOB: 1940  MRN: [id number]       Chief Complaint/Active Symptoms:       Betty Stein is a 85 y.o. female who returns today for cardiac follow-up of coronary artery disease, hypertension, hyperlipidemia and history of aortic stenosis status post TAVR.    Doing well except for getting \"old\". No chest pain or discomfort, no shortness of breath. Feels \"wobbly\" and has difficulty walking. Does not use a cane or any other aid to assist her. No palpitations, no syncope or near syncope. Did not receive physical therapy.     Review of Systems   Constitutional:  Positive for activity change and fatigue.   HENT:  Positive for hearing loss.    Respiratory: Negative.     Cardiovascular: Negative.    Gastrointestinal: Negative.    Musculoskeletal:  Positive for arthralgias and gait problem.   Neurological:  Positive for dizziness.   Psychiatric/Behavioral: Negative.     All other systems reviewed and are negative.      Objective:     Vitals:    03/14/25 1126   BP: 108/50   Pulse: 60   SpO2: 99%       Vitals:    03/14/25 1126   Weight: 57.6 kg (127 lb)       Allergies:     No Known Allergies       Medications:     Current Outpatient Medications   Medication Instructions    ALPRAZolam (XANAX) 0.25 mg, oral, 2 times daily PRN    amLODIPine (NORVASC) 5 mg, oral, Daily    aspirin 81 mg, oral, Daily    atorvastatin (LIPITOR) 20 mg, oral, Nightly    cholecalciferol (VITAMIN D-3) 25 mcg, Daily    dorzolamide-timoloL (Cosopt) 22.3-6.8 mg/mL ophthalmic solution 1 drop, 2 times daily    ezetimibe (ZETIA) 10 mg, oral, Nightly    hydrocortisone 2.5 % lotion 1 Application, 2 times daily    irbesartan-hydrochlorothiazide (Avalide) 150-12.5 mg tablet 1 tablet, oral, Daily    latanoprost (Xalatan) 0.005 % ophthalmic solution 1 drop, Nightly    multivit-min/ferrous fumarate (MULTI VITAMIN ORAL) 1 tablet, Daily    nitroglycerin (NITROSTAT) 0.4 mg, sublingual, Every 5 min PRN, TAKE PER DIRECTED    " "pentoxifylline (TRENTAL) 400 mg, 3 times daily (morning, midday, late afternoon)       Physical Examination:   GENERAL:  Well developed, well nourished, in no acute distress.  HEENT: NC AT, EOMI with anicteric sclera  NECK: Reduced range of motion, no JVD, no bruit.  CHEST:  Symmetric and nontender.  LUNGS:  Clear to auscultation bilaterally, normal respiratory effort.  HEART: PMI is not displaced.  There is a regular rhythm with a normal S1 and S2 no appreciable S3.  There is a soft systolic crescendo murmur heard at the upper sternal border no diastolic murmur.  Pedal pulses are diminished 1+ but there is normal perfusion.  No edema.    ABDOMEN: Soft, NT, ND without palpable organomegaly or bruits  EXTREMITIES:  Warm with good color, no clubbing or cyanosis.  There is no edema noted.  PERIPHERAL VASCULAR:  Pulses present and equally palpable, diminished pedal pulses with normal perfusion  MUSCULOSKELETAL: Osteoarthritic changes with sarcopenia  NEURO/PSYCH:  Alert and oriented times three with approppriate behavior and responses. Nonfocal motor examination with a short quick step.    Lymph: No significant palpable lymphadenopathy  Skin: no rash or lesions on exposed skin or reported.    Lab:     CBC:   Lab Results   Component Value Date    WBC 5.9 10/25/2024    RBC 4.47 10/25/2024    HGB 14.0 10/25/2024    HCT 42.8 10/25/2024     10/25/2024        CMP:    Lab Results   Component Value Date     10/25/2024    K 4.2 10/25/2024     10/25/2024    CO2 29 10/25/2024    BUN 35 (H) 10/25/2024    CREATININE 1.14 (H) 10/25/2024    GLUCOSE 95 10/25/2024    CALCIUM 9.5 10/25/2024       Magnesium:    No results found for: \"MG\"    Lipid Profile:    Lab Results   Component Value Date    TRIG 143 10/25/2024    HDL 66.0 10/25/2024    LDLCALC 108 (H) 10/25/2024       TSH:    Lab Results   Component Value Date    TSH 4.24 (H) 10/25/2024       BNP:   Lab Results   Component Value Date    BNP 97 08/20/2020    " "    PT/INR:    Lab Results   Component Value Date    PROTIME 11.5 01/10/2022    INR 1.0 01/10/2022       HgBA1c:    No results found for: \"HGBA1C\"    BMP:  Lab Results   Component Value Date     10/25/2024     04/11/2024     10/30/2023    K 4.2 10/25/2024    K 4.4 04/11/2024    K 3.6 10/30/2023     10/25/2024     04/11/2024    CL 99 10/30/2023    CO2 29 10/25/2024    CO2 29 04/11/2024    CO2 28 10/30/2023    BUN 35 (H) 10/25/2024    BUN 32 (H) 04/11/2024    BUN 21 10/30/2023    CREATININE 1.14 (H) 10/25/2024    CREATININE 1.12 (H) 04/11/2024    CREATININE 1.08 (H) 10/30/2023       Cardiac Enzymes:    No results found for: \"TROPHS\"    Hepatic Function Panel:    Lab Results   Component Value Date    ALKPHOS 51 10/25/2024    ALT 13 10/25/2024    AST 19 10/25/2024    PROT 6.8 10/25/2024    BILITOT 0.6 10/25/2024         Diagnostic Studies:     Transthoracic Echo (TTE) Complete    Result Date: 9/26/2024                  [address], [place] [address]            Tel [phone] Fax [phone] TRANSTHORACIC ECHOCARDIOGRAM REPORT Patient Name:      KAYLEE HATFIELD     Natalia Physician:    [id number] Phelps MD Study Date:        9/25/2024            Ordering Provider:    Latoya PHELPS MRN/PID:           [id number]             Fellow: Accession#:        [id number]         Nurse:                Desi Culp RN Date of Birth/Age: 1940 / 84 years Sonographer:          Cady Foss RDMS,                                                               RDCS, RVT Gender:            F                    Additional Staff: Height:            175.26 cm            Admit Date: Weight:            56.70 kg             Admission Status:     Outpatient BSA / BMI:         1.69 m2 / 18.46      Department Location:  [facility]" [facility]                    kg/m2                                      [place] Blood Pressure: 122 /64 mmHg Study Type:    TRANSTHORACIC ECHO (TTE) COMPLETE Diagnosis/ICD: Atherosclerotic heart disease of native coronary artery without                angina pectoris-I25.10; Presence of prosthetic heart valve-Z95.2 Indication:    TAVR Evolut Pro 26mm (3/2019), CAD, HTN and HLD CPT Codes:     Echo Complete w Full Doppler-[id number]  Study Detail: The following Echo studies were performed: 2D, M-Mode, Doppler and               color flow. Optison used as a contrast agent for endocardial               border definition. Total contrast used for this procedure was 0.5               mL via IV push.  PHYSICIAN INTERPRETATION: Left Ventricle: Left ventricular ejection fraction is normal, by visual estimate at 55-60%. Wall motion is abnormal. The left ventricular cavity size is normal. There is borderline concentric left ventricular hypertrophy. Spectral Doppler shows an impaired relaxation pattern of left ventricular diastolic filling. There is no definite left ventricular thrombus visualized. The intraventricular septum appears intact without evidence of shunting or a ventricular septal defect. LV Wall Scoring: The basal inferior segment is hypokinetic. All remaining scored segments are normal. Left Atrium: The left atrium is upper limits of normal in size. There is no atrial septal defect present. Right Ventricle: The right ventricle is upper limits of normal in size. There is low normal right ventricular systolic function. Right Atrium: The right atrium is upper limits of normal in size. Aortic Valve: There is a prosthetic aortic valve present. The aortic valve dimensionless index is 0.91. There is a transcatheter aortic valve replacement, with a 26 mm reported size. There is trace prosthetic aortic valve regurgitation. There is trace aortic valve regurgitation. The peak instantaneous gradient of the aortic valve is 5.9  mmHg. The mean gradient of the aortic valve is 3.3 mmHg. Mitral Valve: The mitral valve is moderately thickened. There is no evidence of mitral valve stenosis. The doppler estimated mean and peak diastolic pressure gradients are 3.1 mmHg and 7.8 mmHg respectively. The doppler estimated mitral valve area is 3.28 cmï¿½ by the pressure half time method. There is moderate to severe mitral annular calcification. There is mild to moderate mitral valve regurgitation. Tricuspid Valve: The tricuspid valve is abnormal. There is no evidence of tricuspid valve stenosis. There is mild thickening of the tricuspid valve leaflets. The tricuspid valve annulus appears calcified. There is mild to moderate tricuspid regurgitation. The Doppler estimated RVSP is within normal limits at 27.7 mmHg. Pulmonic Valve: The pulmonic valve is not well visualized. There is no indication of pulmonic valve regurgitation. Pericardium: No pericardial effusion noted. Aorta: The aortic root is normal. Systemic Veins: The inferior vena cava appears normal in size, with IVC inspiratory collapse greater than 50%. In comparison to the previous echocardiogram(s): Compared with study dated 3/24/2021,. LVEF has mildly declined. TAVR gradients and trivial AI are unchanged.  CONCLUSIONS:  1. Basal inferior segment is abnormal.  2. Left ventricular ejection fraction is normal, by visual estimate at 55-60%.  3. Abnormal wall motion.  4. Spectral Doppler shows an impaired relaxation pattern of left ventricular diastolic filling.  5. Intact intraventricular septum without shunting or a ventricular septal defect.  6. No left ventricular thrombus visualized.  7. There is low normal right ventricular systolic function.  8. The mitral valve is moderately thickened.  9. There is moderate to severe mitral annular calcification. 10. Mild to moderate mitral valve regurgitation. 11. There is No tricuspid stenosis. 12. Mild to moderate tricuspid regurgitation. 13. Right  ventricular within normal limits. 14. There is a transcatheter aortic valve replacement, with a 26mm reported size. 15. LVEF has mildly declined. TAVR gradients and trivial AI are unchanged. QUANTITATIVE DATA SUMMARY:  2D MEASUREMENTS:           Normal Ranges: Ao Root d:       2.40 cm   (2.0-3.7cm) LAs:             3.98 cm   (2.7-4.0cm) RVIDd:           3.14 cm   (0.9-3.6cm) IVSd:            1.06 cm   (0.6-1.1cm) LVPWd:           1.02 cm   (0.6-1.1cm) LVIDd:           3.66 cm   (3.9-5.9cm) LVIDs:           2.44 cm LV Mass Index:   69.5 g/m2 LV % FS          33.5 %  LA VOLUME:                   Normal Ranges: LA Vol A4C:       36.3 ml    (22+/-6mL/m2) LA Vol A2C:       56.5 ml LA Vol BP:        45.9 ml LA Vol Index A4C: 21.4ml/m2 LA Vol Index A2C: 33.4 ml/m2 LA Vol Index BP:  27.1 ml/m2 LA Vol A4C:       33.6 ml LA Vol A2C:       54.5 ml LA Vol Index BSA: 26.0 ml/m2  LV SYSTOLIC FUNCTION BY 2D PLANIMETRY (MOD):                      Normal Ranges: EF-A4C View:    60 % (>=55%) EF-A2C View:    55 % EF-Visual:      58 % EF-Auto:        47 % LV EF Reported: 58 %  LV DIASTOLIC FUNCTION:           Normal Ranges: MV Peak E:             1.02 m/s  (0.7-1.2 m/s) MV Peak A:             1.27 m/s  (0.42-0.7 m/s) E/A Ratio:             0.81      (1.0-2.2) MV e'                  0.047 m/s (>8.0) MV lateral e'          0.05 m/s MV medial e'           0.04 m/s E/e' Ratio:            21.68     (<8.0)  MITRAL VALVE:          Normal Ranges: MV Vmax:      1.40 m/s (<=1.3m/s) MV peak P.8 mmHg (<5mmHg) MV mean PG:   3.1 mmHg (<48mmHg) MV VTI:       36.07 cm (10-13cm) MV DT:        231 msec (150-240msec) MV PHT:       67 msec  (30-60msec) MVA by PHT:   3.28 cm2 (4-6cm2)  MITRAL INSUFFICIENCY:             Normal Ranges: MR VTI:               149.95 cm MR Vmax:              579.03 cm/s  AORTIC VALVE:                     Normal Ranges: AoV Vmax:                1.21 m/s (<=1.7m/s) AoV Peak P.9 mmHg (<20mmHg) AoV Mean PG:              3.3 mmHg (1.7-11.5mmHg) LVOT Max Santi:            1.19 m/s (<=1.1m/s) AoV VTI:                 30.15 cm (18-25cm) LVOT VTI:                27.29 cm LVOT Diameter:           1.93 cm  (1.8-2.4cm) AoV Area, VTI:           2.66 cm2 (2.5-5.5cm2) AoV Area,Vmax:           2.89 cm2 (2.5-4.5cm2) AoV Dimensionless Index: 0.91  RIGHT VENTRICLE: RV Major 5.3 cm TAPSE:   17.5 mm RV s'    0.10 m/s  TRICUSPID VALVE/RVSP:          Normal Ranges: Peak TR Velocity:     2.38 m/s Est. RA Pressure:     5 mmHg RV Syst Pressure:     28 mmHg  (< 30mmHg) IVC Diam:             1.44 cm  PULMONIC VALVE:          Normal Ranges: PV Max Santi:     0.8 m/s  (0.6-0.9m/s) PV Max P.3 mmHg  AORTA: Asc Ao Diam 2.96 cm  [id number] Amisha Phelps MD Electronically signed on 2024 at 10:04:46 PM  Wall Scoring  ** Final **      Last year's echo reviewed    Radiology:     Transthoracic Echo Limited    (Results Pending)     ASSESSMENT     Problem List Items Addressed This Visit       CAD (coronary artery disease)    Relevant Orders    Transthoracic Echo Limited    Follow Up In Cardiology    HLD (hyperlipidemia)    HTN (hypertension)    S/P TAVR (transcatheter aortic valve replacement)    Relevant Orders    Transthoracic Echo Limited    Follow Up In Cardiology    Gait disturbance - Primary    Relevant Orders    Referral to Physical Therapy       PLAN   1.  Coronary artery disease with history of coronary intervention.  Stable without angina pectoris continue conservative medical therapy and risk factor modification.  2.  Hypertension.  Blood pressures of anything are a little on the low side.  She has no orthostatic symptoms.  Blood pressures remain this low would discontinue her amlodipine and then would get rid of the hydrochlorothiazide with her irbesartan.  3.  Hyperlipidemia.  Continue statin therapy.  4.  History of aortic stenosis status post TAVR.  Will check her echocardiogram this fall and go to every 2 years.  Does not appear to  have any significant stenotic lesion by examination.  5.  Gait disturbance and fall.  The patient is clearly not steady on her feet taking this very short rapid stride rather than length even.  She is cut off her physical activity because of her concerns about falling and feeling unsteady when she walks.  At this time she refuses to use a walking aid.  We referred her to physical therapy for strengthening and gait evaluation and treatment.    Will see her in the fall after echocardiogram sooner if there is any problems or concerns.

## 2025-03-27 ENCOUNTER — APPOINTMENT (OUTPATIENT)
Dept: PODIATRY | Facility: CLINIC | Age: 85
End: 2025-03-27
Payer: MEDICARE

## 2025-03-27 DIAGNOSIS — B35.1 ONYCHOMYCOSIS: Primary | ICD-10-CM

## 2025-03-27 DIAGNOSIS — M79.675 PAIN IN TOES OF BOTH FEET: ICD-10-CM

## 2025-03-27 DIAGNOSIS — M79.674 PAIN IN TOES OF BOTH FEET: ICD-10-CM

## 2025-03-27 PROCEDURE — 11721 DEBRIDE NAIL 6 OR MORE: CPT | Performed by: PODIATRIST

## 2025-03-27 NOTE — PROGRESS NOTES
History Of Present Illness  Betty Stein is a 85 y.o. female presenting with painful elongated nails.     PCP Ronald Lowry MD  Last visit 10/24/24     Past Medical History  She has a past medical history of Encounter for examination for normal comparison and control in clinical research program (01/21/2019), Inflamed seborrheic keratosis (05/14/2020), Ingrowing nail (04/15/2021), Nonrheumatic aortic (valve) stenosis (05/10/2019), and Personal history of other diseases of the musculoskeletal system and connective tissue (11/11/2021).    Surgical History  She has a past surgical history that includes Other surgical history (06/29/2018); Other surgical history (11/11/2021); Other surgical history (11/11/2021); Other surgical history (11/11/2021); Other surgical history (11/11/2021); Other surgical history (11/11/2021); Other surgical history (11/11/2021); Other surgical history (11/11/2021); and Other surgical history (11/18/2021).     Social History  She reports that she has never smoked. She has never been exposed to tobacco smoke. She has never used smokeless tobacco. She reports that she does not currently use alcohol after a past usage of about 1.0 standard drink of alcohol per week. She reports that she does not use drugs.    Family History  Family History   Problem Relation Name Age of Onset    Coronary artery disease Mother      No Known Problems Father          Allergies  Patient has no known allergies.    Medications  Current Outpatient Medications   Medication Sig Dispense Refill    ALPRAZolam (Xanax) 0.25 mg tablet Take 1 tablet (0.25 mg) by mouth 2 times a day as needed for anxiety. 20 tablet 0    amLODIPine (Norvasc) 5 mg tablet Take 1 tablet (5 mg) by mouth once daily. 90 tablet 3    aspirin 81 mg EC tablet Take 1 tablet (81 mg) by mouth once daily. 90 tablet 3    atorvastatin (Lipitor) 20 mg tablet TAKE 1 TABLET AT BEDTIME 90 tablet 1    cholecalciferol (Vitamin D-3) 25 MCG (1000 UT) capsule Take  1 capsule (25 mcg) by mouth once daily.      dorzolamide-timoloL (Cosopt) 22.3-6.8 mg/mL ophthalmic solution Administer 1 drop into affected eye(s) twice a day.      ezetimibe (Zetia) 10 mg tablet TAKE 1 TABLET AT BEDTIME 90 tablet 1    hydrocortisone 2.5 % lotion Apply 1 Application topically twice a day.      irbesartan-hydrochlorothiazide (Avalide) 150-12.5 mg tablet Take 1 tablet by mouth once daily. 90 tablet 3    latanoprost (Xalatan) 0.005 % ophthalmic solution Administer 1 drop into both eyes once daily at bedtime.      multivit-min/ferrous fumarate (MULTI VITAMIN ORAL) Take 1 tablet by mouth once daily.      nitroglycerin (Nitrostat) 0.4 mg SL tablet Place 1 tablet (0.4 mg) under the tongue every 5 minutes if needed for chest pain. TAKE PER DIRECTED 25 tablet 3    pentoxifylline (Trental) 400 mg ER tablet Take 1 tablet (400 mg) by mouth 3 times daily (morning, midday, late afternoon).       No current facility-administered medications for this visit.       Review of Systems    REVIEW OF SYSTEMS  GENERAL:  Negative for malaise, significant weight loss, fever  CARDIOVASCULAR: leg swelling   MUSCULOSKELETAL:  Negative for joint pain or swelling, back pain, and muscle pain.  SKIN:  Negative for lesions, rash, and itching  PSYCH:  Negative for sleep disturbance, mood disorder and recent psychosocial stressors  NEURO: Negative, denies any burning, tingling or numbness     Objective: Shuffling gait   Vasc: DP and PT pulses are palpable bilateral.  CFT is less than 3 seconds bilateral.  Skin temperature is warm to cool proximal to distal bilateral.  Some stasis changes noted    Neuro:  Light touch is intact to the foot bilateral.      Derm: Nails 1-5 bilateral are thickened, elongated and crumbly with subungual debris. Skin is supple with normal texture and turgor noted.  Webspaces are clean, dry and intact bilateral.  There are no hyperkeratoses, ulcerations, verruca or other lesions noted.     Ortho: Muscle  strength is 5/5 for all pedal groups tested.  Ankle joint, subtalar joint, 1st MPJ and lesser MPJ ROM is full and without pain or crepitus.  The foot type is rectus bilateral off weight bearing. Hammertoe 5th left toe with callous present medial and lateral toe  Assessment/Plan   Painful nail mycosis  Left 5th hammertoe      Toenails are debrided in length and thickness to avoid infection and for pain relief    Pamela Barraza-Magen, DPM  [id number] [place] [address]

## 2025-04-10 ENCOUNTER — APPOINTMENT (OUTPATIENT)
Dept: PRIMARY CARE | Facility: CLINIC | Age: 85
End: 2025-04-10
Payer: MEDICARE

## 2025-04-10 VITALS
SYSTOLIC BLOOD PRESSURE: 148 MMHG | HEIGHT: 68 IN | BODY MASS INDEX: 19.34 KG/M2 | DIASTOLIC BLOOD PRESSURE: 64 MMHG | WEIGHT: 127.6 LBS | TEMPERATURE: 97.3 F | HEART RATE: 76 BPM

## 2025-04-10 DIAGNOSIS — F41.1 GENERALIZED ANXIETY DISORDER: Primary | ICD-10-CM

## 2025-04-10 PROCEDURE — 1159F MED LIST DOCD IN RCRD: CPT | Performed by: FAMILY MEDICINE

## 2025-04-10 PROCEDURE — 3078F DIAST BP <80 MM HG: CPT | Performed by: FAMILY MEDICINE

## 2025-04-10 PROCEDURE — 1158F ADVNC CARE PLAN TLK DOCD: CPT | Performed by: FAMILY MEDICINE

## 2025-04-10 PROCEDURE — 1036F TOBACCO NON-USER: CPT | Performed by: FAMILY MEDICINE

## 2025-04-10 PROCEDURE — 1160F RVW MEDS BY RX/DR IN RCRD: CPT | Performed by: FAMILY MEDICINE

## 2025-04-10 PROCEDURE — 99213 OFFICE O/P EST LOW 20 MIN: CPT | Performed by: FAMILY MEDICINE

## 2025-04-10 PROCEDURE — 1123F ACP DISCUSS/DSCN MKR DOCD: CPT | Performed by: FAMILY MEDICINE

## 2025-04-10 PROCEDURE — 3077F SYST BP >= 140 MM HG: CPT | Performed by: FAMILY MEDICINE

## 2025-04-10 PROCEDURE — 1170F FXNL STATUS ASSESSED: CPT | Performed by: FAMILY MEDICINE

## 2025-04-10 RX ORDER — BUSPIRONE HYDROCHLORIDE 5 MG/1
5 TABLET ORAL 2 TIMES DAILY
Qty: 60 TABLET | Refills: 1 | Status: SHIPPED | OUTPATIENT
Start: 2025-04-10 | End: 2026-04-10

## 2025-04-10 ASSESSMENT — ACTIVITIES OF DAILY LIVING (ADL)
TAKING_MEDICATION: INDEPENDENT
MANAGING_FINANCES: INDEPENDENT
GROCERY_SHOPPING: INDEPENDENT
DOING_HOUSEWORK: INDEPENDENT
DRESSING: INDEPENDENT
BATHING: INDEPENDENT

## 2025-04-10 ASSESSMENT — PATIENT HEALTH QUESTIONNAIRE - PHQ9
SUM OF ALL RESPONSES TO PHQ9 QUESTIONS 1 AND 2: 0
1. LITTLE INTEREST OR PLEASURE IN DOING THINGS: NOT AT ALL
2. FEELING DOWN, DEPRESSED OR HOPELESS: NOT AT ALL

## 2025-04-10 NOTE — PROGRESS NOTES
Subjective Betty Dwyer is scheduled for a annual wellness visit.  However her last annual wellness visit was 6 months ago in October 2024.  This visit is converted to a routine office visit follow-up.  She states that she has overall been feeling well.  She complains of ongoing anxiety that she has had for many years she is requesting a refill on alprazolam that she uses as needed.  She does note nocturia 2-3 times per night with difficulty falling back asleep.  This has been an ongoing problem as well for many years.  She had consulted urology about this in the past.  She relates episodes of blurred vision that occur intermittently but always at 4:00 in the afternoon.  They last for about an hour.  She does put in a series of eyedrops during the day and she does use Cosopt about an hour before these episodes occur.  There are no other associated symptoms.  She does see an ophthalmologist on a regular basis.  She continues on her meds noted.    Patient ID: Betty Stein is a 85 y.o. female who presents for Medicare Annual Wellness Visit Initial (Awv/):    Problem List Items Addressed This Visit    None     Past Medical History:   Diagnosis Date    Encounter for examination for normal comparison and control in clinical research program 01/21/2019    Research exam    Inflamed seborrheic keratosis 05/14/2020    Seborrheic keratoses, inflamed    Ingrowing nail 04/15/2021    Ingrown toenail    Nonrheumatic aortic (valve) stenosis 05/10/2019    Aortic stenosis, severe    Personal history of other diseases of the musculoskeletal system and connective tissue 11/11/2021    History of bone disorder      Past Surgical History:   Procedure Laterality Date    OTHER SURGICAL HISTORY  06/29/2018    Hip Replacement Bilateral    OTHER SURGICAL HISTORY  11/11/2021    Surgery    OTHER SURGICAL HISTORY  11/11/2021    Cataract surgery    OTHER SURGICAL HISTORY  11/11/2021    Appendectomy    OTHER SURGICAL HISTORY  11/11/2021    Cardiac  catheterization    OTHER SURGICAL HISTORY  11/11/2021    Transcatheter aortic valve replacement    OTHER SURGICAL HISTORY  11/11/2021    Percutaneous transluminal coronary angioplasty    OTHER SURGICAL HISTORY  11/11/2021    Phacoemulsification of cataract and insertion of intraocular lens    OTHER SURGICAL HISTORY  11/18/2021    Colonoscopy      Family History   Problem Relation Name Age of Onset    Coronary artery disease Mother      No Known Problems Father        Social History     Socioeconomic History    Marital status:      Spouse name: Not on file    Number of children: Not on file    Years of education: Not on file    Highest education level: Not on file   Occupational History    Not on file   Tobacco Use    Smoking status: Never     Passive exposure: Never    Smokeless tobacco: Never   Vaping Use    Vaping status: Never Used   Substance and Sexual Activity    Alcohol use: Not Currently     Alcohol/week: 1.0 standard drink of alcohol     Types: 1 Standard drinks or equivalent per week     Comment: Gave up the 1 drink    Drug use: Never    Sexual activity: Defer   Other Topics Concern    Not on file   Social History Narrative    Not on file     Social Drivers of Health     Financial Resource Strain: Not on file   Food Insecurity: Not on file   Transportation Needs: Not on file   Physical Activity: Not on file   Stress: Not on file   Social Connections: Not on file   Intimate Partner Violence: Not on file   Housing Stability: Not on file      Patient has no known allergies.   Current Outpatient Medications   Medication Sig Dispense Refill    ALPRAZolam (Xanax) 0.25 mg tablet Take 1 tablet (0.25 mg) by mouth 2 times a day as needed for anxiety. 20 tablet 0    amLODIPine (Norvasc) 5 mg tablet Take 1 tablet (5 mg) by mouth once daily. 90 tablet 3    aspirin 81 mg EC tablet Take 1 tablet (81 mg) by mouth once daily. 90 tablet 3    atorvastatin (Lipitor) 20 mg tablet TAKE 1 TABLET AT BEDTIME 90 tablet 1     cholecalciferol (Vitamin D-3) 25 MCG (1000 UT) capsule Take 1 capsule (25 mcg) by mouth once daily.      dorzolamide-timoloL (Cosopt) 22.3-6.8 mg/mL ophthalmic solution Administer 1 drop into affected eye(s) twice a day.      ezetimibe (Zetia) 10 mg tablet TAKE 1 TABLET AT BEDTIME 90 tablet 1    hydrocortisone 2.5 % lotion Apply 1 Application topically twice a day.      irbesartan-hydrochlorothiazide (Avalide) 150-12.5 mg tablet Take 1 tablet by mouth once daily. 90 tablet 3    latanoprost (Xalatan) 0.005 % ophthalmic solution Administer 1 drop into both eyes once daily at bedtime.      multivit-min/ferrous fumarate (MULTI VITAMIN ORAL) Take 1 tablet by mouth once daily.      nitroglycerin (Nitrostat) 0.4 mg SL tablet Place 1 tablet (0.4 mg) under the tongue every 5 minutes if needed for chest pain. TAKE PER DIRECTED 25 tablet 3    pentoxifylline (Trental) 400 mg ER tablet Take 1 tablet (400 mg) by mouth 3 times daily (morning, midday, late afternoon).       No current facility-administered medications for this visit.       Immunization History   Administered Date(s) Administered    Flu vaccine, quadrivalent, high-dose, preservative free, age 65y+ (FLUZONE) 09/22/2023    Flu vaccine, trivalent, preservative free, HIGH-DOSE, age 65y+ (Fluzone) 09/06/2013, 10/04/2016, 10/02/2017, 09/21/2018, 09/06/2019, 08/27/2020    Influenza, Unspecified 10/15/2009, 09/23/2011, 10/01/2012, 10/01/2013, 09/01/2014, 09/01/2015, 10/01/2016, 10/01/2017, 09/01/2019, 10/14/2021    Influenza, seasonal, injectable 09/02/2014, 10/17/2014, 09/05/2015    Moderna COVID-19 vaccine, 12 years and older (50mcg/0.5mL)(Spikevax) 10/30/2024    Novel influenza-H1N1-09, preservative-free 01/11/2010    Pfizer Gray Cap SARS-CoV-2 10/15/2023    Pfizer Purple Cap SARS-CoV-2 01/26/2021, 02/15/2021, 09/27/2021, 09/16/2022    Pneumococcal conjugate vaccine, 13-valent (PREVNAR 13) 11/24/2015    Pneumococcal polysaccharide vaccine, 23-valent, age 2 years and  older (PNEUMOVAX 23) 10/01/2012, 10/17/2014    RSV, 60 Years And Older (AREXVY) 02/27/2024, 11/19/2024    SARS-CoV-2, Unspecified 04/30/2022    Td (adult), unspecified 06/03/2000    Zoster vaccine, recombinant, adult (SHINGRIX) 02/26/2020, 07/23/2020    Zoster, Unspecified 07/28/2000    Zoster, live 11/27/2007        Review of Systems   Eyes:  Positive for visual disturbance.   Genitourinary:  Positive for frequency.   Musculoskeletal:  Positive for back pain.   Psychiatric/Behavioral:  The patient is nervous/anxious.    All other systems reviewed and are negative.       Vitals:    04/10/25 1100   BP: 148/64   Pulse: 76   Temp: 36.3 °C (97.3 °F)     Vitals:    04/10/25 1100   Weight: 57.9 kg (127 lb 9.6 oz)      Physical Exam  Constitutional:       General: She is not in acute distress.     Appearance: Normal appearance.   Neurological:      General: No focal deficit present.      Mental Status: She is alert and oriented to person, place, and time. Mental status is at baseline.   Psychiatric:         Mood and Affect: Mood normal.         Thought Content: Thought content normal.         Judgment: Judgment normal.          ASSESSMENT/PLAN: Chronic anxiety disorder.  We discussed in detail the need to discontinue Xanax because of potential side effects.  She did have a falling episode several months ago.  She also has had an unsteady gait.  Will try buspirone 5 mg twice daily.  Patient will call in 1 month or follow-up in 1 month for reevaluation.  Patient is also scheduled for physical therapy evaluation as well.    Hypertension with slightly elevated reading.  Continue current meds    Chronic nocturia.  Recommended patient follow-up with urology    Recurrent visual disturbance.  Advised patient to contact her ophthalmologist for an evaluation of this.    History of coronary artery disease followed by cardiology    Hyperlipidemia.  Continue atorvastatin daily.    Need to discuss Tdap immunization update at next office  visit  Follow-up in 4 to 6 months and call as needed           Scribe Attestation  By signing my name below, I, Meera Gautam LPN, Scribe   attest that this documentation has been prepared under the direction and in the presence of Ronald Lowry MD.

## 2025-04-11 ASSESSMENT — ENCOUNTER SYMPTOMS
BACK PAIN: 1
FREQUENCY: 1
NERVOUS/ANXIOUS: 1

## 2025-04-29 NOTE — PROGRESS NOTES
"Physical Therapy    Physical Therapy Evaluation and Treatment      Patient Name: Betty Stein  MRN: [id number]  Today's Date: 4/30/2025    Time Entry:   Time Calculation  Start Time: 1145  Stop Time: 1250  Time Calculation (min): 65 min  PT Evaluation Time Entry  PT Evaluation (Low) Time Entry: 65 2025 20% COINS, 150 DED (MET) 1200 OOP MAX, VS MED NEC, 1 EVAL  DAYS, MC 90 DAY 7/30/2025, NO AUTH     Assessment:  PT Assessment  PT Assessment Results: Decreased strength, Decreased range of motion, Decreased endurance, Impaired balance, Decreased mobility, Pain  Rehab Prognosis: Good   Betty Stein presents to physical therapy today with chronic cautious gait and unsteadiness on feet. Pt ambulates with short step length and slow gait speed without device. She requires increased time to complete both 5XSTS and TUG testing today, indicating she is elevated risk for falls. She is also unable to perform STS without UE support. Pt also scores below fall cutoff score for FGA. She will benefit from PT to address these concerns.     Plan:  OP PT Plan  Treatment/Interventions: Cryotherapy, Education/ Instruction, Gait training, Manual therapy, Neuromuscular re-education, Therapeutic activities, Therapeutic exercises, Self care/ home management  PT Plan: Skilled PT  PT Frequency: 2 times per week  Duration: 10 visits  Certification Period Start Date: 04/30/25  Certification Period End Date: 07/29/25  Rehab Potential: Good  Plan of Care Agreement: Patient    Current Problem:   1. Balance disorder  Follow Up In Physical Therapy      2. Gait disturbance  Referral to Physical Therapy    Follow Up In Physical Therapy      3. Chronic low back pain  Follow Up In Physical Therapy          Subjective    General:  General  Reason for Referral: R26.9 (ICD-10-CM) - Gait disturbance  Referred By: Amisha Phelps MD  Pt, \"Betty Dwyer\", arrives at session with her , Kapil, with chief complaint of " "unsteadiness on feet. Reports this has been ongoing for at least a couple of years.  Of note, pt reports she is feeling stressed today due to a family member going to the hospital, and therefore feels \"very off today.\" Also did not sleep well last night.    Her balance has impacted her ability to walk on uneven surfaces.     Falls/STEADI: denies falls in the last calendar year.   --Last fall in 2/2024: feel asleep in living room chair and stood up to put her slipper on her R foot while she was not holding onto anything and had items in her L hand/arm. Reports she had bruising on her R side as she fell onto the chair when she fell. Reports she was able to bring herself back up to standing.     Pain #: current: 9/10; at worst: 9/10  Location: low back- reports has been told she has had tailbone fractures in 3 places in past imaging  Descriptor: \"aching\"   Better: Tylenol, lying in supine  Worse: unable to report   Numbness/tingling: denies.   Difficult Activities: walking to the car without her 's support, walking outdoors- due to prior slip on ice, walking on uneven surfaces & compliant surfaces, walking longer distances   Goals: \"To work on my balance and to be able to walk outside again.\"   PMH/surgical history: B THRs in 2015; degenerative disc disease of LBP- has had injections, Aortic stenosis as a result of Rheumatic Fever in youth, cardiac stent, CAD- s/p PTCA, s/p TAVR, HTN, osteopenia, anemia, CHRISTIANO, glaucoma    Precautions:  Precautions  STEADI Fall Risk Score (The score of 4 or more indicates an increased risk of falling): 7  Vital Signs:  Vital Signs  BP: 155/60  BP Location: Right arm  BP Method: Manual  Patient Position: Sitting  Pain:  Pain Assessment  Pain Assessment: 0-10  0-10 (Numeric) Pain Score: 9  Pain Location: Back  Pain Orientation: Lower  Pain Descriptors: Aching  Home Living:   Pt lives at a senior living facility: [facility]. Has lived there for a little over 1 year.   Prior " Level of Function:   Able to perform various household tasks and self care at present. Has been ~    Objective   Cognition:   WFL.   General Assessments:  Posture:   Flexed trunk, fwd head, thoracic kyphosis, rounded shoulders    Range of Motion:   UE & LE AROM WFL B.     Strength:   Formal MMT not performed, however strength assessed through functional means.     Transfers:   Sit <> stand: MOD I    Gait:   Pt ambulated without device with SBA to supervision demonstrating short step length, slow gait speed, flexed trunk posturing, without trunk rotation. Pt does demo ability to walk at faster speeds and longer step length during FGA.     Stairs:   Pt ascended/descended 5 steps with B rails with supervision with reciprocating pattern.     Balance:   Tandem walk: unable  Walking with head turns: decreases speed and path deviation    Outcome Measures:  FGA - Functional Gait Assessment  Gait level surface: 1  Change in gait speed: 2  Gait with horizontal head turns: 2  Gait with vertical head turns: 3  Gait and pivot turn: 3  Step over obstacle: 0  Gait with narrow base of support: 0  Gait with eyes closed: 0  Ambulating backwards: 1  Steps: 2  FGA Total Score: 14    Timed Up and Go Test  How many seconds did it take to complete the 5 tasks?: 24.76 seconds (without device)    Other Measures  5x Sit to Stand: 21.32 (with B UE support)  Activities - Specific Balance Confidence Scale: 600 (37.5% of self confidence)     EDUCATION:  Outpatient Education  Individual(s) Educated: Patient, Spouse  Education Provided: Body Mechanics, Fall Risk, Home Exercise Program, POC, Posture  Risk and Benefits Discussed with Patient/Caregiver/Other: yes  Patient/Caregiver Demonstrated Understanding: yes  Plan of Care Discussed and Agreed Upon: yes  Patient Response to Education: Patient/Caregiver Verbalized Understanding of Information    Goals:  By discharge, pt will meet the following goals:     Pt will demonstrate independence with home  "exercise program.  Pt will tolerate increased exercise without adverse reaction.   Pt will decrease time of 5XSTS by 4 seconds.   Pt will decrease time of TUG test by 7 seconds.   Pt will improve score of ABC scale by 20%.  Pt will increase score of FGA by 4 points to meet MCID.   Pt will score at or above cutoff for ERWIN.   Pt will demo tandem stance to 10 seconds B without LOB.   Pt will demo ability to perform STS without UE support.   Pt will report decrease in pain by 2 points to meet MCID.  Pt will meet self-reported goal of: \"To be able to walk outside again.\"       "

## 2025-04-30 ENCOUNTER — EVALUATION (OUTPATIENT)
Dept: PHYSICAL THERAPY | Facility: CLINIC | Age: 85
End: 2025-04-30
Payer: MEDICARE

## 2025-04-30 VITALS — DIASTOLIC BLOOD PRESSURE: 60 MMHG | SYSTOLIC BLOOD PRESSURE: 155 MMHG

## 2025-04-30 DIAGNOSIS — R26.9 GAIT DISTURBANCE: ICD-10-CM

## 2025-04-30 DIAGNOSIS — R26.89 BALANCE DISORDER: Primary | ICD-10-CM

## 2025-04-30 DIAGNOSIS — G89.29 CHRONIC LOW BACK PAIN: ICD-10-CM

## 2025-04-30 DIAGNOSIS — M54.50 CHRONIC LOW BACK PAIN: ICD-10-CM

## 2025-04-30 PROCEDURE — 97161 PT EVAL LOW COMPLEX 20 MIN: CPT | Mod: GP

## 2025-04-30 ASSESSMENT — ENCOUNTER SYMPTOMS
LOSS OF SENSATION IN FEET: 0
OCCASIONAL FEELINGS OF UNSTEADINESS: 1
DEPRESSION: 0

## 2025-04-30 ASSESSMENT — PAIN - FUNCTIONAL ASSESSMENT: PAIN_FUNCTIONAL_ASSESSMENT: 0-10

## 2025-04-30 ASSESSMENT — PAIN DESCRIPTION - DESCRIPTORS: DESCRIPTORS: ACHING

## 2025-04-30 ASSESSMENT — PAIN SCALES - GENERAL: PAINLEVEL_OUTOF10: 9

## 2025-05-14 ENCOUNTER — APPOINTMENT (OUTPATIENT)
Dept: PHYSICAL THERAPY | Facility: CLINIC | Age: 85
End: 2025-05-14
Payer: MEDICARE

## 2025-05-14 DIAGNOSIS — R26.9 GAIT DISTURBANCE: ICD-10-CM

## 2025-05-14 DIAGNOSIS — R26.89 BALANCE DISORDER: Primary | ICD-10-CM

## 2025-05-14 DIAGNOSIS — G89.29 CHRONIC LOW BACK PAIN: ICD-10-CM

## 2025-05-14 DIAGNOSIS — M54.50 CHRONIC LOW BACK PAIN: ICD-10-CM

## 2025-05-21 ENCOUNTER — TREATMENT (OUTPATIENT)
Dept: PHYSICAL THERAPY | Facility: CLINIC | Age: 85
End: 2025-05-21
Payer: MEDICARE

## 2025-05-21 DIAGNOSIS — G89.29 CHRONIC LOW BACK PAIN: ICD-10-CM

## 2025-05-21 DIAGNOSIS — M54.50 CHRONIC LOW BACK PAIN: ICD-10-CM

## 2025-05-21 DIAGNOSIS — R26.89 BALANCE DISORDER: Primary | ICD-10-CM

## 2025-05-21 DIAGNOSIS — R26.9 GAIT DISTURBANCE: ICD-10-CM

## 2025-05-21 PROCEDURE — 97110 THERAPEUTIC EXERCISES: CPT | Mod: GP,CQ

## 2025-05-21 PROCEDURE — 97112 NEUROMUSCULAR REEDUCATION: CPT | Mod: GP,CQ

## 2025-05-21 NOTE — PROGRESS NOTES
"Physical Therapy Treatment      Patient Name: Betty Stein  MRN: [id number]  Today's Date: 5/21/2025  Visit #2  Time Calculation  Start Time: 1115  Stop Time: 1213  Time Calculation (min): 58 min    Insurance:  2025 20% COINS, 150 DED (MET) 1200 OOP MAX, VS MED NEC, 1 EVAL  DAYS, MC 90 DAY 7/30/2025, NO AUTH   Assessment:  Pt. Tolerated session well today.  Issued a HEP to work on lower extremity strengthening and balance.  Encouraged pt. To con't with P.T. so we can work on her confidence with balance and walking,  she prefers a home program to work on instead.  She does have more visits scheduled and we will con't to work on dynamic and static balance challenges.     Patient's response to session: Increased knowledge and understanding    Plan:  Continue per POC.    Current Problem:   1. Balance disorder  Follow Up In Physical Therapy      2. Gait disturbance  Follow Up In Physical Therapy      3. Chronic low back pain  Follow Up In Physical Therapy          Subjective   Pt. States she is feeling \"uptight and nervous\" today.     Precautions:  Precautions  STEADI Fall Risk Score (The score of 4 or more indicates an increased risk of falling): 7         Objective     Treatments:  Therapeutic Exercise (28722): 20 minutes  Issued the following HEP:  Performed heel/toe raises  x 10 reps   Standing hip abduction x 10 reps  Mod tandem stand EO      Neuromuscular Re-education (04875): 38 minutes  Walking the track x 2 laps with cga and verbal cues for heel strike.  Alternate toe taps to 4 inch step with fingertip support, pt. Fearful to try without UE support  Supported SLS on 4 inch step without UE support.  High marching and retro walking without UE support along // bar multiple laps of each.  Side stepping without UE support.  Foam pad: normal stance, NBOS and mod tandem stand    Education and discussion on HEP and treatment regarding the benefits related to current condition, POC, pathophysiology, and " precautions    *added to HEP

## 2025-05-23 ENCOUNTER — TREATMENT (OUTPATIENT)
Dept: PHYSICAL THERAPY | Facility: CLINIC | Age: 85
End: 2025-05-23
Payer: MEDICARE

## 2025-05-23 DIAGNOSIS — R26.9 GAIT DISTURBANCE: ICD-10-CM

## 2025-05-23 DIAGNOSIS — G89.29 CHRONIC LOW BACK PAIN: ICD-10-CM

## 2025-05-23 DIAGNOSIS — M54.50 CHRONIC LOW BACK PAIN: ICD-10-CM

## 2025-05-23 DIAGNOSIS — R26.89 BALANCE DISORDER: Primary | ICD-10-CM

## 2025-05-23 PROCEDURE — 97110 THERAPEUTIC EXERCISES: CPT | Mod: GP,CQ

## 2025-05-23 PROCEDURE — 97112 NEUROMUSCULAR REEDUCATION: CPT | Mod: GP,CQ

## 2025-05-23 NOTE — PROGRESS NOTES
Physical Therapy Treatment      Patient Name: Betty Stein  MRN: [id number]  Today's Date: 5/23/2025  Visit #3  Time Calculation  Start Time: 1033  Stop Time: 1118  Time Calculation (min): 45 min    Insurance:  2025 20% COINS, 150 DED (MET) 1200 OOP MAX, VS MED NEC, 1 EVAL  DAYS, MC 90 DAY 7/30/2025, NO AUTH   Assessment:  Pt. Tolerated session well, she improves with her walking when she remembers heel strike and taking longer steps.  No balance loss on the track with head turns or stop and go walking.  Pt. Fearful when attempting to step over hurdles and sticks but she was eventually able to complete the sticks. Decreased clearance at times, stepping on the sticks.  Pt. Reports compliance with home program.  Pt. Will con't to benefit from P.T. progressing towards goals.   Patient's response to session: Increased knowledge and understanding    Plan:  Continue per POC.    Current Problem:   1. Balance disorder  Follow Up In Physical Therapy      2. Gait disturbance  Follow Up In Physical Therapy      3. Chronic low back pain  Follow Up In Physical Therapy          Subjective   Pt. States My head is swimming, I didn't sleep well last night.  Arrived to session with her  Kaipl.       Precautions:  Precautions  STEADI Fall Risk Score (The score of 4 or more indicates an increased risk of falling): 7         Objective     Treatments:  Therapeutic Exercise (17778): 15 minutes  Reviewed and performed  the following HEP with cues on technique.  Performed heel/toe raises  x 10 reps   Standing hip abduction x 10 reps  Mod tandem stand EO      Neuromuscular Re-education (19989): 30 minutes  Walking the track x 2 laps with cga and verbal cues for heel strike, performed horizontal head turns and vertical head nods with no balance loss.  Stop and go walking without difficulty.  Attempted higher hurdles and pt. Very fearful to step over without UE support, switched to lower sticks  in which pt. Fearful but she was  able to complete with encouragement doing a step to gait pattern and holding a ball to discourage using the wall.  High marching and retro walking in 20 ft hallway with no balance loss, cues for longer steps with retro walking.  Side stepping without UE support.      Education and discussion on HEP and treatment regarding the benefits related to current condition, POC, pathophysiology, and precautions    *added to HEP

## 2025-05-29 ENCOUNTER — APPOINTMENT (OUTPATIENT)
Dept: PODIATRY | Facility: CLINIC | Age: 85
End: 2025-05-29
Payer: MEDICARE

## 2025-05-29 DIAGNOSIS — B35.1 ONYCHOMYCOSIS: Primary | ICD-10-CM

## 2025-05-29 DIAGNOSIS — M79.674 PAIN IN TOES OF BOTH FEET: ICD-10-CM

## 2025-05-29 DIAGNOSIS — M79.675 PAIN IN TOES OF BOTH FEET: ICD-10-CM

## 2025-05-29 PROCEDURE — 11721 DEBRIDE NAIL 6 OR MORE: CPT | Performed by: PODIATRIST

## 2025-05-29 NOTE — PROGRESS NOTES
History Of Present Illness  Betty Stein is a 85 y.o. female presenting with painful elongated nails.     PCP Ronald Lowry MD  Last visit 4/11/25     Past Medical History  She has a past medical history of Encounter for examination for normal comparison and control in clinical research program (01/21/2019), Inflamed seborrheic keratosis (05/14/2020), Ingrowing nail (04/15/2021), Nonrheumatic aortic (valve) stenosis (05/10/2019), and Personal history of other diseases of the musculoskeletal system and connective tissue (11/11/2021).    Surgical History  She has a past surgical history that includes Other surgical history (06/29/2018); Other surgical history (11/11/2021); Other surgical history (11/11/2021); Other surgical history (11/11/2021); Other surgical history (11/11/2021); Other surgical history (11/11/2021); Other surgical history (11/11/2021); Other surgical history (11/11/2021); and Other surgical history (11/18/2021).     Social History  She reports that she has never smoked. She has never been exposed to tobacco smoke. She has never used smokeless tobacco. She reports that she does not currently use alcohol after a past usage of about 1.0 standard drink of alcohol per week. She reports that she does not use drugs.    Family History  Family History   Problem Relation Name Age of Onset    Coronary artery disease Mother      No Known Problems Father          Allergies  Patient has no known allergies.    Medications  Current Outpatient Medications   Medication Sig Dispense Refill    ALPRAZolam (Xanax) 0.25 mg tablet Take 1 tablet (0.25 mg) by mouth 2 times a day as needed for anxiety. 20 tablet 0    amLODIPine (Norvasc) 5 mg tablet Take 1 tablet (5 mg) by mouth once daily. 90 tablet 3    aspirin 81 mg EC tablet Take 1 tablet (81 mg) by mouth once daily. 90 tablet 3    atorvastatin (Lipitor) 20 mg tablet TAKE 1 TABLET AT BEDTIME 90 tablet 1    busPIRone (Buspar) 5 mg tablet Take 1 tablet (5 mg) by mouth  2 times a day. 60 tablet 1    cholecalciferol (Vitamin D-3) 25 MCG (1000 UT) capsule Take 1 capsule (25 mcg) by mouth once daily.      dorzolamide-timoloL (Cosopt) 22.3-6.8 mg/mL ophthalmic solution Administer 1 drop into affected eye(s) twice a day.      ezetimibe (Zetia) 10 mg tablet TAKE 1 TABLET AT BEDTIME 90 tablet 1    hydrocortisone 2.5 % lotion Apply 1 Application topically twice a day.      irbesartan-hydrochlorothiazide (Avalide) 150-12.5 mg tablet Take 1 tablet by mouth once daily. 90 tablet 3    latanoprost (Xalatan) 0.005 % ophthalmic solution Administer 1 drop into both eyes once daily at bedtime.      multivit-min/ferrous fumarate (MULTI VITAMIN ORAL) Take 1 tablet by mouth once daily.      nitroglycerin (Nitrostat) 0.4 mg SL tablet Place 1 tablet (0.4 mg) under the tongue every 5 minutes if needed for chest pain. TAKE PER DIRECTED 25 tablet 3    pentoxifylline (Trental) 400 mg ER tablet Take 1 tablet (400 mg) by mouth 3 times daily (morning, midday, late afternoon).       No current facility-administered medications for this visit.       Review of Systems    REVIEW OF SYSTEMS  GENERAL:  Negative for malaise, significant weight loss, fever  CARDIOVASCULAR: leg swelling   MUSCULOSKELETAL:  Negative for joint pain or swelling, back pain, and muscle pain.  SKIN:  Negative for lesions, rash, and itching  PSYCH:  Negative for sleep disturbance, mood disorder and recent psychosocial stressors  NEURO: Negative, denies any burning, tingling or numbness     Objective: Shuffling gait   Vasc: DP and PT pulses are palpable bilateral.  CFT is less than 3 seconds bilateral.  Skin temperature is warm to cool proximal to distal bilateral.  Some stasis changes noted    Neuro:  Light touch is intact to the foot bilateral.  No clonus     Derm: Nails 1-5 bilateral are thickened, elongated and crumbly with subungual debris. Skin is supple with normal texture and turgor noted.  Webspaces are clean, dry and intact  bilateral.  There are no hyperkeratoses, ulcerations, verruca or other lesions noted.     Ortho: Muscle strength is 5/5 for all pedal groups tested.  Ankle joint, subtalar joint, 1st MPJ and lesser MPJ ROM is full and without pain or crepitus.  The foot type is rectus bilateral off weight bearing. Hammertoe 5th left toe with callous present medial and lateral toe  Assessment/Plan   Painful nail mycosis  Left 5th hammertoe      Toenails are debrided in length and thickness to avoid infection and for pain relief    Pamela Barraza-Magen, DPM  [address] [place] [address]

## 2025-06-03 ENCOUNTER — PATIENT MESSAGE (OUTPATIENT)
Dept: PRIMARY CARE | Facility: CLINIC | Age: 85
End: 2025-06-03
Payer: MEDICARE

## 2025-06-04 ENCOUNTER — APPOINTMENT (OUTPATIENT)
Dept: PHYSICAL THERAPY | Facility: CLINIC | Age: 85
End: 2025-06-04
Payer: MEDICARE

## 2025-06-04 DIAGNOSIS — R26.89 BALANCE DISORDER: Primary | ICD-10-CM

## 2025-06-04 DIAGNOSIS — R26.9 GAIT DISTURBANCE: ICD-10-CM

## 2025-06-04 DIAGNOSIS — M54.50 CHRONIC LOW BACK PAIN: ICD-10-CM

## 2025-06-04 DIAGNOSIS — G89.29 CHRONIC LOW BACK PAIN: ICD-10-CM

## 2025-06-05 NOTE — PROGRESS NOTES
"Physical Therapy    Physical Therapy Treatment    Patient Name: Btety Stein  MRN: [id number]  Today's Date: 6/6/2025    Time Entry:   Time Calculation  Start Time: 1121  Stop Time: 1215  Time Calculation (min): 54 min     PT Therapeutic Procedures Time Entry  Neuromuscular Re-Education Time Entry: 54                 Visit #4 of 10  Insurance:  2025 20% COINS, 150 DED (MET) 1200 OOP MAX, VS MED NEC, 1 EVAL  DAYS, MC 90 DAY 7/29/2025, NO AUTH     Assessment:   Pt tolerated session well without adverse effect and is making slow progress toward goals. Pt does appear to be walking at faster speeds. Much discussion at start of session regarding follow ups, and encouraged pt to continue with consistent PT follow up due to difficulty providing pt with adequate intensity balance exercises that can safely be performed at home. Discussed graded exposure to medical appts. Frequently requires cues to widen step length during session. Pt fatigues rapidly during session. Tends to stand with excessive arched low back position. Pt will continue to benefit from PT to address her static & dynamic balance, posture, and activity tolerance.     Plan:   Continue with similar activities.     Current Problem  1. Balance disorder  Follow Up In Physical Therapy      2. Gait disturbance  Follow Up In Physical Therapy      3. Chronic low back pain  Follow Up In Physical Therapy        General   Pt arrives at session with her , Kapil, reporting she feels she is \"spastic\" today, in which she awoke this way. Reports she took her BP earlier which was a little elevated. Reports she has noticed she is more anxious lately with appointments, which she feels is due to having to meet a time commitment.        Subjective    Precautions  Precautions  STEADI Fall Risk Score (The score of 4 or more indicates an increased risk of falling): 7  Vital Signs     Pain  Pain Assessment  Pain Assessment: 0-10  0-10 (Numeric) Pain Score:  (does not " "rank on VAS)    Objective   Treatments:  Current HEP:  Heel/toe raises  Standing hip ABD  Mod tandem stance    Neuromuscular Re-education (09114): 54 minutes  Dynamic Gait: (performed along level surface track)  Horizontal & vertical head turns: performed by way of playing eye spy  Fast/slow/stop/go    In // bars:   Stepping over 2, yardsticks  Stepping over 2\" sticks  Comments: multiple reps, cues to increase size of steps. Performed anteriorly & laterally    Cable column walkouts x1 rep at 2.5# resistance x10; progressed to 5# x5 reps AP & x2 reps each laterally    Pt walked up/down hallway x140ft    Pt stood in front of mirror, cues to address erect posturing, as she tends to stand with weight shift over heels and arching her back excessively    OP EDUCATION:       Goals:  By discharge, pt will meet the following goals:      Pt will demonstrate independence with home exercise program.  Pt will tolerate increased exercise without adverse reaction.   Pt will decrease time of 5XSTS by 4 seconds.   Pt will decrease time of TUG test by 7 seconds.   Pt will improve score of ABC scale by 20%.  Pt will increase score of FGA by 4 points to meet MCID.   Pt will score at or above cutoff for ERWIN.   Pt will demo tandem stance to 10 seconds B without LOB.   Pt will demo ability to perform STS without UE support.   Pt will report decrease in pain by 2 points to meet MCID.  Pt will meet self-reported goal of: \"To be able to walk outside again.\"   "

## 2025-06-06 ENCOUNTER — TREATMENT (OUTPATIENT)
Dept: PHYSICAL THERAPY | Facility: CLINIC | Age: 85
End: 2025-06-06
Payer: MEDICARE

## 2025-06-06 DIAGNOSIS — R26.9 GAIT DISTURBANCE: ICD-10-CM

## 2025-06-06 DIAGNOSIS — G89.29 CHRONIC LOW BACK PAIN: ICD-10-CM

## 2025-06-06 DIAGNOSIS — M54.50 CHRONIC LOW BACK PAIN: ICD-10-CM

## 2025-06-06 DIAGNOSIS — R26.89 BALANCE DISORDER: Primary | ICD-10-CM

## 2025-06-06 PROCEDURE — 97112 NEUROMUSCULAR REEDUCATION: CPT | Mod: GP

## 2025-06-06 ASSESSMENT — PAIN - FUNCTIONAL ASSESSMENT: PAIN_FUNCTIONAL_ASSESSMENT: 0-10

## 2025-06-11 ENCOUNTER — APPOINTMENT (OUTPATIENT)
Dept: PHYSICAL THERAPY | Facility: CLINIC | Age: 85
End: 2025-06-11
Payer: MEDICARE

## 2025-06-11 DIAGNOSIS — G89.29 CHRONIC LOW BACK PAIN: ICD-10-CM

## 2025-06-11 DIAGNOSIS — M54.50 CHRONIC LOW BACK PAIN: ICD-10-CM

## 2025-06-11 DIAGNOSIS — R26.89 BALANCE DISORDER: Primary | ICD-10-CM

## 2025-06-11 DIAGNOSIS — R26.9 GAIT DISTURBANCE: ICD-10-CM

## 2025-06-12 NOTE — PROGRESS NOTES
"Physical Therapy    Physical Therapy Treatment    Patient Name: Betty Stein  MRN: [id number]  Today's Date: 6/12/2025    Time Entry:                           Visit #5 of 10  Insurance:  2025 20% COINS, 150 DED (MET) 1200 OOP MAX, VS MED NEC, 1 EVAL  DAYS, MC 90 DAY 7/29/2025, NO AUTH     Assessment:   Pt tolerated session well without adverse effect and is making slow progress toward goals.     Pt does appear to be walking at faster speeds. Much discussion at start of session regarding follow ups, and encouraged pt to continue with consistent PT follow up due to difficulty providing pt with adequate intensity balance exercises that can safely be performed at home. Discussed graded exposure to medical appts. Frequently requires cues to widen step length during session. Pt fatigues rapidly during session. Tends to stand with excessive arched low back position. Pt will continue to benefit from PT to address her static & dynamic balance, posture, and activity tolerance.     Plan:   Continue with similar activities.     Current Problem  1. Balance disorder        2. Gait disturbance        3. Chronic low back pain          General   Pt arrives at session with her , Kapil, reporting   Continue to perform stepping over activities and cable column walkouts ***     she feels she is \"spastic\" today, in which she awoke this way. Reports she took her BP earlier which was a little elevated. Reports she has noticed she is more anxious lately with appointments, which she feels is due to having to meet a time commitment.        Subjective    Precautions     Vital Signs     Pain       Objective   Treatments:  Current HEP:  Heel/toe raises  Standing hip ABD  Mod tandem stance    Neuromuscular Re-education (29895): 54 minutes  Dynamic Gait: (performed along level surface track)  Horizontal & vertical head turns: performed by way of playing eye spy  Fast/slow/stop/go    In // bars:   Stepping over 2, " "yardsticks  Stepping over 2\" sticks  Comments: multiple reps, cues to increase size of steps. Performed anteriorly & laterally    ***   Cable column walkouts x1 rep at 2.5# resistance x10; progressed to 5# x5 reps AP & x2 reps each laterally    Pt walked up/down hallway x140ft    Pt stood in front of mirror, cues to address erect posturing, as she tends to stand with weight shift over heels and arching her back excessively    OP EDUCATION:       Goals:  By discharge, pt will meet the following goals:      Pt will demonstrate independence with home exercise program.  Pt will tolerate increased exercise without adverse reaction.   Pt will decrease time of 5XSTS by 4 seconds.   Pt will decrease time of TUG test by 7 seconds.   Pt will improve score of ABC scale by 20%.  Pt will increase score of FGA by 4 points to meet MCID.   Pt will score at or above cutoff for ERWIN.   Pt will demo tandem stance to 10 seconds B without LOB.   Pt will demo ability to perform STS without UE support.   Pt will report decrease in pain by 2 points to meet MCID.  Pt will meet self-reported goal of: \"To be able to walk outside again.\"   "

## 2025-06-13 ENCOUNTER — APPOINTMENT (OUTPATIENT)
Dept: PHYSICAL THERAPY | Facility: CLINIC | Age: 85
End: 2025-06-13
Payer: MEDICARE

## 2025-06-13 DIAGNOSIS — G89.29 CHRONIC LOW BACK PAIN: ICD-10-CM

## 2025-06-13 DIAGNOSIS — R26.89 BALANCE DISORDER: Primary | ICD-10-CM

## 2025-06-13 DIAGNOSIS — R26.9 GAIT DISTURBANCE: ICD-10-CM

## 2025-06-13 DIAGNOSIS — M54.50 CHRONIC LOW BACK PAIN: ICD-10-CM

## 2025-06-17 DIAGNOSIS — E78.2 MIXED HYPERLIPIDEMIA: ICD-10-CM

## 2025-06-18 ENCOUNTER — HOSPITAL ENCOUNTER (EMERGENCY)
Facility: HOSPITAL | Age: 85
Discharge: HOME | End: 2025-06-18
Attending: STUDENT IN AN ORGANIZED HEALTH CARE EDUCATION/TRAINING PROGRAM
Payer: MEDICARE

## 2025-06-18 ENCOUNTER — APPOINTMENT (OUTPATIENT)
Dept: RADIOLOGY | Facility: HOSPITAL | Age: 85
End: 2025-06-18
Payer: MEDICARE

## 2025-06-18 ENCOUNTER — TELEPHONE (OUTPATIENT)
Dept: PRIMARY CARE | Facility: CLINIC | Age: 85
End: 2025-06-18

## 2025-06-18 ENCOUNTER — APPOINTMENT (OUTPATIENT)
Dept: PHYSICAL THERAPY | Facility: CLINIC | Age: 85
End: 2025-06-18
Payer: MEDICARE

## 2025-06-18 ENCOUNTER — APPOINTMENT (OUTPATIENT)
Dept: CARDIOLOGY | Facility: HOSPITAL | Age: 85
End: 2025-06-18
Payer: MEDICARE

## 2025-06-18 VITALS
WEIGHT: 127 LBS | SYSTOLIC BLOOD PRESSURE: 167 MMHG | HEIGHT: 69 IN | HEART RATE: 76 BPM | TEMPERATURE: 96.8 F | OXYGEN SATURATION: 98 % | RESPIRATION RATE: 18 BRPM | BODY MASS INDEX: 18.81 KG/M2 | DIASTOLIC BLOOD PRESSURE: 64 MMHG

## 2025-06-18 DIAGNOSIS — R00.2 PALPITATIONS: ICD-10-CM

## 2025-06-18 DIAGNOSIS — M54.50 CHRONIC LOW BACK PAIN: ICD-10-CM

## 2025-06-18 DIAGNOSIS — G89.29 CHRONIC LOW BACK PAIN: ICD-10-CM

## 2025-06-18 DIAGNOSIS — R26.9 GAIT DISTURBANCE: ICD-10-CM

## 2025-06-18 DIAGNOSIS — R26.89 BALANCE DISORDER: Primary | ICD-10-CM

## 2025-06-18 DIAGNOSIS — N30.01 ACUTE CYSTITIS WITH HEMATURIA: Primary | ICD-10-CM

## 2025-06-18 LAB
ALBUMIN SERPL BCP-MCNC: 4.5 G/DL (ref 3.4–5)
ALP SERPL-CCNC: 48 U/L (ref 33–136)
ALT SERPL W P-5'-P-CCNC: 13 U/L (ref 7–45)
AMMONIA PLAS-SCNC: 20 UMOL/L (ref 16–53)
ANION GAP SERPL CALC-SCNC: 13 MMOL/L (ref 10–20)
APAP SERPL-MCNC: <10 UG/ML (ref ?–30)
APPEARANCE UR: CLEAR
AST SERPL W P-5'-P-CCNC: 17 U/L (ref 9–39)
ATRIAL RATE: 78 BPM
BASE EXCESS BLDV CALC-SCNC: 2.5 MMOL/L (ref -2–3)
BASOPHILS # BLD AUTO: 0.04 X10*3/UL (ref 0–0.1)
BASOPHILS NFR BLD AUTO: 0.5 %
BILIRUB SERPL-MCNC: 0.5 MG/DL (ref 0–1.2)
BILIRUB UR STRIP.AUTO-MCNC: NEGATIVE MG/DL
BNP SERPL-MCNC: 141 PG/ML (ref 0–99)
BODY TEMPERATURE: ABNORMAL
BUN SERPL-MCNC: 35 MG/DL (ref 6–23)
CALCIUM SERPL-MCNC: 9.9 MG/DL (ref 8.6–10.3)
CARDIAC TROPONIN I PNL SERPL HS: 15 NG/L (ref 0–13)
CARDIAC TROPONIN I PNL SERPL HS: 15 NG/L (ref 0–13)
CHLORIDE SERPL-SCNC: 101 MMOL/L (ref 98–107)
CO2 SERPL-SCNC: 27 MMOL/L (ref 21–32)
COLOR UR: COLORLESS
CREAT SERPL-MCNC: 1.19 MG/DL (ref 0.5–1.05)
EGFRCR SERPLBLD CKD-EPI 2021: 45 ML/MIN/1.73M*2
EOSINOPHIL # BLD AUTO: 0.2 X10*3/UL (ref 0–0.4)
EOSINOPHIL NFR BLD AUTO: 2.4 %
ERYTHROCYTE [DISTWIDTH] IN BLOOD BY AUTOMATED COUNT: 11.9 % (ref 11.5–14.5)
ETHANOL SERPL-MCNC: <10 MG/DL
GLUCOSE SERPL-MCNC: 115 MG/DL (ref 74–99)
GLUCOSE UR STRIP.AUTO-MCNC: NORMAL MG/DL
HCO3 BLDV-SCNC: 28.4 MMOL/L (ref 22–26)
HCT VFR BLD AUTO: 44.8 % (ref 36–46)
HGB BLD-MCNC: 14.7 G/DL (ref 12–16)
IMM GRANULOCYTES # BLD AUTO: 0.01 X10*3/UL (ref 0–0.5)
IMM GRANULOCYTES NFR BLD AUTO: 0.1 % (ref 0–0.9)
INHALED O2 CONCENTRATION: 21 %
KETONES UR STRIP.AUTO-MCNC: NEGATIVE MG/DL
LEUKOCYTE ESTERASE UR QL STRIP.AUTO: ABNORMAL
LYMPHOCYTES # BLD AUTO: 1.67 X10*3/UL (ref 0.8–3)
LYMPHOCYTES NFR BLD AUTO: 20.3 %
MAGNESIUM SERPL-MCNC: 2.06 MG/DL (ref 1.6–2.4)
MCH RBC QN AUTO: 31.5 PG (ref 26–34)
MCHC RBC AUTO-ENTMCNC: 32.8 G/DL (ref 32–36)
MCV RBC AUTO: 96 FL (ref 80–100)
MONOCYTES # BLD AUTO: 0.72 X10*3/UL (ref 0.05–0.8)
MONOCYTES NFR BLD AUTO: 8.8 %
NEUTROPHILS # BLD AUTO: 5.58 X10*3/UL (ref 1.6–5.5)
NEUTROPHILS NFR BLD AUTO: 67.9 %
NITRITE UR QL STRIP.AUTO: NEGATIVE
NRBC BLD-RTO: 0 /100 WBCS (ref 0–0)
OXYHGB MFR BLDV: 46 % (ref 45–75)
P AXIS: 80 DEGREES
P OFFSET: 215 MS
P ONSET: 158 MS
PCO2 BLDV: 48 MM HG (ref 41–51)
PH BLDV: 7.38 PH (ref 7.33–7.43)
PH UR STRIP.AUTO: 7.5 [PH]
PLATELET # BLD AUTO: 187 X10*3/UL (ref 150–450)
PO2 BLDV: 28 MM HG (ref 35–45)
POTASSIUM SERPL-SCNC: 3.8 MMOL/L (ref 3.5–5.3)
PR INTERVAL: 132 MS
PROT SERPL-MCNC: 7.6 G/DL (ref 6.4–8.2)
PROT UR STRIP.AUTO-MCNC: ABNORMAL MG/DL
Q ONSET: 224 MS
QRS COUNT: 13 BEATS
QRS DURATION: 80 MS
QT INTERVAL: 406 MS
QTC CALCULATION(BAZETT): 462 MS
QTC FREDERICIA: 443 MS
R AXIS: 75 DEGREES
RBC # BLD AUTO: 4.66 X10*6/UL (ref 4–5.2)
RBC # UR STRIP.AUTO: NEGATIVE MG/DL
RBC #/AREA URNS AUTO: ABNORMAL /HPF
SALICYLATES SERPL-MCNC: <3 MG/DL (ref ?–20)
SAO2 % BLDV: 46 % (ref 45–75)
SODIUM SERPL-SCNC: 137 MMOL/L (ref 136–145)
SP GR UR STRIP.AUTO: 1.01
SQUAMOUS #/AREA URNS AUTO: ABNORMAL /HPF
T AXIS: 68 DEGREES
T OFFSET: 427 MS
T4 FREE SERPL-MCNC: 0.78 NG/DL (ref 0.61–1.12)
TSH SERPL-ACNC: 4.07 MIU/L (ref 0.44–3.98)
UROBILINOGEN UR STRIP.AUTO-MCNC: NORMAL MG/DL
VENTRICULAR RATE: 78 BPM
WBC # BLD AUTO: 8.2 X10*3/UL (ref 4.4–11.3)
WBC #/AREA URNS AUTO: >50 /HPF

## 2025-06-18 PROCEDURE — 71045 X-RAY EXAM CHEST 1 VIEW: CPT | Mod: FOREIGN READ | Performed by: RADIOLOGY

## 2025-06-18 PROCEDURE — 84439 ASSAY OF FREE THYROXINE: CPT | Performed by: STUDENT IN AN ORGANIZED HEALTH CARE EDUCATION/TRAINING PROGRAM

## 2025-06-18 PROCEDURE — 36415 COLL VENOUS BLD VENIPUNCTURE: CPT | Performed by: STUDENT IN AN ORGANIZED HEALTH CARE EDUCATION/TRAINING PROGRAM

## 2025-06-18 PROCEDURE — 99285 EMERGENCY DEPT VISIT HI MDM: CPT | Performed by: STUDENT IN AN ORGANIZED HEALTH CARE EDUCATION/TRAINING PROGRAM

## 2025-06-18 PROCEDURE — 2500000001 HC RX 250 WO HCPCS SELF ADMINISTERED DRUGS (ALT 637 FOR MEDICARE OP): Performed by: STUDENT IN AN ORGANIZED HEALTH CARE EDUCATION/TRAINING PROGRAM

## 2025-06-18 PROCEDURE — 84484 ASSAY OF TROPONIN QUANT: CPT | Performed by: STUDENT IN AN ORGANIZED HEALTH CARE EDUCATION/TRAINING PROGRAM

## 2025-06-18 PROCEDURE — 82810 BLOOD GASES O2 SAT ONLY: CPT | Mod: CCI | Performed by: STUDENT IN AN ORGANIZED HEALTH CARE EDUCATION/TRAINING PROGRAM

## 2025-06-18 PROCEDURE — 2500000004 HC RX 250 GENERAL PHARMACY W/ HCPCS (ALT 636 FOR OP/ED): Performed by: STUDENT IN AN ORGANIZED HEALTH CARE EDUCATION/TRAINING PROGRAM

## 2025-06-18 PROCEDURE — 83880 ASSAY OF NATRIURETIC PEPTIDE: CPT | Performed by: STUDENT IN AN ORGANIZED HEALTH CARE EDUCATION/TRAINING PROGRAM

## 2025-06-18 PROCEDURE — 99285 EMERGENCY DEPT VISIT HI MDM: CPT | Mod: 25 | Performed by: STUDENT IN AN ORGANIZED HEALTH CARE EDUCATION/TRAINING PROGRAM

## 2025-06-18 PROCEDURE — 80143 DRUG ASSAY ACETAMINOPHEN: CPT | Performed by: STUDENT IN AN ORGANIZED HEALTH CARE EDUCATION/TRAINING PROGRAM

## 2025-06-18 PROCEDURE — 82140 ASSAY OF AMMONIA: CPT | Performed by: STUDENT IN AN ORGANIZED HEALTH CARE EDUCATION/TRAINING PROGRAM

## 2025-06-18 PROCEDURE — 70450 CT HEAD/BRAIN W/O DYE: CPT

## 2025-06-18 PROCEDURE — 85025 COMPLETE CBC W/AUTO DIFF WBC: CPT | Performed by: STUDENT IN AN ORGANIZED HEALTH CARE EDUCATION/TRAINING PROGRAM

## 2025-06-18 PROCEDURE — 70450 CT HEAD/BRAIN W/O DYE: CPT | Performed by: RADIOLOGY

## 2025-06-18 PROCEDURE — 82805 BLOOD GASES W/O2 SATURATION: CPT | Performed by: STUDENT IN AN ORGANIZED HEALTH CARE EDUCATION/TRAINING PROGRAM

## 2025-06-18 PROCEDURE — 80320 DRUG SCREEN QUANTALCOHOLS: CPT | Performed by: STUDENT IN AN ORGANIZED HEALTH CARE EDUCATION/TRAINING PROGRAM

## 2025-06-18 PROCEDURE — 96365 THER/PROPH/DIAG IV INF INIT: CPT

## 2025-06-18 PROCEDURE — 81001 URINALYSIS AUTO W/SCOPE: CPT | Mod: 59 | Performed by: STUDENT IN AN ORGANIZED HEALTH CARE EDUCATION/TRAINING PROGRAM

## 2025-06-18 PROCEDURE — 93005 ELECTROCARDIOGRAM TRACING: CPT

## 2025-06-18 PROCEDURE — 71045 X-RAY EXAM CHEST 1 VIEW: CPT

## 2025-06-18 PROCEDURE — 83735 ASSAY OF MAGNESIUM: CPT | Performed by: STUDENT IN AN ORGANIZED HEALTH CARE EDUCATION/TRAINING PROGRAM

## 2025-06-18 PROCEDURE — 84443 ASSAY THYROID STIM HORMONE: CPT | Performed by: STUDENT IN AN ORGANIZED HEALTH CARE EDUCATION/TRAINING PROGRAM

## 2025-06-18 PROCEDURE — 80053 COMPREHEN METABOLIC PANEL: CPT | Performed by: STUDENT IN AN ORGANIZED HEALTH CARE EDUCATION/TRAINING PROGRAM

## 2025-06-18 RX ORDER — CEPHALEXIN 500 MG/1
500 CAPSULE ORAL 2 TIMES DAILY
Qty: 10 CAPSULE | Refills: 0 | Status: SHIPPED | OUTPATIENT
Start: 2025-06-18 | End: 2025-06-23

## 2025-06-18 RX ORDER — AMLODIPINE BESYLATE 5 MG/1
5 TABLET ORAL ONCE
Status: COMPLETED | OUTPATIENT
Start: 2025-06-18 | End: 2025-06-18

## 2025-06-18 RX ORDER — CEFTRIAXONE 1 G/50ML
1 INJECTION, SOLUTION INTRAVENOUS ONCE
Status: COMPLETED | OUTPATIENT
Start: 2025-06-18 | End: 2025-06-18

## 2025-06-18 RX ADMIN — AMLODIPINE BESYLATE 5 MG: 5 TABLET ORAL at 02:39

## 2025-06-18 RX ADMIN — CEFTRIAXONE 1 G: 1 INJECTION, SOLUTION INTRAVENOUS at 02:41

## 2025-06-18 ASSESSMENT — LIFESTYLE VARIABLES
TOTAL SCORE: 0
EVER HAD A DRINK FIRST THING IN THE MORNING TO STEADY YOUR NERVES TO GET RID OF A HANGOVER: NO
HAVE PEOPLE ANNOYED YOU BY CRITICIZING YOUR DRINKING: NO
EVER FELT BAD OR GUILTY ABOUT YOUR DRINKING: NO
HAVE YOU EVER FELT YOU SHOULD CUT DOWN ON YOUR DRINKING: NO

## 2025-06-18 ASSESSMENT — PAIN DESCRIPTION - DESCRIPTORS: DESCRIPTORS: PRESSURE

## 2025-06-18 ASSESSMENT — PAIN SCALES - GENERAL
PAINLEVEL_OUTOF10: 0 - NO PAIN
PAINLEVEL_OUTOF10: 0 - NO PAIN
PAINLEVEL_OUTOF10: 2

## 2025-06-18 ASSESSMENT — PAIN - FUNCTIONAL ASSESSMENT: PAIN_FUNCTIONAL_ASSESSMENT: 0-10

## 2025-06-18 NOTE — ED PROVIDER NOTES
Emergency Department Provider Note       History of Present Illness     History provided by: Patient  Limitations to History: None  External Records Reviewed with Brief Summary: Outpatient progress note from 4/10/25 which showed visit for anxiety and nocturia and eye issues, started on buspar 5 mg BID, falling issues and started on PT, continuing on current meds for HTN    HPI:  Betty Stein is a 85 y.o. female who presents with palpitations earlier today, concern for high blood pressure with mild light-headedness, no vertiginous room spinning. Patient lives in independent living with her 93-year-old , she knows her age, that she has at [facility], but does indicate that she is more confused this evening.  Reports that she was feeling off around 9 PM, tried to lay down to go to sleep around 11 PM, continued to feel unwell, lightheaded, told her  that she was not feeling well, they checked her blood pressure and her SBP was in the 180s.  With her mild confusion, feeling off, the intermittent fluttering sensation in her chest, they called 911. Patient denies chest pain, shortness of breath, abdominal pain, recent fall, headache, back pain, flank pain, fatigue, weakness. She reports she does all of her meds herself, is unsure if she is supposed to take blood pressure meds at night.  Patient does incidentally report increased urinary frequency this evening.    Per chart review, is on irbesartan hydrochlorothiazide and amlodipine for HTN as well as Xanax PRN for anxiety.     She denies recent falls.  arrived at bedside and provided collateral.  He reports that she seems to be doing well up until she started complaining of feeling off around 11 PM and had this high blood pressure.  He indicates that she has complained of a fluttering sensation in her chest intermittently over the years, but nothing recently.  She does do all of her meds, and he indicates that acutely this evening she does seem a  little more confused than usual.    Physical Exam   Triage vitals:  T 36.1 °C (97 °F)  HR 70  BP (!) 193/71  RR 18  O2 99 % None (Room air)    General: Awake, alert, in no acute distress  Eyes: Gaze conjugate.  No scleral icterus or injection  HENT: Normo-cephalic, atraumatic. No stridor  CV: Regular rate, regular rhythm. Radial pulses 2+ bilaterally  Resp: Breathing non-labored, speaking in full sentences.  Clear to auscultation bilaterally  GI: Soft, non-distended, non-tender. No rebound or guarding  MSK/Extremities: No gross bony deformities. Moving all extremities  Skin: Warm. Appropriate color  Neuro: Alert. Oriented. Face symmetric. Speech is fluent.  Gross strength and sensation intact in b/l UE and Les.  NIH stroke scale 0, Van negative, strength 5/5 in upper and lower extremities, normal coordination in upper and lower extremities, mild difficulty with gait, but no ataxia.  Patient reports she normally walks fine, but feels a little more unsteady at this time.  Normal reflexes, normal cranial nerves  Psych: Appropriate mood and affect      Medical Decision Making & ED Course   Medical Decision Makin y.o. female who presents with palpitations, acute confusion mild, is A&O x 4, NIH stroke scale 0, normal neurologic exam otherwise.  This was an acute change, I do not believe this is an ischemic anterior posterior stroke.  She also incidentally describes increased urinary frequency which raises suspicion for UTI as cause of AMS.  I am sending for CT head, chest x-ray and broad metabolic workup for cause of patient's acute mild metabolic encephalopathy.  Her blood pressure is mildly high, she is not sure when she last had a blood pressure meds.  It does appear that her blood pressures normally run in the 100s to 150s, screening for ACS.  EKG within normal limits, troponin stable flat, urine grossly positive.  Giving ceftriaxone and a dose of her home amlodipine, blood pressure downtrended from  193//64.  Remains afebrile, saturating well on room air, normal heart rate.  Remainder of workup generally within normal limits, creatinine at her baseline.  CT head within normal limits my review and by radiology.    At this time I did offer patient admission for mild encephalopathy/delirium secondary to UTI.  Both patient and her  would very much like to return home.  Therefore, I sent patient with course of Keflex 500 mg twice daily for 5 days with extremely strong return precautions.  Patient and  expressed understanding of this plan.    No indication for heart score as patient has not complained of chest pain, was coded in triage as chest pain, but adamantly denies to both myself, her  and nursing staff that she has had any chest pain.    Differential diagnoses considered include but are not limited to: UTI, intracranial bleed occult, metabolic abnormality including hypercarbia, STEPHANE; occult ACS    Social Determinants of Health which Significantly Impact Care: Social Determinants of Health which Significantly Impact Care: None identified     EKG Independent Interpretation: EKG interpreted by myself. Please see ED Course for full interpretation.    Independent Result Review and Interpretation: Relevant laboratory and radiographic results were reviewed and independently interpreted by myself.  As necessary, they are commented on in the ED Course.    Chronic conditions affecting the patient's care: As documented above in [facility]    The patient was discussed with the following consultants/services: None    Care Considerations: As documented above in [facility]    ED Course:  ED Course as of 06/18/25 0332   Wed Jun 18, 2025   0035 NIHSS 0, VAN negative [[facility]]   0155 ECG 12 lead  EKG shows normal sinus rhythm, rate 78, , QRS 80, QTc 462, normal axis deviation, T wave inversion in V2, flattening in aVL, these nonspecific T wave changes were present on prior EKGs []   0220 WBC, Urine(!):  >50  Likely has a UTI, giving ceftriaxone, giving a dose of patient's home amlodipine, will reevaluate.  If her AMS improves after ceftriaxone, could possibly go home.  If she remains confused, would be some degree of metabolic encephalopathy and would offer admission []   0233 BNP(!): 141  Less than 250, nonactionable []   0234 Creatinine(!): 1.19  Near baseline []   0234 Ammonia: 20 []   0234 Alcohol: <10 []   0253 Thyroid Stimulating Hormone(!): 4.07  Near baseline []      ED Course User Index  [] Guanako Hamilton MD         Diagnoses as of 06/18/25 0332   Acute cystitis with hematuria   Palpitations       Disposition   As a result of the work-up, the patient was discharged home.  she was informed of her diagnosis and instructed to come back with any concerns or worsening of condition.  she and was agreeable to the plan as discussed above.  she was given the opportunity to ask questions.  All of the patient's questions were answered.    Procedures   Procedures    Patient was seen independently    Guanako Hamilton MD  Emergency Medicine             Guanako Hamilton MD  06/18/25 3609

## 2025-06-20 ENCOUNTER — APPOINTMENT (OUTPATIENT)
Dept: PHYSICAL THERAPY | Facility: CLINIC | Age: 85
End: 2025-06-20
Payer: MEDICARE

## 2025-06-20 DIAGNOSIS — M54.50 CHRONIC LOW BACK PAIN: ICD-10-CM

## 2025-06-20 DIAGNOSIS — G89.29 CHRONIC LOW BACK PAIN: ICD-10-CM

## 2025-06-20 DIAGNOSIS — R26.89 BALANCE DISORDER: Primary | ICD-10-CM

## 2025-06-20 DIAGNOSIS — R26.9 GAIT DISTURBANCE: ICD-10-CM

## 2025-06-20 RX ORDER — ATORVASTATIN CALCIUM 20 MG/1
20 TABLET, FILM COATED ORAL NIGHTLY
Qty: 90 TABLET | Refills: 1 | Status: SHIPPED | OUTPATIENT
Start: 2025-06-20

## 2025-06-23 ENCOUNTER — TELEPHONE (OUTPATIENT)
Dept: PRIMARY CARE | Facility: CLINIC | Age: 85
End: 2025-06-23
Payer: MEDICARE

## 2025-06-23 DIAGNOSIS — F41.1 GENERALIZED ANXIETY DISORDER: Primary | ICD-10-CM

## 2025-06-23 RX ORDER — MIRTAZAPINE 7.5 MG/1
7.5 TABLET, FILM COATED ORAL NIGHTLY
Qty: 30 TABLET | Refills: 1 | Status: SHIPPED | OUTPATIENT
Start: 2025-06-23 | End: 2025-09-21

## 2025-06-23 NOTE — TELEPHONE ENCOUNTER
"Patient called and left voicemail     Stated the Buspar she was prescribed is making her \"not feel well\"    I called her back to get more info  She said she can't sleep, increases blood pressure, and makes her feel jittery     She said you can reply to her via nGamet message     She said she is due to  refill but not sure what to do       "

## 2025-06-25 ENCOUNTER — APPOINTMENT (OUTPATIENT)
Dept: PHYSICAL THERAPY | Facility: CLINIC | Age: 85
End: 2025-06-25
Payer: MEDICARE

## 2025-06-25 DIAGNOSIS — R26.89 BALANCE DISORDER: Primary | ICD-10-CM

## 2025-06-25 DIAGNOSIS — M54.50 CHRONIC LOW BACK PAIN: ICD-10-CM

## 2025-06-25 DIAGNOSIS — G89.29 CHRONIC LOW BACK PAIN: ICD-10-CM

## 2025-06-25 DIAGNOSIS — R26.9 GAIT DISTURBANCE: ICD-10-CM

## 2025-06-26 ENCOUNTER — TELEPHONE (OUTPATIENT)
Dept: CARDIOLOGY | Facility: CLINIC | Age: 85
End: 2025-06-26
Payer: MEDICARE

## 2025-06-26 NOTE — TELEPHONE ENCOUNTER
Patient called nurse line stating she was recently put on Mirtazapine for anxiety, however; patient stated medication has caused her to be more anxious. Patient asking what she should do next

## 2025-06-26 NOTE — TELEPHONE ENCOUNTER
Called patient, got reply message that said caller is busy       Stable, continue Omeprazole 40 mg daily.

## 2025-06-29 LAB
ATRIAL RATE: 78 BPM
P AXIS: 80 DEGREES
P OFFSET: 215 MS
P ONSET: 158 MS
PR INTERVAL: 132 MS
Q ONSET: 224 MS
QRS COUNT: 13 BEATS
QRS DURATION: 80 MS
QT INTERVAL: 406 MS
QTC CALCULATION(BAZETT): 462 MS
QTC FREDERICIA: 443 MS
R AXIS: 75 DEGREES
T AXIS: 68 DEGREES
T OFFSET: 427 MS
VENTRICULAR RATE: 78 BPM

## 2025-07-09 DIAGNOSIS — I10 PRIMARY HYPERTENSION: ICD-10-CM

## 2025-07-10 RX ORDER — IRBESARTAN AND HYDROCHLOROTHIAZIDE 150; 12.5 MG/1; MG/1
1 TABLET, FILM COATED ORAL DAILY
Qty: 90 TABLET | Refills: 3 | Status: SHIPPED | OUTPATIENT
Start: 2025-07-10

## 2025-07-14 PROBLEM — J81.0 FLASH PULMONARY EDEMA: Status: ACTIVE | Noted: 2025-01-01

## 2025-07-14 NOTE — CONSULTS
Inpatient consult to Cardiology  Consult performed by: Juni Robledo MD  Consult ordered by: Mohinder Espinoza MD          Reason For Consult  STEMI    History Of Present Illness  Betty Stein is a 85 y.o. 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  Patient presents to [facility] for chief complaint of substernal chest pain which started at approximately 4 PM today.  The pain radiated to her left shoulder and then down her arm.  Nothing made the pain worse or better, and onset was after awaking from a nap.  En route to the emergency department EKG by EMS was concerning for STEMI however it was an incomplete EKG.  In the emergency department she developed shortness of breath worsening from the 4 L of nasal cannula that EMS had placed her on where she then required nonrebreather to maintain oxygen saturations of 90%.  Repeat EKG obtained in the emergency department showed ST elevations in the inferior leads and diffuse ST depressions in the anterior and lateral leads.  There was concern for STEMI so STEMI alert was called, cardiology was consulted.  And patient was taken to the cardiac catheterization lab.    In the ED:  - Vitals: Temp 97.7 F, HR 80, RR 30, /61, SpO2 77% - 100% (on NRB)  - Labs: Not had yet been obtained  - Imaging: None have been obtained other than EKG which is described above  - Intervention: Patient was given  mg en route, and was Brilinta loaded and heparin loaded in the emergency department.     Past Medical History  She has a past medical history of Encounter for examination for normal comparison and control in clinical research program (01/21/2019), Inflamed seborrheic keratosis (05/14/2020), Ingrowing nail (04/15/2021), Nonrheumatic aortic (valve) stenosis (05/10/2019), and Personal history of other diseases of the musculoskeletal system and connective tissue (11/11/2021).    Surgical History  She has a past surgical history  that includes Other surgical history (06/29/2018); Other surgical history (11/11/2021); Other surgical history (11/11/2021); Other surgical history (11/11/2021); Other surgical history (11/11/2021); Other surgical history (11/11/2021); Other surgical history (11/11/2021); Other surgical history (11/11/2021); and Other surgical history (11/18/2021).     Social History  She reports that she has never smoked. She has never been exposed to tobacco smoke. She has never used smokeless tobacco. She reports that she does not currently use alcohol after a past usage of about 1.0 standard drink of alcohol per week. She reports that she does not use drugs.    Family History  Family History[1]     Allergies  Patient has no known allergies.    Review of Systems  Patient denies all symptomatology pertaining to a 12 point ROS with exception to those listed above HPI.     Physical Exam  General: in acute distress, A&O x 3, alert, cooperative, ill-appearing  HEENT: Normocephalic, atraumatic, EOMI, moist mucous membranes, NRB in place  Neck: Neck supple, trachea midline, no evidence of trauma  Cardiovascular: RRR, S1 and S2 appreciated, no murmurs rubs gallops appreciated, distal pulses 2+ bilaterally (radial and dorsalis pedis)  Respiratory: Coarse breath sounds appreciated bilaterally, no adventitious sounds appreciated, markedly increased work of breathing on NRB  GI: Abdomen soft, nondistended, nontender to palpation, bowel sounds present  Extremities: No edema appreciated in lower extremities bilaterally, no cyanosis  Neuro: A&O X3, no focal deficits, strength and sensation intact bilaterally  Skin: Warm and dry, without lesions or rashes         Last Recorded Vitals  /67   Pulse 85   Temp 36.5 °C (97.7 °F) (Temporal)   Resp 20   Wt 56.7 kg (125 lb)   SpO2 93%     Relevant Results  Scheduled medications  Scheduled Medications[2]  Continuous medications  Continuous Medications[3]  PRN medications  PRN  Medications[4]  Results for orders placed or performed during the hospital encounter of 07/14/25 (from the past 24 hours)   CBC and Auto Differential   Result Value Ref Range    WBC 6.9 4.4 - 11.3 x10*3/uL    nRBC 0.0 0.0 - 0.0 /100 WBCs    RBC 4.95 4.00 - 5.20 x10*6/uL    Hemoglobin 15.8 12.0 - 16.0 g/dL    Hematocrit 47.8 (H) 36.0 - 46.0 %    MCV 97 80 - 100 fL    MCH 31.9 26.0 - 34.0 pg    MCHC 33.1 32.0 - 36.0 g/dL    RDW 12.2 11.5 - 14.5 %    Platelets 228 150 - 450 x10*3/uL    Neutrophils % 64.2 40.0 - 80.0 %    Immature Granulocytes %, Automated 0.3 0.0 - 0.9 %    Lymphocytes % 26.8 13.0 - 44.0 %    Monocytes % 5.8 2.0 - 10.0 %    Eosinophils % 2.3 0.0 - 6.0 %    Basophils % 0.6 0.0 - 2.0 %    Neutrophils Absolute 4.45 1.60 - 5.50 x10*3/uL    Immature Granulocytes Absolute, Automated 0.02 0.00 - 0.50 x10*3/uL    Lymphocytes Absolute 1.86 0.80 - 3.00 x10*3/uL    Monocytes Absolute 0.40 0.05 - 0.80 x10*3/uL    Eosinophils Absolute 0.16 0.00 - 0.40 x10*3/uL    Basophils Absolute 0.04 0.00 - 0.10 x10*3/uL   Comprehensive Metabolic Panel   Result Value Ref Range    Glucose 129 (H) 74 - 99 mg/dL    Sodium 138 136 - 145 mmol/L    Potassium 4.2 3.5 - 5.3 mmol/L    Chloride 101 98 - 107 mmol/L    Bicarbonate 27 21 - 32 mmol/L    Anion Gap 14 10 - 20 mmol/L    Urea Nitrogen 34 (H) 6 - 23 mg/dL    Creatinine 1.21 (H) 0.50 - 1.05 mg/dL    eGFR 44 (L) >60 mL/min/1.73m*2    Calcium 9.8 8.6 - 10.3 mg/dL    Albumin 4.4 3.4 - 5.0 g/dL    Alkaline Phosphatase 50 33 - 136 U/L    Total Protein 7.5 6.4 - 8.2 g/dL    AST 25 9 - 39 U/L    Bilirubin, Total 0.5 0.0 - 1.2 mg/dL    ALT 15 7 - 45 U/L   Magnesium   Result Value Ref Range    Magnesium 2.39 1.60 - 2.40 mg/dL   Troponin I, High Sensitivity, Initial   Result Value Ref Range    Troponin I, High Sensitivity 29 (H) 0 - 13 ng/L   Blood Gas Venous Full Panel Unsolicited   Result Value Ref Range    POCT pH, Venous 7.40 7.33 - 7.43 pH    POCT pCO2, Venous 45 41 - 51 mm Hg    POCT  pO2, Venous 25 (L) 35 - 45 mm Hg    POCT SO2, Venous 36 (L) 45 - 75 %    POCT Oxy Hemoglobin, Venous 35.4 (L) 45.0 - 75.0 %    POCT Hematocrit Calculated, Venous 48.0 (H) 36.0 - 46.0 %    POCT Sodium, Venous 133 (L) 136 - 145 mmol/L    POCT Potassium, Venous 5.2 3.5 - 5.3 mmol/L    POCT Chloride, Venous 103 98 - 107 mmol/L    POCT Ionized Calicum, Venous 1.20 1.10 - 1.33 mmol/L    POCT Glucose, Venous 141 (H) 74 - 99 mg/dL    POCT Lactate, Venous 1.8 0.4 - 2.0 mmol/L    POCT Base Excess, Venous 2.4 -2.0 - 3.0 mmol/L    POCT HCO3 Calculated, Venous 27.9 (H) 22.0 - 26.0 mmol/L    POCT Hemoglobin, Venous 16.0 12.0 - 16.0 g/dL    POCT Anion Gap, Venous 7.0 (L) 10.0 - 25.0 mmol/L    Patient Temperature       XR chest 1 view  Result Date: 7/14/2025  Interpreted By:  Ghassan Benítez, STUDY: XR CHEST 1 VIEW;  7/14/2025 5:41 pm   INDICATION: Signs/Symptoms:STEMI.   COMPARISON: Chest radiograph 06/18/2025   ACCESSION NUMBER(S): [id number]   ORDERING CLINICIAN: ABDIRAHMAN LUNDBERG   FINDINGS:     CARDIOMEDIASTINAL SILHOUETTE: Cardiomediastinal silhouette is stable in size and configuration.   LUNGS: No consolidation, pneumothorax, or significant effusion. Diffuse bilateral reticular changes which are increased since prior comparison exam suggestive of interstitial edema or pneumonitis.   ABDOMEN: No remarkable upper abdominal findings.   BONES: No acute osseous changes. Old-appearing rib deformities.       1.  Increasing bilateral reticular opacities which are suggestive of interstitial edema or pneumonitis.     Signed by: Ghassan Benítez 7/14/2025 5:58 PM Dictation workstation:   [id number]    ECG 12 lead  Result Date: 6/29/2025  Normal sinus rhythm Possible Left atrial enlargement Septal infarct (cited on or before 06-AUG-2019) Abnormal ECG When compared with ECG of 10-SARAH-2022 16:07, No significant change was found Confirmed by Rodrigo Jenkins ([id number]) on 6/29/2025 9:09:46 PM    CT head wo IV contrast  Result Date:  6/18/2025  Interpreted By:  Alexsandra Cr, STUDY: CT HEAD WO IV CONTRAST;  6/18/2025 2:37 am   INDICATION: Signs/Symptoms:AMS.   COMPARISON: 01/10/2022   ACCESSION NUMBER(S): [id number]   ORDERING CLINICIAN: NISH WYLIE   TECHNIQUE: Axial noncontrast CT images of the head.   FINDINGS: BRAIN PARENCHYMA: Small chronic lacunar infarct in the right thalamus. Chronic lacunar infarcts or dilated perivascular spaces in the lentiform nuclei. Deep and periventricular white matter hypodensities are nonspecific, but favored to represent chronic small vessel ischemic changes. Gray-white matter interfaces are preserved. No mass effect or midline shift.   HEMORRHAGE: No acute intracranial hemorrhage. VENTRICLES and EXTRA-AXIAL SPACES: The ventricles and sulci are within normal limits in size for brain volume. No abnormal extraaxial fluid collection. EXTRACRANIAL SOFT TISSUES: Within normal limits. PARANASAL SINUSES/MASTOIDS:  The visualized paranasal sinuses and mastoid air cells are aerated. CALVARIUM: No depressed skull fracture. No destructive osseous lesion.   OTHER FINDINGS: None.       No acute intracranial abnormality.   Chronic ischemic changes as above.   MACRO: None   Signed by: Alexsandra Cr 6/18/2025 3:12 AM Dictation workstation:   [id number]    XR chest 1 view  Result Date: 6/18/2025  STUDY: Chest Radiograph;  6/18/2025 at 2:16 AM INDICATION: Altered mental status.  Evaluate for occult pneumonia. COMPARISON: XR chest 1/10/2022, XR chest 3/23/2019, XR chest 3/22/2019. ACCESSION NUMBER(S): [id number] ORDERING CLINICIAN: NISH WYLIE TECHNIQUE:  Frontal chest was obtained at 0216 hours. FINDINGS: CARDIOMEDIASTINAL SILHOUETTE: Cardiomediastinal silhouette is normal in size and configuration. Calcified plaque is seen in the aorta.  LUNGS: Lungs are clear.  ABDOMEN: No remarkable upper abdominal findings.  BONES: Multiple chronic appearing healed right rib fractures are noted, new compared to the prior study dated  January 10, 2022.  Mild generalized osseous demineralization is noted.    No definite acute cardiopulmonary disease. Signed by Kolby Varela         Assessment/Plan   Patient is an 85-year-old female with PMH of HTN, HLD, Hx TAVR 2019), CAD (s/p PCI with FREDERICK to RCA 2013).  Patient presents to [facility] for the chief complaint of chest pain immediate concern for STEMI.      #Acute severe aortic regurgitation 2/2 TAVR failure  #C/F STEMI  #Acute flash pulmonary edema  #AHRF 2/2 above  Patient was taken to the cardiac catheterization lab, and cardiology was consulted for this.  Upon arrival to the cardiac catheterization lab patient was desaturating on nonrebreather mask down into the low 80% and was unable to lie flat due to respiratory distress.  Chest x-ray obtained in the emergency department illustrated pulmonary vascular congestion with pulmonary edema thus, as the best course of action was to provide positive pressure as well as diuresis through shared decision making with the  who elected to have the patient intubated to undergo the procedure to fix any coronary vascular disease that was likely the culprit of the STEMI.  I then intubated the patient with 15 mg of etomidate and 80 mg of succinylcholine via RSI and glide scope, and the patient tolerated the procedure well subsequently saturating appropriately.  After this, we pursued the initial concern as stated was for STEMI, however after coronary angiogram with no remarkable coronary artery disease amenable to PCI, alternate diagnoses for ST elevations and chest pain were explored.  At this time patient's blood pressure had dropped from normotensive in the emergency department to 80s/30s in the catheterization lab.  With patient now having wide pulse pressure, acute cardiogenic shock, flash pulmonary edema, chest pain, and ST elevations this placed diagnosis of TAVR failure with acute severe aortic regurgitation at the highest.  At this time, a cardiac  shock call was placed via the transfer center and discussed with multiple providers at [facility].  They informed us that putting in an LVAD/Impella would not be saldana as if the patient had a vegetation/thrombus attached to the valve the Impella would dislodge this and cause further complications.  Additionally, with the patient's advanced age there is no data on Sravani TAVR, and with the patient's severe clinical decompensation it would be unlikely that she would be able to be stabilized for transport as well as acquire the necessary preoperative imaging prior to this.  Ultimately, they would not be able to offer her any further intervention.  At this time, we consulted with the  of the patient and through shared decision making decided to not pursue any further intervention.  I spoke with Dr. Butler of the intensivist as the patient is intubated and the /spouse wanted to come to terms with the clinical prognosis of his wife.  Dr. Butler accepted the patient and she will be admitted to the intensive care unit.    Dr. Espinoza was present for the entirety of the above and the case has been discussed with him.    Juni Robledo MD  Internal medicine PGY 3             [1]   Family History  Problem Relation Name Age of Onset    Coronary artery disease Mother      No Known Problems Father     [2]    [3] EPINEPHrine, 0-1 mcg/kg/min, Last Rate: Stopped (07/14/25 1924)  fentaNYL,  mcg/hr, Last Rate: 50 mcg/hr (07/14/25 1945)  [4] PRN medications: oxygen     Retinoid Dermatitis Normal Treatment: I recommended more frequent application of Cetaphil or CeraVe to the areas of dermatitis.

## 2025-07-14 NOTE — H&P
HPI/ED Course:  Patient is an 85-year-old female with a history of TAVR (03/2019), CAD (s/p PCI with FREDERICK to RCA 2013), HTN, HLD, rheumatic fever hypothyroidism, anemia, generalized anxiety disorder presenting as an admit from interventional cardiology in the Cath Lab.  She arrived to the ER with concern for possible STEMI.  She presented to the ER this afternoon for substernal chest pain that started approximately 4 PM.  He radiated to her left shoulder and down her arm.  No consistent alleviating or exacerbating factors.  She noted developing dyspnea in the ER requiring 4 L nasal cannula which eventually transition to a nonrebreather to maintain oxygen saturations greater than 90%.  Repeat EKG in the ER showed ST elevations in the inferior leads and diffuse ST depressions in the anterior and lateral leads.  Patient taken to the Cath Lab.  In the Cath Lab, patient desaturated on a nonrebreather down to the low 80s and was unable to lie flat due to her respiratory distress.  Chest x-ray obtained in the ED resulted demonstrating significant pulmonary vascular congestion with pulmonary edema.  The decision was made to intubate the patient in the Cath Lab.  This was accomplished using 15 mg of etomidate and 80 mg of systemic cooling via RSI.  Patient intubated via glide scope.  The coronary catheterization was then performed.  There was no remarkable coronary artery disease amenable to PCI.  Alternative diagnoses for ST elevations and chest pain were explored.  Her blood pressure dropped from normotensive to 80/30 while still in the Cath Lab.  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  Dr. Espinoza had a conversation with the shock team at [facility].  Reportedly, they were informed that putting in an LVAD/Impella would be unwise as the patient had a vegetation or thrombus attached to the valve the Impella would dislodge the clot and cause further complications.   Additionally, with the patient's advanced age, there is no data on EMERALD TAVR.  In consideration of this and the patient's clinical decompensation, it would be unlikely that she would be able to be stabilized for transport and to acquire the necessary preoperative imaging.  They ultimately determined that they would not be able to safely offer any further intervention.  They discussed situation with the .  Shared decision making was engaged in.  They made the decision to not engage in further intervention at this time.  Patient admitted to the ICU given intubated status as the  wished to have some additional time with her.  Patient made DNR CCA allowing for mechanical ventilation.  They will likely pursue comfort measures after additional discussion.    Scheduled Medications:   Scheduled Medications[1]     Continuous Medications:   Continuous Medications[2]     PRN Medications:   PRN Medications[3]    Objective   Vitals:  Temp  Min: 36.5 °C (97.7 °F)  Max: 36.5 °C (97.7 °F)  Pulse  Min: 78  Max: 85  BP  Min: 132/61  Max: 149/67  Resp  Min: 20  Max: 51  SpO2  Min: 77 %  Max: 100 %    Physical Exam:   General: ill-appearing, poor perfusion  Skin: Clammy, dry, intact  HEENT: NC/AT, trachea midline  CV: RRR, normal heart sounds, no lower extremity edema  Pulm: rhonchi in the bilateral bases, intubated  GI: Soft, non-tender, non-distended  MSK: No clear deformities or injuries  Neurology: sedated, but nods to questions, will  fingers    Labs:     Results for orders placed or performed during the hospital encounter of 07/14/25 (from the past 24 hours)   CBC and Auto Differential   Result Value Ref Range    WBC 6.9 4.4 - 11.3 x10*3/uL    nRBC 0.0 0.0 - 0.0 /100 WBCs    RBC 4.95 4.00 - 5.20 x10*6/uL    Hemoglobin 15.8 12.0 - 16.0 g/dL    Hematocrit 47.8 (H) 36.0 - 46.0 %    MCV 97 80 - 100 fL    MCH 31.9 26.0 - 34.0 pg    MCHC 33.1 32.0 - 36.0 g/dL    RDW 12.2 11.5 - 14.5 %    Platelets 228 150 - 450  x10*3/uL    Neutrophils % 64.2 40.0 - 80.0 %    Immature Granulocytes %, Automated 0.3 0.0 - 0.9 %    Lymphocytes % 26.8 13.0 - 44.0 %    Monocytes % 5.8 2.0 - 10.0 %    Eosinophils % 2.3 0.0 - 6.0 %    Basophils % 0.6 0.0 - 2.0 %    Neutrophils Absolute 4.45 1.60 - 5.50 x10*3/uL    Immature Granulocytes Absolute, Automated 0.02 0.00 - 0.50 x10*3/uL    Lymphocytes Absolute 1.86 0.80 - 3.00 x10*3/uL    Monocytes Absolute 0.40 0.05 - 0.80 x10*3/uL    Eosinophils Absolute 0.16 0.00 - 0.40 x10*3/uL    Basophils Absolute 0.04 0.00 - 0.10 x10*3/uL   Comprehensive Metabolic Panel   Result Value Ref Range    Glucose 129 (H) 74 - 99 mg/dL    Sodium 138 136 - 145 mmol/L    Potassium 4.2 3.5 - 5.3 mmol/L    Chloride 101 98 - 107 mmol/L    Bicarbonate 27 21 - 32 mmol/L    Anion Gap 14 10 - 20 mmol/L    Urea Nitrogen 34 (H) 6 - 23 mg/dL    Creatinine 1.21 (H) 0.50 - 1.05 mg/dL    eGFR 44 (L) >60 mL/min/1.73m*2    Calcium 9.8 8.6 - 10.3 mg/dL    Albumin 4.4 3.4 - 5.0 g/dL    Alkaline Phosphatase 50 33 - 136 U/L    Total Protein 7.5 6.4 - 8.2 g/dL    AST 25 9 - 39 U/L    Bilirubin, Total 0.5 0.0 - 1.2 mg/dL    ALT 15 7 - 45 U/L   Magnesium   Result Value Ref Range    Magnesium 2.39 1.60 - 2.40 mg/dL   Troponin I, High Sensitivity, Initial   Result Value Ref Range    Troponin I, High Sensitivity 29 (H) 0 - 13 ng/L   Blood Gas Venous Full Panel Unsolicited   Result Value Ref Range    POCT pH, Venous 7.40 7.33 - 7.43 pH    POCT pCO2, Venous 45 41 - 51 mm Hg    POCT pO2, Venous 25 (L) 35 - 45 mm Hg    POCT SO2, Venous 36 (L) 45 - 75 %    POCT Oxy Hemoglobin, Venous 35.4 (L) 45.0 - 75.0 %    POCT Hematocrit Calculated, Venous 48.0 (H) 36.0 - 46.0 %    POCT Sodium, Venous 133 (L) 136 - 145 mmol/L    POCT Potassium, Venous 5.2 3.5 - 5.3 mmol/L    POCT Chloride, Venous 103 98 - 107 mmol/L    POCT Ionized Calicum, Venous 1.20 1.10 - 1.33 mmol/L    POCT Glucose, Venous 141 (H) 74 - 99 mg/dL    POCT Lactate, Venous 1.8 0.4 - 2.0 mmol/L     POCT Base Excess, Venous 2.4 -2.0 - 3.0 mmol/L    POCT HCO3 Calculated, Venous 27.9 (H) 22.0 - 26.0 mmol/L    POCT Hemoglobin, Venous 16.0 12.0 - 16.0 g/dL    POCT Anion Gap, Venous 7.0 (L) 10.0 - 25.0 mmol/L    Patient Temperature          Imaging:   Imaging  XR chest 1 view  Result Date: 7/14/2025  1.  Increasing bilateral reticular opacities which are suggestive of interstitial edema or pneumonitis.     Signed by: Ghassan Benítez 7/14/2025 5:58 PM Dictation workstation:   [id number]      Cardiology, Vascular, and Other Imaging  No other imaging results found for the past 7 days       Assessment/Plan     Neuro:  #Encephalopathy  - Likely secondary to poor perfusion due to hypoxia  - Sedation: Fentanyl drip  - Following commands and nodding to questioning at presentation in the ICU  - [facility] ICU    Cardiac:  #TAVR Failure  #Cardiogenic shock  #Acute severe aortic regurgitation  - EKG in the emergency department demonstrated ST elevations in the inferior leads with diffuse reciprocal changes concerning for STEMI.  However, there was no evidence of coronary artery occlusion on cardiac catheterization.  Ultimate diagnosis of TAVR failure with acute severe aortic regurgitation.  - Initial troponin elevated at 29, repeat of 1354.  Repeat pending.  - Interventional cardiology consulted the shock he may [facility].  Given multiple risk factors listed above, they cannot offer any further intervention at this time.  Recommendations for palliative measures.  - Continuous cardiac monitoring    Pulmonary:  #Acute hypoxic respiratory failure  #Pulmonary edema  - Likely in the setting of acute severe aortic regurgitation  - Intubated in Cath Lab, currently requiring significant respiratory support with PEEP of 8 and FiO2 of 80%  - Notable rhonchi in the bilateral lower lobes  - Family opting for likely terminal extubation and comfort measures at this time  Vent Mode: Volume control/assist control  S RR:  [20] 20  S VT:  [400 mL] 400  mL  PEEP/CPAP (cm H2O):  [5 cm H20-8 cm H20] 8 cm H20  MAP (cm H2O):  [9.6-9.9] 9.9     Gastrointestinal:  - No acute issues  - Diet: NPO  - Last BM:      Renal:  #CKD  - No acute issues  - Creatinine at baseline at presentation  - Daily metabolic panel, Mg, phos  - Replete electrolytes as indicated  - I's & O's  Net IO Since Admission: 24.75 mL [25]    Hematology:  #Possible valvular thrombus  - As discussed by cardiology with shock team, no intervention at this time  - Daily CBC  - DVT Prophylaxis: heparin, SCDs    Infectious Disease:  -No acute issues  Temp (24hrs), Av.5 °C (97.7 °F), Min:36.5 °C (97.7 °F), Max:36.5 °C (97.7 °F)    Endocrine:  - Accuchecks, SSI  -BG < 180 goal    CODE STATUS: No Order    Disposition: ICU    Attending Addendum:     reviewed the resident/fellow's documentation and discussed the patient with the resident/fellow. I agree with the resident/fellow's medical decision making as documented in the note.            [1]    [2] EPINEPHrine, 0-1 mcg/kg/min, Last Rate: Stopped (25)  fentaNYL,  mcg/hr, Last Rate: 50 mcg/hr (25)  [3] PRN medications: oxygen

## 2025-07-14 NOTE — ED PROVIDER NOTES
"Emergency Department Provider Note        History of Present Illness     History provided by: Patient and EMS  Limitations to History: {History Limitations:84505::\"None\"}  External Records Reviewed with Brief Summary: {ED External Records Reviewed with Brief Summary:85120::\"None\"}    HPI:  Betty Stein is a 85 y.o. female ***    Physical Exam   Triage vitals:  T 36.5 °C (97.7 °F)  HR 80  /61  RR 20  O2 99 % Supplemental oxygen    {ED Physical Exam:04359}    Medical Decision Making & ED Course   Medical Decision Makin y.o. female ***  ----  {Scoring Tools Utilized:15791}    Differential diagnoses considered include but are not limited to: ***     Social Determinants of Health which Significantly Impact Care: {Social Determinants of Health which Significantly Impact Care:10474::\"None identified\"} {The following actions were taken to address these social determinants:56492}    EKG Independent Interpretation: {EKG Independent Interpretation:39970}    Independent Result Review and Interpretation: {Independent Result Review and Interpretation:78788::\"Relevant laboratory and radiographic results were reviewed and independently interpreted by myself.  As necessary, they are commented on in the ED Course.\"}    Chronic conditions affecting the patient's care: {Chronic conditions affecting the patient's care:14032::\"As documented above in MDM\"}    The patient was discussed with the following consultants/services: {The patient was discussed with the following consultants/services:27998::\"None\"}    Care Considerations: {Care Considerations:85337::\"As documented above in MDM\"}    ED Course:     Disposition   {ED Disposition:72908}    Procedures   Critical Care    Performed by: Demetris Brown DO  Authorized by: Demetris Brown DO    Critical care provider statement:     Critical care time (minutes):  33    Critical care time was exclusive of:  Separately billable procedures and treating other patients and " teaching time    Critical care was necessary to treat or prevent imminent or life-threatening deterioration of the following conditions:  Cardiac failure    Critical care was time spent personally by me on the following activities:  Ordering and performing treatments and interventions, ordering and review of laboratory studies, ordering and review of radiographic studies, pulse oximetry, re-evaluation of patient's condition, review of old charts, obtaining history from patient or surrogate, examination of patient, evaluation of patient's response to treatment, development of treatment plan with patient or surrogate and discussions with consultants  Comments:      Critical care time for acute ST elevation myocardial infarction.      {ED Provider Level (Optional):96978}    Demetris Brown, DO  Emergency Medicine    The patient was seen by the resident/fellow.  I have personally performed a substantive portion of the encounter.  I have seen and examined the patient; agree with the workup, evaluation, MDM,   management and diagnosis.  The care plan has been discussed with the resident; I have reviewed the resident’s note and agree with the documented findings.      This is a 85 y.o. female to ER with complaint of chest pain.  Pains are about an hour prior to arrival.  No other complaints.  Only radiates to her upper chest.  Does not radiate to her extremities.  She was given aspirin by EMS.  Her O2 saturation was 89% for EMS.  She is not on home oxygen normally.  Initially patient only reported having a history of hypertension.  Upon medical record review she does have hyperlipidemia.  She had an RCA stent in 2013.  She had aortic valve TAVR replacement.  Last EF was 60% in 2024.    She is denying any similar symptoms in the past.  She denies any nausea or vomiting patient has any numbness or tingling.  She denies any shortness of breath.    O2 saturation did drop when she was taken off of the supplemental oxygen.  She  ended up going on a nonrebreather and her O2 saturation was 100% then.    Lungs you have some crackles.  Heart is regular.  Abdomen is soft and nontender.    Code STEMI was called prior to patient arrival.  EMS is having issues with EKG.  EKG showed some inferior depressions and possible elevation in V1.    EKG here.  Normal sinus rhythm with a rate of 81.  NE interval 134.  QTc 408.  There is now ST elevations inferiorly.  There are some lateral depressions.  EKG was immediately sent to cardiology and he agrees this is a STEMI.  Patient is given heparin and Brilinta.

## 2025-07-14 NOTE — PROCEDURES
Intubation    Date/Time: 7/14/2025 6:14 PM    Performed by: Juni Robledo MD  Authorized by: Mohinder Espinoza MD    Consent:     Consent obtained:  Emergent situation and verbal    Consent given by:  Spouse and patient    Risks, benefits, and alternatives were discussed: yes      Alternatives discussed:  Observation, delayed treatment and no treatment  Universal protocol:     Procedure explained and questions answered to patient or proxy's satisfaction: yes      Relevant documents present and verified: yes      Test results available: yes      Imaging studies available: yes      Required blood products, implants, devices, and special equipment available: yes      Immediately prior to procedure, a time out was called: yes      Patient identity confirmed:  Arm band, hospital-assigned identification number and verbally with patient  Pre-procedure details:     Indications: respiratory distress and respiratory failure      Patient status:  Awake    Mouth opening - incisor distance:  3 or more finger widths    Hyoid-mental distance: 3 or more finger widths      Hyoid-thyroid distance: 2 or more finger widths      Mallampati score:  II    Obstruction: none      Neck mobility: normal      Pharmacologic strategy: RSI      Induction agents:  Etomidate    Paralytics:  Succinylcholine  Procedure details:     Preoxygenation:  Nonrebreather mask    CPR in progress: no      Number of attempts:  1  Successful intubation attempt details:     Intubation method:  Oral    Intubation technique: endoscope assisted      Laryngoscope blade:  Mac 4 and Mac 3    Bougie used: no      Grade view: I      Tube size (mm):  7.5    Tube type:  Cuffed    Tube visualized through cords: yes    Placement assessment:     ETT at teeth/gumline (cm):  24    Tube secured with:  ETT dorsey    Breath sounds:  Equal    Placement verification: chest rise, colorimetric ETCO2 and endoscopic    Post-procedure details:     Procedure completion:   Tolerated  Comments:      Permit was implied secondary to emergent situation. A MAC 3 blade was inserted into the oropharynx at which time the vocal cords were visualized. A 7.5-Turkmen endotracheal tube was inserted and visualized going through the vocal cords. The stylette was removed. Colorimetric change was visualized on the CO2 meter. Breath sounds were heard in both lung fields equally. The endotracheal tube was placed at 24 cm, measured at the teeth.    Juni Robledo M.D.  Internal Medicine PGY-3

## 2025-07-14 NOTE — Clinical Note
Sheath was exchanged with ACCESS KIT, S-BOUBACAR MINI, 4FR 10CM 0.018IN 40CM, NT/PT, ECHO ENHANCE NEEDLE.

## 2025-07-14 NOTE — SIGNIFICANT EVENT
Shock Team Multidisciplinary Meeting Note    This note reflects a summary of a case conference discussion between a multidisciplinary team of interventional cardiologists, cardiac surgeons, advanced heart failure specialists, and critical care specialists. The suggestions and impressions in this note reflect group consensus opinion but do not substitute bedside evaluation and management by the primary team.       Referring Provider: Dr. Espinoza   Referring Facility: [facility]     Attendees:   CICU Attending  Dr. Loja  Interventional Cardiology Attending  Dr. Gillombardo   CICU Fellow  Dr. Reynoso  Cardiac Surgery Attending  Dr. Isaiah MANCIAU Attending  Dr. Palacios Advanced Heart Failure Attending  Dr. KEVON Blakely     Brief Clinical Summary:  Clinical presentation: Betty Stein is a 85 y.o. year old female patient with PMH of aortic stenosis /p TAVR, HTN, HLD, CAD who presented in setting of SOB. Was found to have wide pulse pressure. EKG concerning for inferior HERMAN. Cor angio w/o any culprit lesions. Dr. Espinoza suspecting acute AR as leading cause for decompensation. Patient is intubated. BP on epi 80/20.       Group consensus/recommendations:  [facility] Shock Guidelines for further reference can be obtained by clicking this link when on the intranet.  - Given acute AR and advanced age patient would not be an appropriate candidate for advanced therapies. GOC discussions were encouraged by the shock team given limited options to help in the acute AR setting. Dr. Espinoza to discuss options with patient's family.     To contact [facility] Shock Team please contact [facility] [phone]

## 2025-07-14 NOTE — Clinical Note
CONVERSATION WITH SHOCK TEAM. PAUSE IN PROCEDURE. PER MD PLAN TO HAVE PATIENTS  COME IN TO TALK WITH HER WHILE SHE MAY STILL BE ABLE TO HEAR HIM.

## 2025-07-14 NOTE — ED PROVIDER NOTES
Emergency Department Provider Note        History of Present Illness     History provided by: Patient and EMS  Limitations to History: None  External Records Reviewed with Brief Summary: None    HPI:  Betty Stein is a 85 y.o. female past medical history significant for hyperlipidemia, hypertension, aortic valve replacement presented to the emergency department due to concern for ST segment elevation myocardial infarction.  STEMI alert was called prior to arrival, EKG was obtained immediately.  Patient is endorsing chest pain that started roughly 1 hour ago when she was just sitting.  She does endorse some shortness of breath associated with it but no radiation of pain anywhere.  Patient received full dose aspirin en route.    Physical Exam   Triage vitals:  T 36.5 °C (97.7 °F)  HR 80  /61  RR (!) 30  O2 (!) 77 % Supplemental oxygen    Physical Exam  Vitals reviewed.   Constitutional:       Appearance: She is well-developed.   HENT:      Head: Normocephalic.   Cardiovascular:      Rate and Rhythm: Normal rate and regular rhythm.      Heart sounds: Murmur heard.   Pulmonary:      Effort: Pulmonary effort is normal.      Breath sounds: Rhonchi and rales present.   Abdominal:      General: Bowel sounds are normal.      Palpations: Abdomen is soft.   Musculoskeletal:         General: Normal range of motion.      Cervical back: Normal range of motion.   Skin:     General: Skin is warm.      Capillary Refill: Capillary refill takes less than 2 seconds.   Neurological:      General: No focal deficit present.      Mental Status: She is alert.          Medical Decision Making & ED Course   Medical Decision Makin y.o. female presenting to the emergency department with chest pain.  STEMI alert was called prior to arrival given ST segment depressions in inferior leads.  On arrival twelve-lead EKG was obtained showing ST segment elevation in lead III aVR, V1 and diffuse depressions.  Aspirin already  administered, Brilinta and heparin administered.  Workup including CBC, CMP, serial troponin, mag chest x-ray obtained.  Chest x-ray noting interstitial edema.  Patient placed on nonrebreather.  Patient taken emergently to cath.  Patient intubated in Cath Lab and admitted to the ICU.  ----    Differential diagnoses considered include but are not limited to: STEMI, NSTEMI, aortic valve failure, pericarditis, myocarditis, ventricular rupture, tamponade     Social Determinants of Health which Significantly Impact Care: None identified     EKG Independent Interpretation: EKG interpreted by myself. Please see ED Course for full interpretation.    Independent Result Review and Interpretation: Relevant laboratory and radiographic results were reviewed and independently interpreted by myself.  As necessary, they are commented on in the ED Course.    Chronic conditions affecting the patient's care: As documented above in [facility]    The patient was discussed with the following consultants/services: Interventional cardiology for cath and ICU team for admission    Care Considerations: As documented above in [facility]    ED Course:  ED Course as of 07/14/25 2116 Mon Jul 14, 2025 1733 EKG on my interpretation showing ST segment elevation in lead III and aVR with significant depressions in lead I, aVL, V3, V4, V5 with elevations in lead V1 as well.  Otherwise patient is showing normal sinus rhythm [IS]      ED Course User Index  [IS] Ezekiel Smith MD         Diagnoses as of 07/14/25 2116   ST elevation myocardial infarction (STEMI), unspecified artery (Multi)   Prosthetic heart valve failure, initial encounter     Disposition   Admission to ICU following catheterization    Procedures   Procedures    Patient seen and discussed with ED attending physician.    Ezekiel Smith MD  Emergency Medicine     Ezekiel Smith MD  Resident  07/14/25 2115       Ezekiel Smith MD  Resident  07/14/25 2116

## 2025-07-15 NOTE — ED PROCEDURE NOTE
Procedure  Critical Care    Performed by: Demetris Brown DO  Authorized by: Demetris Brown DO    Critical care provider statement:     Critical care time (minutes):  38    Critical care time was exclusive of:  Separately billable procedures and treating other patients and teaching time    Critical care was necessary to treat or prevent imminent or life-threatening deterioration of the following conditions:  Cardiac failure    Critical care was time spent personally by me on the following activities:  Ordering and performing treatments and interventions, ordering and review of laboratory studies, ordering and review of radiographic studies, pulse oximetry, re-evaluation of patient's condition, review of old charts, obtaining history from patient or surrogate, examination of patient, evaluation of patient's response to treatment and development of treatment plan with patient or surrogate  Comments:      Critical care time due to patient having ST elevation myocardial infarction and developing acute heart failure requiring intubation.               Demetris Brown DO  07/15/25 0950

## 2025-07-15 NOTE — CONSULTS
Inpatient consult to Cardiology  Consult performed by: Kolby Cedeño MD  Consult ordered by: ELAINA Velasquez-CNP  Reason for consult: severe AR in setting of TAVR 2019  Assessment/Recommendations:     Impressions:        History Of Present Illness:    Betty Stein is a 85 y.o. female presenting with inferior STEMI; acute, severe aortic regurgitation, shock.    Primary cardiologist: Amisha Phelps    This 85-year-old hypertensive, hyperlipidemic woman has a history of coronary artery disease.  According to available notes in the electronic medical record, she reportedly had PCI of an unknown coronary artery in 2004.  She has a history of valvular heart disease and underwent TAVR for severe aortic stenosis in March 2019 with a 26 mm Evolut Pro prosthesis.    Two days ago she and her  had dinner with her sister and brother-in-law and the patient felt quite well.  Around 2 PM yesterday she developed severe tightness in her chest.  The patient's  checked her blood pressure and noticed it was 120/45.  He was very concerned about the low diastolic pressure and called 911.  She was brought to the hospital for further evaluation and care.  Initial presentation EKG disclosed evidence of ST elevations in the inferior leads.  The cardiac Cath Lab team was activated.  She was taken for urgent coronary angiography by Dr. Espinoza.  She was found to have no significant coronary artery stenosis.  She had to be intubated during the procedure and developed cardiogenic shock.  She had a widened pulse pressure and evidence of severe aortic regurgitation.  She was admitted to the intensive care unit.  Placement of an Impella device could not be performed because of her aortic regurgitation and the aortic valve could not be well-seen.  Dr. Espinoza spoke with the patient's  about the escalation of care.  She was extubated and her pressors were discontinued.  She recovered after extubation with stable  blood pressures albeit with widened pulse pressure and physical exa and echocardiographic  stigmata of severe aortic regurgitation.    We were to do an urgent transesophageal echocardiographic study today to further interrogate the pathoanatomy of the aortic valve, but the procedure was postponed because there was some question as to whether she may have had something to eat earlier in the day.    At present she is resting comfortably in bed and denies acute cardiac related symptoms.  Her symptoms of chest discomfort have resolved.  She has not had dyspnea, palpitations, orthopnea, PND, or other symptoms today.    Of note, she denies symptoms of fevers, chills, night sweats, and other infectious symptoms.     Last Recorded Vitals:  Vitals:    07/15/25 1300 07/15/25 1400 07/15/25 1500 07/15/25 1600   BP: (!) 183/54 158/58 119/52    BP Location: Right arm Right arm     Patient Position: Sitting Sitting     Pulse: 92 94 92 (!) 112   Resp: 24 20 15 (!) 32   Temp: 36.7 °C (98.1 °F) 36.6 °C (97.9 °F)     TempSrc: Temporal Temporal     SpO2: 96% 96% 96% 97%   Weight:       Height:           Last Labs:    Results from last 7 days   Lab Units 07/15/25  0836 07/15/25  0439   SODIUM mmol/L  --  138   POTASSIUM mmol/L  --  4.3   CHLORIDE mmol/L  --  103   CO2 mmol/L  --  23   BUN mg/dL  --  39*   CREATININE mg/dL  --  1.35*   CALCIUM mg/dL  --  9.2   PROTEIN TOTAL g/dL 6.6 6.5   BILIRUBIN TOTAL mg/dL 0.7 0.7   ALK PHOS U/L 46 48   ALT U/L 15 16   AST U/L 69* 59*   GLUCOSE mg/dL  --  122*        Results for orders placed or performed during the hospital encounter of 07/14/25 (from the past 24 hours)   CBC and Auto Differential   Result Value Ref Range    WBC 6.9 4.4 - 11.3 x10*3/uL    nRBC 0.0 0.0 - 0.0 /100 WBCs    RBC 4.95 4.00 - 5.20 x10*6/uL    Hemoglobin 15.8 12.0 - 16.0 g/dL    Hematocrit 47.8 (H) 36.0 - 46.0 %    MCV 97 80 - 100 fL    MCH 31.9 26.0 - 34.0 pg    MCHC 33.1 32.0 - 36.0 g/dL    RDW 12.2 11.5 - 14.5 %     Platelets 228 150 - 450 x10*3/uL    Neutrophils % 64.2 40.0 - 80.0 %    Immature Granulocytes %, Automated 0.3 0.0 - 0.9 %    Lymphocytes % 26.8 13.0 - 44.0 %    Monocytes % 5.8 2.0 - 10.0 %    Eosinophils % 2.3 0.0 - 6.0 %    Basophils % 0.6 0.0 - 2.0 %    Neutrophils Absolute 4.45 1.60 - 5.50 x10*3/uL    Immature Granulocytes Absolute, Automated 0.02 0.00 - 0.50 x10*3/uL    Lymphocytes Absolute 1.86 0.80 - 3.00 x10*3/uL    Monocytes Absolute 0.40 0.05 - 0.80 x10*3/uL    Eosinophils Absolute 0.16 0.00 - 0.40 x10*3/uL    Basophils Absolute 0.04 0.00 - 0.10 x10*3/uL   Comprehensive Metabolic Panel   Result Value Ref Range    Glucose 129 (H) 74 - 99 mg/dL    Sodium 138 136 - 145 mmol/L    Potassium 4.2 3.5 - 5.3 mmol/L    Chloride 101 98 - 107 mmol/L    Bicarbonate 27 21 - 32 mmol/L    Anion Gap 14 10 - 20 mmol/L    Urea Nitrogen 34 (H) 6 - 23 mg/dL    Creatinine 1.21 (H) 0.50 - 1.05 mg/dL    eGFR 44 (L) >60 mL/min/1.73m*2    Calcium 9.8 8.6 - 10.3 mg/dL    Albumin 4.4 3.4 - 5.0 g/dL    Alkaline Phosphatase 50 33 - 136 U/L    Total Protein 7.5 6.4 - 8.2 g/dL    AST 25 9 - 39 U/L    Bilirubin, Total 0.5 0.0 - 1.2 mg/dL    ALT 15 7 - 45 U/L   Magnesium   Result Value Ref Range    Magnesium 2.39 1.60 - 2.40 mg/dL   Troponin I, High Sensitivity, Initial   Result Value Ref Range    Troponin I, High Sensitivity 29 (H) 0 - 13 ng/L   Blood Gas Venous Full Panel Unsolicited   Result Value Ref Range    POCT pH, Venous 7.40 7.33 - 7.43 pH    POCT pCO2, Venous 45 41 - 51 mm Hg    POCT pO2, Venous 25 (L) 35 - 45 mm Hg    POCT SO2, Venous 36 (L) 45 - 75 %    POCT Oxy Hemoglobin, Venous 35.4 (L) 45.0 - 75.0 %    POCT Hematocrit Calculated, Venous 48.0 (H) 36.0 - 46.0 %    POCT Sodium, Venous 133 (L) 136 - 145 mmol/L    POCT Potassium, Venous 5.2 3.5 - 5.3 mmol/L    POCT Chloride, Venous 103 98 - 107 mmol/L    POCT Ionized Calicum, Venous 1.20 1.10 - 1.33 mmol/L    POCT Glucose, Venous 141 (H) 74 - 99 mg/dL    POCT Lactate, Venous  1.8 0.4 - 2.0 mmol/L    POCT Base Excess, Venous 2.4 -2.0 - 3.0 mmol/L    POCT HCO3 Calculated, Venous 27.9 (H) 22.0 - 26.0 mmol/L    POCT Hemoglobin, Venous 16.0 12.0 - 16.0 g/dL    POCT Anion Gap, Venous 7.0 (L) 10.0 - 25.0 mmol/L    Patient Temperature     ECG 12 lead   Result Value Ref Range    Ventricular Rate 81 BPM    Atrial Rate 81 BPM    MS Interval 134 ms    QRS Duration 76 ms    QT Interval 352 ms    QTC Calculation(Bazett) 408 ms    P Axis 1 degrees    R Axis 2 degrees    T Axis 162 degrees    QRS Count 13 beats    Q Onset 224 ms    P Onset 157 ms    P Offset 216 ms    T Offset 400 ms    QTC Fredericia 388 ms   Troponin, High Sensitivity, 1 Hour   Result Value Ref Range    Troponin I, High Sensitivity 1,354 (HH) 0 - 13 ng/L   CBC and Auto Differential   Result Value Ref Range    WBC 17.9 (H) 4.4 - 11.3 x10*3/uL    nRBC 0.0 0.0 - 0.0 /100 WBCs    RBC 5.30 (H) 4.00 - 5.20 x10*6/uL    Hemoglobin 16.9 (H) 12.0 - 16.0 g/dL    Hematocrit 53.0 (H) 36.0 - 46.0 %     80 - 100 fL    MCH 31.9 26.0 - 34.0 pg    MCHC 31.9 (L) 32.0 - 36.0 g/dL    RDW 12.1 11.5 - 14.5 %    Platelets 183 150 - 450 x10*3/uL    Neutrophils % 80.4 40.0 - 80.0 %    Immature Granulocytes %, Automated 0.5 0.0 - 0.9 %    Lymphocytes % 7.8 13.0 - 44.0 %    Monocytes % 7.6 2.0 - 10.0 %    Eosinophils % 3.4 0.0 - 6.0 %    Basophils % 0.3 0.0 - 2.0 %    Neutrophils Absolute 14.43 (H) 1.60 - 5.50 x10*3/uL    Immature Granulocytes Absolute, Automated 0.09 0.00 - 0.50 x10*3/uL    Lymphocytes Absolute 1.39 0.80 - 3.00 x10*3/uL    Monocytes Absolute 1.36 (H) 0.05 - 0.80 x10*3/uL    Eosinophils Absolute 0.61 (H) 0.00 - 0.40 x10*3/uL    Basophils Absolute 0.05 0.00 - 0.10 x10*3/uL   Comprehensive metabolic panel   Result Value Ref Range    Glucose 122 (H) 74 - 99 mg/dL    Sodium 138 136 - 145 mmol/L    Potassium 4.3 3.5 - 5.3 mmol/L    Chloride 103 98 - 107 mmol/L    Bicarbonate 23 21 - 32 mmol/L    Anion Gap 16 10 - 20 mmol/L    Urea Nitrogen 39  (H) 6 - 23 mg/dL    Creatinine 1.35 (H) 0.50 - 1.05 mg/dL    eGFR 38 (L) >60 mL/min/1.73m*2    Calcium 9.2 8.6 - 10.3 mg/dL    Albumin 3.7 3.4 - 5.0 g/dL    Alkaline Phosphatase 48 33 - 136 U/L    Total Protein 6.5 6.4 - 8.2 g/dL    AST 59 (H) 9 - 39 U/L    Bilirubin, Total 0.7 0.0 - 1.2 mg/dL    ALT 16 7 - 45 U/L   Magnesium   Result Value Ref Range    Magnesium 2.48 (H) 1.60 - 2.40 mg/dL   Phosphorus   Result Value Ref Range    Phosphorus 5.1 (H) 2.5 - 4.9 mg/dL   Troponin I, High Sensitivity   Result Value Ref Range    Troponin I, High Sensitivity 4,624 (HH) 0 - 13 ng/L   B-type natriuretic peptide   Result Value Ref Range     (H) 0 - 99 pg/mL   Morphology   Result Value Ref Range    RBC Morphology See Below     Giant Platelets Few    POCT GLUCOSE   Result Value Ref Range    POCT Glucose 105 (H) 74 - 99 mg/dL   Troponin I, High Sensitivity   Result Value Ref Range    Troponin I, High Sensitivity 6,251 (HH) 0 - 13 ng/L   Procalcitonin   Result Value Ref Range    Procalcitonin 0.14 (H) <=0.07 ng/mL   Hepatic function panel   Result Value Ref Range    Albumin 3.6 3.4 - 5.0 g/dL    Bilirubin, Total 0.7 0.0 - 1.2 mg/dL    Bilirubin, Direct 0.2 0.0 - 0.3 mg/dL    Alkaline Phosphatase 46 33 - 136 U/L    ALT 15 7 - 45 U/L    AST 69 (H) 9 - 39 U/L    Total Protein 6.6 6.4 - 8.2 g/dL   Transthoracic Echo Complete   Result Value Ref Range    AV pk michelle 1.79 m/s    LV Biplane EF 65 %    LVOT diam 1.70 cm    MV E/A ratio 1.58     Tricuspid annular plane systolic excursion 2.3 cm    LA vol index A/L 31.4 ml/m2    AV mn grad 3 mmHg    LV EF 65 %    RV free wall pk S' 14.60 cm/s    RVSP 45 mmHg    LVIDd 4.60 cm    Aortic Valve Area by Continuity of Peak Velocity 1.22 cm2    AV pk grad 13 mmHg    Aortic Valve Area by Continuity of VTI 2.02 cm2    LV A4C EF 67.0    Lactate   Result Value Ref Range    Lactate 2.0 0.4 - 2.0 mmol/L   ECG 12 Lead   Result Value Ref Range    Ventricular Rate 90 BPM    Atrial Rate 90 BPM    IL  Interval 122 ms    QRS Duration 74 ms    QT Interval 386 ms    QTC Calculation(Bazett) 472 ms    P Axis 74 degrees    R Axis 61 degrees    T Axis 53 degrees    QRS Count 15 beats    Q Onset 223 ms    P Onset 162 ms    P Offset 212 ms    T Offset 416 ms    QTC Fredericia 441 ms   POCT GLUCOSE   Result Value Ref Range    POCT Glucose 112 (H) 74 - 99 mg/dL   Sars-CoV-2, Influenza A/B and RSV PCR   Result Value Ref Range    Coronavirus 2019, PCR Not Detected Not Detected    Flu A Result Not Detected Not Detected    Flu B Result Not Detected Not Detected    RSV PCR Not Detected Not Detected   Troponin I, High Sensitivity   Result Value Ref Range    Troponin I, High Sensitivity 7,756 (HH) 0 - 13 ng/L   Magnesium   Result Value Ref Range    Magnesium 2.39 1.60 - 2.40 mg/dL         PTT - No results in last year.  _   _ _     Troponin I, High Sensitivity   Date/Time Value Ref Range Status   07/15/2025 03:01 PM 7,756 (HH) 0 - 13 ng/L Final     Comment:     Previous result verified on 7/14/2025 2037 on specimen/case [id number] called with component TRPHS for procedure Troponin, High Sensitivity, 1 Hour with value 1,354 ng/L.   07/15/2025 08:36 AM 6,251 (HH) 0 - 13 ng/L Final     Comment:     Previous result verified on 7/14/2025 2037 on specimen/case [id number] called with component TRPHS for procedure Troponin, High Sensitivity, 1 Hour with value 1,354 ng/L.   07/15/2025 04:39 AM 4,624 (HH) 0 - 13 ng/L Final     Comment:     Previous result verified on 7/14/2025 2037 on specimen/case [id number] called with component TRPHS for procedure Troponin, High Sensitivity, 1 Hour with value 1,354 ng/L.     BNP   Date/Time Value Ref Range Status   07/15/2025 04:39  (H) 0 - 99 pg/mL Final   06/18/2025 01:50  (H) 0 - 99 pg/mL Final     LDL Calculated   Date/Time Value Ref Range Status   10/25/2024 08:15  (H) <=99 mg/dL Final     Comment:                                 Near   Borderline      AGE       Desirable  Optimal    High     High     Very High     0-19 Y     0 - 109     ---    110-129   >/= 130     ----    20-24 Y     0 - 119     ---    120-159   >/= 160     ----      >24 Y     0 -  99   100-129  130-159   160-189     >/=190     10/30/2023 09:10 AM 88 <=99 mg/dL Final     Comment:                                 Near   Borderline      AGE      Desirable  Optimal    High     High     Very High     0-19 Y     0 - 109     ---    110-129   >/= 130     ----    20-24 Y     0 - 119     ---    120-159   >/= 160     ----      >24 Y     0 -  99   100-129  130-159   160-189     >/=190       VLDL   Date/Time Value Ref Range Status   10/25/2024 08:15 AM 29 0 - 40 mg/dL Final   10/30/2023 09:10 AM 27 0 - 40 mg/dL Final   09/09/2022 08:24 AM 26 0 - 40 mg/dL Final   10/15/2021 07:42 AM 26 0 - 40 mg/dL Final   12/30/2020 08:08 AM 24 0 - 40 mg/dL Final      Last I/O:  I/O last 3 completed shifts:  In: 38.6 (0.7 mL/kg) [I.V.:38.6 (0.7 mL/kg)]  Out: 5 (0.1 mL/kg) [Blood:5]  Weight: 56.7 kg     Past Medical History:  She has a past medical history of Encounter for examination for normal comparison and control in clinical research program (01/21/2019), Inflamed seborrheic keratosis (05/14/2020), Ingrowing nail (04/15/2021), Nonrheumatic aortic (valve) stenosis (05/10/2019), and Personal history of other diseases of the musculoskeletal system and connective tissue (11/11/2021).    Past Surgical History:  She has a past surgical history that includes Other surgical history (06/29/2018); Other surgical history (11/11/2021); Other surgical history (11/11/2021); Other surgical history (11/11/2021); Other surgical history (11/11/2021); Other surgical history (11/11/2021); Other surgical history (11/11/2021); Other surgical history (11/11/2021); Other surgical history (11/18/2021); Cardiac catheterization (N/A, 7/14/2025); and Cardiac catheterization (N/A, 7/14/2025).      Social History:  She reports that she has never smoked. She  has never been exposed to tobacco smoke. She has never used smokeless tobacco. She reports that she does not currently use alcohol after a past usage of about 1.0 standard drink of alcohol per week. She reports that she does not use drugs.    Family History:  Family History[1]     Review of Systems:  ROS is positive for constipation.  Patient denies fevers, chills, nausea, vomiting, diarrhea,  hematuria, dysuria, abdominal pain,  red blood per rectum, cough; all other review of systems is negative.    Allergies:  Patient has no known allergies.    Inpatient Medications:  Scheduled Medications[2]  PRN Medications[3]  Continuous Medications[4]  Outpatient Medications:  Current Outpatient Medications   Medication Instructions    ALPRAZolam (XANAX) 0.25 mg, oral, 2 times daily PRN    amLODIPine (NORVASC) 5 mg, oral, Daily    aspirin 81 mg, oral, Daily    atorvastatin (LIPITOR) 20 mg, oral, Nightly    busPIRone (BUSPAR) 5 mg, oral, 2 times daily    cholecalciferol (VITAMIN D-3) 25 mcg, Daily    dorzolamide-timoloL (Cosopt) 22.3-6.8 mg/mL ophthalmic solution 1 drop, 2 times daily    ezetimibe (ZETIA) 10 mg, oral, Nightly    hydrocortisone 2.5 % lotion 1 Application, 2 times daily    irbesartan-hydrochlorothiazide (Avalide) 150-12.5 mg tablet 1 tablet, oral, Daily    latanoprost (Xalatan) 0.005 % ophthalmic solution 1 drop, Nightly    mirtazapine (REMERON) 7.5 mg, oral, Nightly    multivit-min/ferrous fumarate (MULTI VITAMIN ORAL) 1 tablet, Daily    nitroglycerin (NITROSTAT) 0.4 mg, sublingual, Every 5 min PRN, TAKE PER DIRECTED    pentoxifylline (TRENTAL) 400 mg, 3 times daily (morning, midday, late afternoon)       Current Imaging  Imaging  XR chest 1 view  Result Date: 7/15/2025  Increasing bibasilar atelectasis or infiltrates. Pulmonary vascular congestion persists. Possible enlarging pleural effusions.   MACRO: None   Signed by: Americo Kendrick 7/15/2025 10:46 AM Dictation workstation:   [id number]    XR chest 1  view  Result Date: 7/14/2025  1.  Increasing bilateral reticular opacities which are suggestive of interstitial edema or pneumonitis.     Signed by: Ghassan Benítez 7/14/2025 5:58 PM Dictation workstation:   [id number]      Cardiology, Vascular, and Other Imaging  ECG 12 Lead  Result Date: 7/15/2025  Sinus rhythm with Premature supraventricular complexes Nonspecific ST abnormality Abnormal ECG When compared with ECG of 14-JUL-2025 17:19, (unconfirmed) Significant changes have occurred    Transthoracic Echo Complete  Result Date: 7/15/2025            [facility] - [place], [place] [address] [facility] [id number]    Tel [phone] Fax [phone] TRANSTHORACIC ECHOCARDIOGRAM REPORT Patient Name:       KAYLEE DEL CIDERS     Reading Physician:    [id number] Mohinder Espinoza MD Study Date:         7/15/2025            Ordering Provider:    [id number] LINDA LAWLER MRN/PID:            [id number]             Fellow: Accession#:         [id number]         Nurse: Date of Birth/Age:  1940 / 85 years Sonographer:          Maribeth Schneider Gender Assigned at  F                    Additional Staff: Birth: Height:             170.18 cm            Admit Date: Weight:             56.70 kg             Admission Status:     Inpatient -                                                                Priority                                                                discharge BSA / BMI:          1.66 m2 / 19.58      Department Location:  [facility] ICU Front                     kg/m2                                      (19-26) Blood Pressure: 124 /43 mmHg Study Type:    TRANSTHORACIC ECHO (TTE) COMPLETE Diagnosis/ICD: Acute respiratory failure with hypoxia-J96.01 Indication:    persistent hypoxia after cath, was thought to have severe aortic                insufficiency CPT Codes:     Echo Complete w Full [id number]  Patient History: Pertinent History: CAD, Hyperlipidemia and HTN. s/p TAVR Evolut Pro 26mm - 2019. Study Detail: The following Echo studies were performed: 2D, M-Mode, Doppler and               color flow. Definity used as a contrast agent for endocardial               border definition. Total contrast used for this procedure was 3 mL               via IV push.  PHYSICIAN INTERPRETATION: Left Ventricle: Left ventricular ejection fraction is normal by visual estimate at 65%. There are no regional wall motion abnormalities. The left ventricular cavity size is normal. Spectral Doppler shows a normal pattern of left ventricular diastolic filling. Left Atrium: The left atrial size is mildly dilated. Right Ventricle: The right ventricle is normal in size. There is normal right ventricular global systolic function. Right Atrium: The right atrial size is normal. Aortic Valve: There is a prosthetic aortic valve present. The aortic valve area by VTI is 2.02 cmï¿½ with a peak velocity of 1.79 m/s. The peak and mean gradients are 13 mmHg and 3 mmHg, respectively, with a dimensionless index of 0.89. There is a Medtronic transcatheter aortic valve prosthesis with a 26 mm reported size. 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 There is severe aortic valve regurgitation. Mitral Valve: The mitral valve is normal in structure. There is moderate mitral annular calcification. There is mild to moderate mitral valve regurgitation. The E Vmax is 1.17 m/s. The mitral regurgitant orifice area is 4 mm2. The mitral regurgitant volume is 6.24 ml. Tricuspid Valve: The tricuspid valve is structurally normal. There is mild to moderate tricuspid regurgitation. The Doppler estimated right ventricular systolic pressure (RVSP) is mildly elevated at 45 mmHg. Pulmonic Valve: The pulmonic valve is structurally normal. There is no indication of pulmonic valve regurgitation. Pericardium: No pericardial effusion noted. Aorta: The aortic root  is normal.  CONCLUSIONS:  1. Left ventricular ejection fraction is normal by visual estimate at 65%.  2. There is moderate mitral annular calcification.  3. Mild to moderate mitral valve regurgitation.  4. Mild to moderate tricuspid regurgitation.  5. The Doppler estimated RVSP is mildly elevated at 45 mmHg.  6. There is a Medtronic transcatheter aortic valve prosthesis with a 26 mm reported size. The peak and mean gradients are 13 mmHg and 3 mmHg respectively.  7. Severe aortic valve regurgitation.  8. 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 QUANTITATIVE DATA SUMMARY:  2D MEASUREMENTS:             Normal Ranges: LAs:             3.50 cm     (2.7-4.0cm) IVSd:            0.80 cm     (0.6-1.1cm) LVPWd:           1.00 cm     (0.6-1.1cm) LVIDd:           4.60 cm     (3.9-5.9cm) LVIDs:           3.20 cm LV Mass Index:   83.2 g/m2 LVEDV Index:     40.41 ml/m2 LV % FS          30.4 %  LEFT ATRIUM:                  Normal Ranges: LA Vol A4C:        52.3 ml    (22+/-6mL/m2) LA Vol A2C:        47.9 ml LA Vol BP:         52.0 ml LA Vol Index A4C:  31.6ml/m2 LA Vol Index A2C:  28.9 ml/m2 LA Vol Index BP:   31.4 ml/m2 LA Area A4C:       17.7 cm2 LA Area A2C:       17.6 cm2 LA Major Axis A4C: 5.1 cm LA Major Axis A2C: 5.5 cm LA Volume Index:   29.0 ml/m2 LA Vol A4C:        47.7 ml LA Vol A2C:        45.0 ml LA Vol Index BSA:  28.0 ml/m2  RIGHT ATRIUM:                 Normal Ranges: RA Vol A4C:        35.3 ml    (8.3-19.5ml) RA Vol Index A4C:  21.3 ml/m2 RA Area A4C:       14.2 cm2 RA Major Axis A4C: 4.9 cm  AORTA MEASUREMENTS:         Normal Ranges: Ao Sinus, d:        2.60 cm (2.1-3.5cm) Ao STJ, d:          2.30 cm (1.7-3.4cm) Asc Ao, d:          3.00 cm (2.1-3.4cm)  LV SYSTOLIC FUNCTION:                      Normal Ranges: EF-A4C View:    67 % (>=55%) EF-A2C View:    65 % EF-Biplane:     65 % EF-Visual:      65 % LV EF Reported: 65 %  LV DIASTOLIC FUNCTION:            Normal Ranges: MV Peak E:             1.17  m/s   (0.7-1.2 m/s) MV Peak A:             0.74 m/s   (0.42-0.7 m/s) E/A Ratio:             1.58       (1.0-2.2) MV e'                  0.053 m/s  (>8.0) MV lateral e'          0.05 m/s MV medial e'           0.06 m/s E/e' Ratio:            21.95      (<8.0) PulmV Sys Santi:         28.30 cm/s PulmV Monk Santi:        39.00 cm/s PulmV S/D Santi:         0.70 PulmV A Revs Santi:      23.60 cm/s PulmV A Revs Dur:      71.00 msec  MITRAL VALVE:          Normal Ranges: MV DT:        132 msec (150-240msec)  MITRAL INSUFFICIENCY:             Normal Ranges: PISA Radius:          0.3 cm MR VTI:               153.00 cm MR Vmax:              533.50 cm/s MR Alias Santi:         38.5 cm/s MR Volume:            6.24 ml MR Flow Rt:           21.77 ml/s MR EROA:              4 mm2  AORTIC VALVE:                      Normal Ranges: AoV Vmax:                1.79 m/s  (<=1.7m/s) AoV Peak P.8 mmHg (<20mmHg) AoV Mean PG:             3.0 mmHg  (1.7-11.5mmHg) LVOT Max Santi:            0.96 m/s  (<=1.1m/s) AoV VTI:                 23.10 cm  (18-25cm) LVOT VTI:                20.60 cm LVOT Diameter:           1.70 cm   (1.8-2.4cm) AoV Area, VTI:           2.02 cm2  (2.5-5.5cm2) AoV Area,Vmax:           1.22 cm2  (2.5-4.5cm2) AoV Dimensionless Index: 0.89  AORTIC INSUFFICIENCY: AI Vmax:       2.28 m/s AI Half-time:  137 msec AI Decel Rate: 414.00 cm/s2  RIGHT VENTRICLE: RV Basal 3.50 cm RV Mid   3.40 cm RV Major 5.1 cm TAPSE:   22.6 mm RV s'    0.15 m/s  TRICUSPID VALVE/RVSP:          Normal Ranges: Peak TR Velocity:     3.23 m/s Est. RA Pressure:     3 mmHg RV Syst Pressure:     45 mmHg  (< 30mmHg) IVC Diam:             1.70 cm  PULMONIC VALVE:          Normal Ranges: PV Accel Time:  79 msec  (>120ms) PV Max Santi:     0.8 m/s  (0.6-0.9m/s) PV Max P.5 mmHg  PULMONARY VEINS: PulmV A Revs Dur: 71.00 msec PulmV A Revs Santi: 23.60 cm/s PulmV Monk Santi:   39.00 cm/s PulmV S/D Santi:    0.70 PulmV Sys Santi:    28.30 cm/s  [id number] Mohinder  Olga KUMAR Electronically signed on 7/15/2025 at 1:21:22 PM  ** Final **     ECG 12 lead  Result Date: 7/15/2025  Normal sinus rhythm Left ventricular hypertrophy with repolarization abnormality ( R in aVL ) Cannot rule out Septal infarct (cited on or before 06-AUG-2019) Inferior infarct , possibly acute ** ** ACUTE MI / STEMI ** ** Consider right ventricular involvement in acute inferior infarct Abnormal ECG When compared with ECG of 18-JUN-2025 00:34, Significant changes have occurred    Cardiac Catheterization Procedure  Result Date: 7/15/2025   [facility], Cath Lab      [id number] [address] Cardiovascular Catheterization Report Patient Name:     KAYLEE HATFIELD    Performing Physician:  Marisela Espinoza MD Study Date:       7/14/2025           Verifying Physician:   Marisela Espinoza MD MRN/PID:          [id number]            Cardiologist/Co-Scrub: Accession#:       [id number]        Ordering Provider:     Marisela ESPINOZA Date of           1940 / 85      Cardiologist: Birth/Age:        years Gender:           F                   Fellow: Encounter#:       [id number]          Surgeon:  Study: Left Heart Cath  Indications: KAYLEE HATFIELD is a 85 year old female who presents with aortic stenosis and a chest pain assessment of typical angina. Acute coronary syndrome - STEMI. Stress test performed: No. CTA performed: NoTed Lenz accessed: No. LVEF Assessed: No. Cardiac arrest: No responsive following cardiac arrest. Cardiac surgical consult: No cardiac surgery not recommended. Cardiovascular Instability: Yes. Cardiogenic shock. Frailty status of patient entering lab: 9 = Terminally ill.  Procedure Description: After infiltration with 2% Lidocaine, the right femoral artery was  cannulated with a modified Seldinger technique. Subsequently a 6 [place] sheath was placed in the right femoral artery. After infiltration of local anesthetic, the right femoral vein was identified with two-dimensional ultrasound. Under direct ultrasound visualization, the right femoral vein was cannulated with a micropuncture technique. A 8 [place] sheath was placed in the vein. Selective coronary catheterization was performed using a 5 Fr and 6 Fr catheter(s) exchanged over a guide wire to cannulate the coronary arteries. A JL 4 tip catheter was used for left coronary injections. A JR 4 tip catheter was used for right coronary injections. Multiple injections of contrast were made into the left and right coronary arteries with angiograms recorded in multiple projections. After completion of the procedure, the arterial sheath was intact, to be removed in the holding area. Post-procedure, the venous sheath was intact, to be removed in the holding area.  Coronary Angiography: The coronary circulation is right dominant.  Left Main Coronary Artery: The left main coronary artery is a normal caliber vessel. The left main arises normally from the left coronary sinus of Valsalva, bifurcates into the LAD and circumflex coronary arteries and gives rise to the ramus intermedius artery. The left main coronary artery showed no significant disease or stenosis greater than 30%.  Left Anterior Descending Coronary Artery Distribution: The left anterior descending coronary artery is a normal caliber vessel. The LAD arises normally from the left main coronary artery, wraps around the apex of the LV, gives rise to the 1st diagonal branch and 2nd diagonal branch. The LAD demonstrated no significant disease or stenosis greater than 30%.  Circumflex Coronary Artery Distribution: The circumflex coronary artery is a normal caliber vessel. The circumflex arises normally from the left main coronary artery, terminates in the AV groove and giving  rise to the first obtuse marginal. The circumflex revealed no significant disease or stenosis greater than 30%.  Right Coronary Artery Distribution: The right coronary artery is a normal caliber vessel. The RCA arises normally from the right sinus of Valsalva, supplies the right posterolateral descending artery and supplies the posterolateral system. The RCA showed no significant disease or stenosis greater than 30%.  Valve Findings: 4+ aortic valve insufficiency was seen.  Hemo Personnel: +----------------+---------+ Name            Duty      +----------------+---------+ Mohinder Espinoza MD 1 +----------------+---------+  Hemodynamic Pressures:  +----+--------------------+----------+-------------+--------------+---------+ Site     Date Time      Phase NameSystolic mmHgDiastolic mmHgMean mmHg +----+--------------------+----------+-------------+--------------+---------+   AO7/14/2025 6:20:09 PM  [facility]          113            19       61 +----+--------------------+----------+-------------+--------------+---------+   AO7/14/2025 6:24:11 PM  [facility]           89            20       49 +----+--------------------+----------+-------------+--------------+---------+   AO7/14/2025 6:38:08 PM  [facility]           56            14       31 +----+--------------------+----------+-------------+--------------+---------+   AO7/14/2025 6:43:28 PM  [facility]           74            16       38 +----+--------------------+----------+-------------+--------------+---------+  Complications: No in-lab complications observed.  Cardiac Cath Post Procedure Notes: Post Procedure Diagnosis: Angiographically normal coronary arteries. Blood Loss:               Estimated blood loss during the procedure was 5ml mls. Specimens Removed:        Number of specimen(s) removed: none. ____________________________________________________________________________________ CONCLUSIONS:  1. Angiographically normal  right dominant system.  2. Severe prosthetic AR (prior TAVR).  3. Cardiogenic shock requiring intubation as the patient was unable to lay flat for the catheterization.  4. Initiation of IV epinepherine to support blood pressure.  5. SHOCK Call placed. After a discussion with the Shock team it was decided that no interventional nor surgical procedure would result in a meaningful recovery. This discussion was explained to her  and it was decided to transfer the patient to the ICU for de-escalation of care. ICD 10 Codes: ST elevation (STEMI) myocardial infarction involving right coronary artery-I21.11; Cardiogenic shock-R57.0  CPT Codes: Left Heart Cath (visualization of coronaries) and LV-55853  [id number] Mohinder Espinoza MD Performing Physician Electronically signed by [id number] Mohinder Espinoza MD on 7/15/2025 at 7:28:11 AM  ** Final **          Physical Exam:  GENERAL:  pleasant 85 year-old  HEENT: No xanthelasma  NECK: Supple, no palpable adenopathy or thyromegaly  CHEST: Clear to auscultation, respiratory effort unlabored  CARDIAC: RRR, normal S1 and S2, there is a grade 3 diastolic murmur heard best at the RSB, audible over the entire precordium.  Also audible is a grade 2/6 systolic murmur heard best at the RSB.  There is no audible rub, gallop, waterhammer pulses are present; Duroziez's sign is present., PMI is not displaced  ABD: Active bowel sounds, nontender, no organomegaly, no evidence of ascites  EXT: No clubbing, cyanosis, edema, or tenderness; there are no peripheral stigmata of endocarditis  NEURO: Awake, alert, appropriate, speech is fluent       Assessment/Plan   1.  Acute inferior STEMI with no significant coronary stenosis on cardiac catheterization.  It is possible she might of had a coronary artery embolism from her TAVR prosthesis.  2.  Severe acute aortic regurgitation by transthoracic echo and physical exam  3.  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  Essential  hypertension  5.  Hyperlipidemia  6.  Periprocedural cardiogenic shock following coronary angiography this hospitalization likely due to acute, severe aortic regurgitation.  7.  Acute diastolic heart failure secondary to problem #2; high oxygen requirement on 10 L Oxymizer    Recommendations:  1.  Blood cultures  2.  Transesophageal echocardiography.  Indications, risks, benefits, alternatives were discussed in detail with the patient and her .  They understand and wish to proceed.  She is ASA class IV for sedation.  We will need anesthesia support during this procedure to manage the airway and sedation in this unstable patient.  3.  With additional data provided by the [facility], we can hopefully transfer her to [facility] for consideration for redo TAVR.    I spent 50 minutes providing care.    Code Status:  DNR and No Intubation      Kolby Cedeño MD         [1]   Family History  Problem Relation Name Age of Onset    Coronary artery disease Mother      No Known Problems Father     [2]   Scheduled medications   Medication Dose Route Frequency    aspirin  81 mg oral Daily    atorvastatin  20 mg oral Nightly    azithromycin  500 mg intravenous q24h    busPIRone  5 mg oral BID    cefTRIAXone  1 g intravenous q24h    cholecalciferol  25 mcg oral Daily    docusate sodium  100 mg oral BID    ezetimibe  10 mg oral Nightly    latanoprost  1 drop Both Eyes Nightly    mirtazapine  7.5 mg oral Nightly    oxygen   inhalation Continuous - Inhalation    polyethylene glycol  17 g oral Daily   [3]   PRN medications   Medication    diazePAM    glycopyrrolate    LORazepam    morphine    morphine    morphine   [4]   Continuous Medications   Medication Dose Last Rate

## 2025-07-15 NOTE — NURSING NOTE
Cardiac Rehab: Patient seen for qualifying diagnosis to cardiac rehab program. Described and discussed Phase II Cardiac Rehab program with patient, gave [facility] Handbook and pamphlet, and discussed enrollment into Phase II. Reviewed cardiac lifestyle modifications necessary for improved heart health. Encouraged cardiology follow up post hospitalization. Discussed the importance of medication compliance. Cardiac rehab staff will contact patient following discharge for enrollment in program.

## 2025-07-15 NOTE — SIGNIFICANT EVENT
Per my previous significant event note yesterday evening: RT was called to bedside and patient was extubated, placed on comfort measures per the wishes of her .  Given her significant ventilatory support, we expected that the patient would likely decompensate rapidly.  However, she remained largely hemodynamically stable with occasional decreases in her MAP but saturating appropriately on Oxymizer.  Surprisingly, her mentation continued to improve and she was able to ambulate to the bathroom with assistance.  On shift change of the nursing supervisor, it was discovered that the patient still had her right femoral arterial and venous sheaths in place from her cardiac catheterization.  The form that was sent up from the Cath Lab had a handwritten note stating that they had been removed at 7:02 PM.  I discussed the situation with Dr. Espinoza.  Reportedly, they were left in place as the patient was determined to be critically ill.  He recommended that they be removed and patient be on strict bedrest for 2 hours.  This was discussed at length with nursing staff who removed the lines.  Her  was also aware that she is not to get out of bed for 2 hours.  He also recommended given that the patient had had such a significant clinical improvement, stable off of pressors with improved oxygen requirement, that we rediscussed an echocardiogram with the patient's and her .  They were amenable to obtaining an echocardiogram today.  We also send repeated labs.  CODE STATUS was changed from DNR CCA to DNR CCA/DNI at the request of the .  The plan is to utilize the results of the echocardiogram to dictate further management versus potential discussion with hospice as indicated.

## 2025-07-15 NOTE — SIGNIFICANT EVENT
Venous sheath removed at 0445 per orders by Jose BENITES, Arterial sheath removed at 0500. 15 mins of manual pressure applied when venous sheath was removed and 20 mins of manual pressure applied after arterial removal. Patient tolerated sheath removals very well, right groin site soft, nontender, and has no drainage.

## 2025-07-15 NOTE — SIGNIFICANT EVENT
Patient extubated to oxymizer 15 L.  Fentanyl turned off at 2210.  4 mg morphine, 0.5 mg Ativan and 200 mcg Robinul given.  Comfort measures only.

## 2025-07-15 NOTE — CARE PLAN
Problem: Pain - Adult  Goal: Verbalizes/displays adequate comfort level or baseline comfort level  Outcome: Progressing  Flowsheets (Taken 7/15/2025 0018)  Verbalizes/displays adequate comfort level or baseline comfort level:   Encourage patient to monitor pain and request assistance   Assess pain using appropriate pain scale   Administer analgesics based on type and severity of pain and evaluate response     Problem: Safety - Adult  Goal: Free from fall injury  Outcome: Progressing     Problem: Chronic Conditions and Co-morbidities  Goal: Patient's chronic conditions and co-morbidity symptoms are monitored and maintained or improved  Outcome: Progressing  Flowsheets (Taken 7/15/2025 0018)  Care Plan - Patient's Chronic Conditions and Co-Morbidity Symptoms are Monitored and Maintained or Improved: Monitor and assess patient's chronic conditions and comorbid symptoms for stability, deterioration, or improvement     Problem: Skin  Goal: Prevent/minimize sheer/friction injuries  Outcome: Progressing  Flowsheets (Taken 7/15/2025 0018)  Prevent/minimize sheer/friction injuries: Turn/reposition every 2 hours/use positioning/transfer devices  Goal: Promote skin healing  Outcome: Progressing  Flowsheets (Taken 7/15/2025 0018)  Promote skin healing:   Assess skin/pad under line(s)/device(s)   Turn/reposition every 2 hours/use positioning/transfer devices

## 2025-07-15 NOTE — NURSING NOTE
Called to bedside to evaluate patient r going site s/p C. R groin noted to have arterial sheath and venous sheath intact inplace. Dr Espinoza notified of sheaths remaining in place without orders to remove sheaths. Per cardiologist ok to discontinue venous and arterial sheaths with bedrest 2hrs post sheath removal.   Dr Pena updated Dr Espinoza of patient condition.

## 2025-07-15 NOTE — PROGRESS NOTES
Critical Care Daily Progress Note        Subjective   Patient is a 85 y.o. female admitted on 7/14/2025  5:17 PM with the following indication(s) for ICU care: Inferior STEMI, acute hypoxic respiratory failure requiring intubation.     Brief history/Interval History:   This is an 85 y.o. 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 Concerns for Inferior STEMI on 12 lead EKG, hence STEMI alert called, interventional cardiology notified and pt subsequently taken to the cardiac catheterization lab.     Cardiac cath results: Normal right dominant system, severe prosthetic AR. Pt developed acute hypoxic respiratory failure requiring intubation after RSI given. PT became hypotensive and a epinephrine infusion initiated to support blood pressure. SHOCK team notified at [facility], team decided no interventional or surgical procedure would result in a meaningful recovery. Pt subsequently admitted to the intensive care unit for de escalation of care, and compassionate withdrawal after discussing with spouse.    Overnight 2145 pt was compassionately extubated to an oxymizer, and initiation of comfort care measures implemented. Pt hemodynamics, oxygenation stable, mentation greatly improved, off of IV pressors. Pts code status was changed fromDNRCC to DNRCCA/DNI due to improvement. Repeat 2D Echo ordered for this am.     Pt seen and examined, sitting up in bed in NAD, awake and alert, oriented X3. Endorses no complaints at this time. Oxygenating well on 14L Oxymizer, remains in NSR and hemodynamically stable.     Complaints: has none..     Scheduled Medications:   Scheduled Medications[1]     Continuous Medications:   Continuous Medications[2]     PRN Medications:   PRN Medications[3]    Objective   Vitals:  Most Recent:  Vitals:    07/15/25 0800   BP: (!) 142/44   Pulse: 88   Resp: 21   Temp:    SpO2: 96%        24hr Min/Max:  Temp  Min: 35.4 °C (95.7 °F)  Max: 36.5 °C (97.7 °F)  Pulse  Min: 76  Max: 150  BP  Min: 97/44  Max: 172/63  Resp  Min: 11  Max: 51  SpO2  Min: 55 %  Max: 100 %      Vent settings:  Vent Mode: Volume control/assist control  S RR:  [20] 20  S VT:  [400 mL] 400 mL  PEEP/CPAP (cm H2O):  [5 cm H20-8 cm H20] 8 cm H20  MAP (cm H2O):  [9.6-9.9] 9.9    Hemodynamic parameters for last 24 hours:       I/O:    Intake/Output Summary (Last 24 hours) at 7/15/2025 0813  Last data filed at 7/14/2025 2200  Gross per 24 hour   Intake 38.58 ml   Output 5 ml   Net 33.58 ml       Physical Exam  Constitutional:       Appearance: Normal appearance.   HENT:      Head: Normocephalic.      Nose: Nose normal.      Mouth/Throat:      Mouth: Mucous membranes are moist.   Eyes:      Extraocular Movements: Extraocular movements intact.      Pupils: Pupils are equal, round, and reactive to light.   Cardiovascular:      Rate and Rhythm: Normal rate and regular rhythm.      Pulses: Normal pulses.      Heart sounds: Murmur heard.   Pulmonary:      Effort: Pulmonary effort is normal.      Breath sounds: Normal breath sounds.   Abdominal:      General: Bowel sounds are normal.      Palpations: Abdomen is soft.   Musculoskeletal:      Cervical back: Normal range of motion and neck supple.   Skin:     General: Skin is warm.      Capillary Refill: Capillary refill takes less than 2 seconds.   Neurological:      General: No focal deficit present.      Mental Status: She is alert. Mental status is at baseline.   Psychiatric:         Mood and Affect: Mood normal.         Behavior: Behavior normal.     Lab/Radiology/Diagnostic Review:  Results for orders placed or performed during the hospital encounter of 07/14/25 (from the past 24 hours)   CBC and Auto Differential   Result Value Ref Range    WBC 6.9 4.4 - 11.3 x10*3/uL    nRBC 0.0 0.0 - 0.0 /100 WBCs    RBC 4.95 4.00 - 5.20 x10*6/uL    Hemoglobin 15.8 12.0 - 16.0 g/dL    Hematocrit  47.8 (H) 36.0 - 46.0 %    MCV 97 80 - 100 fL    MCH 31.9 26.0 - 34.0 pg    MCHC 33.1 32.0 - 36.0 g/dL    RDW 12.2 11.5 - 14.5 %    Platelets 228 150 - 450 x10*3/uL    Neutrophils % 64.2 40.0 - 80.0 %    Immature Granulocytes %, Automated 0.3 0.0 - 0.9 %    Lymphocytes % 26.8 13.0 - 44.0 %    Monocytes % 5.8 2.0 - 10.0 %    Eosinophils % 2.3 0.0 - 6.0 %    Basophils % 0.6 0.0 - 2.0 %    Neutrophils Absolute 4.45 1.60 - 5.50 x10*3/uL    Immature Granulocytes Absolute, Automated 0.02 0.00 - 0.50 x10*3/uL    Lymphocytes Absolute 1.86 0.80 - 3.00 x10*3/uL    Monocytes Absolute 0.40 0.05 - 0.80 x10*3/uL    Eosinophils Absolute 0.16 0.00 - 0.40 x10*3/uL    Basophils Absolute 0.04 0.00 - 0.10 x10*3/uL   Comprehensive Metabolic Panel   Result Value Ref Range    Glucose 129 (H) 74 - 99 mg/dL    Sodium 138 136 - 145 mmol/L    Potassium 4.2 3.5 - 5.3 mmol/L    Chloride 101 98 - 107 mmol/L    Bicarbonate 27 21 - 32 mmol/L    Anion Gap 14 10 - 20 mmol/L    Urea Nitrogen 34 (H) 6 - 23 mg/dL    Creatinine 1.21 (H) 0.50 - 1.05 mg/dL    eGFR 44 (L) >60 mL/min/1.73m*2    Calcium 9.8 8.6 - 10.3 mg/dL    Albumin 4.4 3.4 - 5.0 g/dL    Alkaline Phosphatase 50 33 - 136 U/L    Total Protein 7.5 6.4 - 8.2 g/dL    AST 25 9 - 39 U/L    Bilirubin, Total 0.5 0.0 - 1.2 mg/dL    ALT 15 7 - 45 U/L   Magnesium   Result Value Ref Range    Magnesium 2.39 1.60 - 2.40 mg/dL   Troponin I, High Sensitivity, Initial   Result Value Ref Range    Troponin I, High Sensitivity 29 (H) 0 - 13 ng/L   Blood Gas Venous Full Panel Unsolicited   Result Value Ref Range    POCT pH, Venous 7.40 7.33 - 7.43 pH    POCT pCO2, Venous 45 41 - 51 mm Hg    POCT pO2, Venous 25 (L) 35 - 45 mm Hg    POCT SO2, Venous 36 (L) 45 - 75 %    POCT Oxy Hemoglobin, Venous 35.4 (L) 45.0 - 75.0 %    POCT Hematocrit Calculated, Venous 48.0 (H) 36.0 - 46.0 %    POCT Sodium, Venous 133 (L) 136 - 145 mmol/L    POCT Potassium, Venous 5.2 3.5 - 5.3 mmol/L    POCT Chloride, Venous 103 98 - 107  mmol/L    POCT Ionized Calicum, Venous 1.20 1.10 - 1.33 mmol/L    POCT Glucose, Venous 141 (H) 74 - 99 mg/dL    POCT Lactate, Venous 1.8 0.4 - 2.0 mmol/L    POCT Base Excess, Venous 2.4 -2.0 - 3.0 mmol/L    POCT HCO3 Calculated, Venous 27.9 (H) 22.0 - 26.0 mmol/L    POCT Hemoglobin, Venous 16.0 12.0 - 16.0 g/dL    POCT Anion Gap, Venous 7.0 (L) 10.0 - 25.0 mmol/L    Patient Temperature     Troponin, High Sensitivity, 1 Hour   Result Value Ref Range    Troponin I, High Sensitivity 1,354 (HH) 0 - 13 ng/L   CBC and Auto Differential   Result Value Ref Range    WBC 17.9 (H) 4.4 - 11.3 x10*3/uL    nRBC 0.0 0.0 - 0.0 /100 WBCs    RBC 5.30 (H) 4.00 - 5.20 x10*6/uL    Hemoglobin 16.9 (H) 12.0 - 16.0 g/dL    Hematocrit 53.0 (H) 36.0 - 46.0 %     80 - 100 fL    MCH 31.9 26.0 - 34.0 pg    MCHC 31.9 (L) 32.0 - 36.0 g/dL    RDW 12.1 11.5 - 14.5 %    Platelets 183 150 - 450 x10*3/uL    Neutrophils % 80.4 40.0 - 80.0 %    Immature Granulocytes %, Automated 0.5 0.0 - 0.9 %    Lymphocytes % 7.8 13.0 - 44.0 %    Monocytes % 7.6 2.0 - 10.0 %    Eosinophils % 3.4 0.0 - 6.0 %    Basophils % 0.3 0.0 - 2.0 %    Neutrophils Absolute 14.43 (H) 1.60 - 5.50 x10*3/uL    Immature Granulocytes Absolute, Automated 0.09 0.00 - 0.50 x10*3/uL    Lymphocytes Absolute 1.39 0.80 - 3.00 x10*3/uL    Monocytes Absolute 1.36 (H) 0.05 - 0.80 x10*3/uL    Eosinophils Absolute 0.61 (H) 0.00 - 0.40 x10*3/uL    Basophils Absolute 0.05 0.00 - 0.10 x10*3/uL   Comprehensive metabolic panel   Result Value Ref Range    Glucose 122 (H) 74 - 99 mg/dL    Sodium 138 136 - 145 mmol/L    Potassium 4.3 3.5 - 5.3 mmol/L    Chloride 103 98 - 107 mmol/L    Bicarbonate 23 21 - 32 mmol/L    Anion Gap 16 10 - 20 mmol/L    Urea Nitrogen 39 (H) 6 - 23 mg/dL    Creatinine 1.35 (H) 0.50 - 1.05 mg/dL    eGFR 38 (L) >60 mL/min/1.73m*2    Calcium 9.2 8.6 - 10.3 mg/dL    Albumin 3.7 3.4 - 5.0 g/dL    Alkaline Phosphatase 48 33 - 136 U/L    Total Protein 6.5 6.4 - 8.2 g/dL    AST  59 (H) 9 - 39 U/L    Bilirubin, Total 0.7 0.0 - 1.2 mg/dL    ALT 16 7 - 45 U/L   Magnesium   Result Value Ref Range    Magnesium 2.48 (H) 1.60 - 2.40 mg/dL   Phosphorus   Result Value Ref Range    Phosphorus 5.1 (H) 2.5 - 4.9 mg/dL   Troponin I, High Sensitivity   Result Value Ref Range    Troponin I, High Sensitivity 4,624 (HH) 0 - 13 ng/L   B-type natriuretic peptide   Result Value Ref Range     (H) 0 - 99 pg/mL   Morphology   Result Value Ref Range    RBC Morphology See Below     Giant Platelets Few    POCT GLUCOSE   Result Value Ref Range    POCT Glucose 105 (H) 74 - 99 mg/dL     Imaging  XR chest 1 view  Result Date: 7/14/2025  1.  Increasing bilateral reticular opacities which are suggestive of interstitial edema or pneumonitis.     Signed by: Ghassan Benítez 7/14/2025 5:58 PM Dictation workstation:   [id number]      Cardiology, Vascular, and Other Imaging  Cardiac Catheterization Procedure  Result Date: 7/15/2025   [facility] [place], Cath Lab      [address] [address] Cardiovascular Catheterization Report Patient Name:     KAYLEE HATFIELD    Performing Physician:  Marisela Aceves MD Study Date:       7/14/2025           Verifying Physician:   Marisela Aceves MD MRN/PID:          [id number]            Cardiologist/Co-Scrub: Accession#:       [id number]        Ordering Provider:     Marisela ACEVES Date of           1940 / 85      Cardiologist: Birth/Age:        years Gender:           F                   Fellow: Encounter#:       [id number]          Surgeon:  Study: Left Heart Cath  Indications: KAYLEE HATFIELD is a 85 year old female who presents with aortic stenosis and a chest pain assessment of typical angina. Acute coronary syndrome - STEMI. Stress test performed:  No. CTA performed: No. [facility] accessed: No. LVEF Assessed: No. Cardiac arrest: No responsive following cardiac arrest. Cardiac surgical consult: No cardiac surgery not recommended. Cardiovascular Instability: Yes. Cardiogenic shock. Frailty status of patient entering lab: 9 = Terminally ill.  Procedure Description: After infiltration with 2% Lidocaine, the right femoral artery was cannulated with a modified Seldinger technique. Subsequently a 6 Frisian sheath was placed in the right femoral artery. After infiltration of local anesthetic, the right femoral vein was identified with two-dimensional ultrasound. Under direct ultrasound visualization, the right femoral vein was cannulated with a micropuncture technique. A 8 Frisian sheath was placed in the vein. Selective coronary catheterization was performed using a 5 Fr and 6 Fr catheter(s) exchanged over a guide wire to cannulate the coronary arteries. A JL 4 tip catheter was used for left coronary injections. A JR 4 tip catheter was used for right coronary injections. Multiple injections of contrast were made into the left and right coronary arteries with angiograms recorded in multiple projections. After completion of the procedure, the arterial sheath was intact, to be removed in the holding area. Post-procedure, the venous sheath was intact, to be removed in the holding area.  Coronary Angiography: The coronary circulation is right dominant.  Left Main Coronary Artery: The left main coronary artery is a normal caliber vessel. The left main arises normally from the left coronary sinus of Valsalva, bifurcates into the LAD and circumflex coronary arteries and gives rise to the ramus intermedius artery. The left main coronary artery showed no significant disease or stenosis greater than 30%.  Left Anterior Descending Coronary Artery Distribution: The left anterior descending coronary artery is a normal caliber vessel. The LAD arises normally from the left main  coronary artery, wraps around the apex of the LV, gives rise to the 1st diagonal branch and 2nd diagonal branch. The LAD demonstrated no significant disease or stenosis greater than 30%.  Circumflex Coronary Artery Distribution: The circumflex coronary artery is a normal caliber vessel. The circumflex arises normally from the left main coronary artery, terminates in the AV groove and giving rise to the first obtuse marginal. The circumflex revealed no significant disease or stenosis greater than 30%.  Right Coronary Artery Distribution: The right coronary artery is a normal caliber vessel. The RCA arises normally from the right sinus of Valsalva, supplies the right posterolateral descending artery and supplies the posterolateral system. The RCA showed no significant disease or stenosis greater than 30%.  Valve Findings: 4+ aortic valve insufficiency was seen.  Hemo Personnel: +----------------+---------+ Name            Duty      +----------------+---------+ Mohinder Espinoza MD 1 +----------------+---------+  Hemodynamic Pressures:  +----+--------------------+----------+-------------+--------------+---------+ Site     Date Time      Phase NameSystolic mmHgDiastolic mmHgMean mmHg +----+--------------------+----------+-------------+--------------+---------+   AO7/14/2025 6:20:09 PM  AIR REST          113            19       61 +----+--------------------+----------+-------------+--------------+---------+   AO7/14/2025 6:24:11 PM  AIR REST           89            20       49 +----+--------------------+----------+-------------+--------------+---------+   AO7/14/2025 6:38:08 PM  AIR REST           56            14       31 +----+--------------------+----------+-------------+--------------+---------+   AO7/14/2025 6:43:28 PM  AIR REST           74            16       38 +----+--------------------+----------+-------------+--------------+---------+  Complications: No in-lab  complications observed.  Cardiac Cath Post Procedure Notes: Post Procedure Diagnosis: Angiographically normal coronary arteries. Blood Loss:               Estimated blood loss during the procedure was 5ml mls. Specimens Removed:        Number of specimen(s) removed: none. ____________________________________________________________________________________ CONCLUSIONS:  1. Angiographically normal right dominant system.  2. Severe prosthetic AR (prior TAVR).  3. Cardiogenic shock requiring intubation as the patient was unable to lay flat for the catheterization.  4. Initiation of IV epinepherine to support blood pressure.  5. SHOCK Call placed. After a discussion with the Shock team it was decided that no interventional nor surgical procedure would result in a meaningful recovery. This discussion was explained to her  and it was decided to transfer the patient to the ICU for de-escalation of care. ICD 10 Codes: ST elevation (STEMI) myocardial infarction involving right coronary artery-I21.11; Cardiogenic shock-R57.0  CPT Codes: Left Heart Cath (visualization of coronaries) and LV-66770  [id number] Mohinder Espinoza MD Performing Physician Electronically signed by [id number] Mohinder Espinoza MD on 7/15/2025 at 7:28:11 AM  ** Final **         Assessment & Plan  Flash pulmonary edema      Neuro:   #Metabolic encephalopathy-greatly improved  -Continue neurochecks per protocol  -Monitor CAM-ICU  -Tylenol as needed for pain control  -Will resume home Buspar and Remeron    Cardiovascular:  #Inferior STEMI  #Cardiogenic shock  #Severe aortic regurgitation  #S/P TAVR 2019  -Continuous cardiac monitoring  -Monitor hemodynamics, currently hemodynamically stable off of IV pressors  -Maintain Goal MAP > 65  -Repeat 2D echo completed this morning  -Cardiology following, appreciate recommendations  -Monitor and optimize electrolytes, keep K > 4.0, Mag > 2.0  -Continue to monitor and trend troponin 29->6251  -Bnpep 802  -Repeat EKG this  morning  -Hold antihypertensives for now  -Resume home Zetia, ASA and statin    Pulmonary:   #Acute hypoxic respiratory failure requiring intubation  #Pulmonary edema  -Was initially intubated, compassionate withdrawal overnight  Vent Mode: Volume control/assist control  S RR:  [20] 20  S VT:  [400 mL] 400 mL  PEEP/CPAP (cm H2O):  [5 cm H20-8 cm H20] 8 cm H20  MAP (cm H2O):  [9.6-9.9] 9.9   -Currently on 14L via Oxymizer  -Continuous pulse oximetry   -Wean O2 to maintain SPO2 > 90%  -Aggressive chest physiotherapy/aggressive pulmonary toileting/bronchial hygiene  -Encourage incentive spirometer  -Repeat CXR this am  -OOB to chair as tolerated    Gastrointestinal:   -Advance diet as tolerated  -Consult nutritional therapy  -No indication for GI prophylaxis at this time  -Bowel regimen: Colace and Miralax   -Last BM: Unknown    Renal:   #STEPHANE  -No indication for indwelling osborne catheter  -Maintain external urinary device  -Baseline sr creatinine 0.80  -Monitor and trend sr Creatinine  -Electrolytes: Replete per protocol, goal K>4 Phos >3 Mg >2  Net IO Since Admission: 33.58 mL [07/15/25 0813]  Results from last 72 hours   Lab Units 07/15/25  0439 07/14/25  1731   BUN mg/dL 39* 34*   CREATININE mg/dL 1.35* 1.21*         Endocrine:   -Monitor blood glucose, blood sugars stable, no indication for Novolog SSC for glycemic control a this time  -Goal BS < 180  -Follow hypoglycemic protocol  -TSH 4.07 6/18/2025  Results from last 7 days   Lab Units 07/15/25  0757 07/15/25  0439 07/14/25  1731   POCT GLUCOSE mg/dL 105*  --   --    GLUCOSE mg/dL  --  122* 129*      Hematology:   -Monitor HH  -Daily CBC, coags  -Start heparin for VTE prophylaxis  -Maintain bilateral SCDs  Results from last 7 days   Lab Units 07/15/25  0439 07/14/25  1731   HEMOGLOBIN g/dL 16.9* 15.8   HEMATOCRIT % 53.0* 47.8*   PLATELETS AUTO x10*3/uL 183 228       Infectious Disease:   #Leukocytosis  #Possible Pneumonia  -Send procalcitonin level  -Send blood  cultures X2  -Send viral panel  -Send urinary antigens for SPNEU and Legionella antigens  -Start ceftriaxone and azithromycin  Results from last 7 days   Lab Units 07/15/25  0439 25  1731   WBC AUTO x10*3/uL 17.9* 6.9     Temp (24hrs), Av.8 °C (96.4 °F), Min:35.4 °C (95.7 °F), Max:36.5 °C (97.7 °F)     Musculoskeletal/Integumentary:   -Skin and wound care per protocol  -PT/OT to evaluate and treat    Lines/Tubes/Drains:   PIV  External urinary device    CODE STATUS: DNR and No Intubation    Disposition:   -Remain in the intensive care unit under the care of the critical care team    ABCDEF Checklist  Analgesia: Spontaneous awakening trial to be pursued if clinically appropriate. RASS goal reviewed  Breathing: Spontaneous breathing trial to be pursued if clinically appropriate. Mechanical power of assisted ventilation reviewed  Choice of analgesia/sedation: Analgesic and sedative agents adjusted per clinical context.   Delirium assessed by CAM, will avoid exacerbating factors  Early mobility and exercise: Physical and occupational therapy engaged  Family: Plan of care, overall trajectory of patient shared with family. Questions elicited and answered as appropriate.       Due to the high probability of life threatening clinical decompensation, the patient required critical care time evaluating and managing this patient.  Critical care time included obtaining a history, examining the patient, ordering and reviewing studies, discussing, developing, and implementing a management plan, evaluating the patient's response to treatment, and discussion with other care team providers. I saw and evaluated the patient myself.  Critical care time was performed exclusive of billable procedures.      ELAINA Velasquez-CNP  Critical Care Medicine      I spent 60 minutes in the professional and overall care of this patient.       [id number]: Repeat echo completed, EF normal at 65% severe prosthetic aortic regurgitation.  Case was discussed with interventional cardiology at [facility], who recommended patient to have ARASELI to evaluate Aortic Valve. Cardiology placed on consult and ARASELI ordered. Plans are to do ARASELI  with anesthesia. Had lengthy discussion with patient and spouse who are agreeable to proceed. ARASELI initially scheduled for this afternoon but will now be performed tomorrow 7/16 at 1400 at bedside with anesthesia.          [1] oxygen, , inhalation, Continuous - Inhalation  [2]    [3] PRN medications: diazePAM, glycopyrrolate, LORazepam, morphine, morphine, morphine

## 2025-07-15 NOTE — SIGNIFICANT EVENT
I was called to bedside by nursing staff at the request of the patient's  and medical power of .  He is requesting terminal extubation and comfort measures at this time.  Patient with normal blood pressure and heart rate.  Notable rhonchi in the bilateral lower lobes.  She is requiring 80% FiO2.  Situation was discussed at length with the intensivist on-call Dr. Sandhu.  We agreed to this plan of action as the shock team cannot perform any additional interventions that we will save her life at this time.  Extraneous orders were canceled and patient was placed on comfort measures with morphine, Ativan, glycopyrrolate.  RT to come to bedside for terminal extubation.

## 2025-07-16 NOTE — PROGRESS NOTES
Critical Care Daily Progress Note        Subjective   Patient is a 85 y.o. female admitted on 7/14/2025  5:17 PM with the following indication(s) for ICU care: Inferior STEMI, acute hypoxic respiratory failure requiring intubation.     Brief history/Interval History:   This is an 85 y.o. 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 Concerns for Inferior STEMI on 12 lead EKG, hence STEMI alert called, interventional cardiology notified and pt subsequently taken to the cardiac catheterization lab.     Cardiac cath results: Normal right dominant system, severe prosthetic AR. Pt developed acute hypoxic respiratory failure requiring intubation after RSI given. PT became hypotensive and a epinephrine infusion initiated to support blood pressure. SHOCK team notified at [facility], team decided no interventional or surgical procedure would result in a meaningful recovery. Pt subsequently admitted to the intensive care unit for de escalation of care, and compassionate withdrawal after discussing with spouse.    Pt was subsequently compassionately extubated to an oxymizer, and initiation of comfort care measures implemented. Pt hemodynamics, oxygenation stable, mentation greatly improved, off of IV pressors. Pts code status was changed fromDNRCC to DNRCCA/DNI due to improvement.     Pt seen and examined this morning, sitting up in bed in NAD, awake and alert, oriented X3. Endorses no complaints at this time. Oxygenating well on 14L Oxymizer, remains in NSR.  Episode of tachycardia 150s to 60s and hypertension SBP 220s after lying back in bed from sitting at bedside.  Patient denies chest pain, shortness of breath or ill feeling.  Attempted Valsalva maneuver with no response.  Improved and eventually resolved with metoprolol 5 mg IV x 1    Complaints: has none..     Scheduled Medications:   Scheduled  Medications[1]     Continuous Medications:   Continuous Medications[2]     PRN Medications:   PRN Medications[3]    Objective   Vitals:  Most Recent:  Vitals:    07/16/25 0500   BP: 168/66   Pulse: 98   Resp: 20   Temp:    SpO2: 90%       24hr Min/Max:  Temp  Min: 36.6 °C (97.9 °F)  Max: 36.8 °C (98.2 °F)  Pulse  Min: 84  Max: 112  BP  Min: 83/47  Max: 183/54  Resp  Min: 15  Max: 36  SpO2  Min: 90 %  Max: 97 %      Vent settings:       Hemodynamic parameters for last 24 hours:       I/O:    Intake/Output Summary (Last 24 hours) at 7/16/2025 0819  Last data filed at 7/15/2025 1707  Gross per 24 hour   Intake 280 ml   Output 10 ml   Net 270 ml       Physical Exam  Constitutional:       Appearance: Normal appearance.   HENT:      Head: Normocephalic.      Nose: Nose normal.      Mouth/Throat:      Mouth: Mucous membranes are moist.     Eyes:      Extraocular Movements: Extraocular movements intact.      Pupils: Pupils are equal, round, and reactive to light.       Cardiovascular:      Rate and Rhythm: Normal rate and regular rhythm.      Pulses: Normal pulses.      Heart sounds: Murmur heard.   Pulmonary:      Effort: Pulmonary effort is normal. No respiratory distress.      Breath sounds: Normal breath sounds.   Abdominal:      General: Bowel sounds are normal.      Palpations: Abdomen is soft.     Musculoskeletal:      Cervical back: Normal range of motion and neck supple.     Skin:     General: Skin is warm.      Capillary Refill: Capillary refill takes less than 2 seconds.     Neurological:      General: No focal deficit present.      Mental Status: She is alert. Mental status is at baseline.     Psychiatric:         Mood and Affect: Mood normal.         Behavior: Behavior normal.       Lab/Radiology/Diagnostic Review:  Results for orders placed or performed during the hospital encounter of 07/14/25 (from the past 24 hours)   Troponin I, High Sensitivity   Result Value Ref Range    Troponin I, High Sensitivity  6,251 (HH) 0 - 13 ng/L   Procalcitonin   Result Value Ref Range    Procalcitonin 0.14 (H) <=0.07 ng/mL   Hepatic function panel   Result Value Ref Range    Albumin 3.6 3.4 - 5.0 g/dL    Bilirubin, Total 0.7 0.0 - 1.2 mg/dL    Bilirubin, Direct 0.2 0.0 - 0.3 mg/dL    Alkaline Phosphatase 46 33 - 136 U/L    ALT 15 7 - 45 U/L    AST 69 (H) 9 - 39 U/L    Total Protein 6.6 6.4 - 8.2 g/dL   Transthoracic Echo Complete   Result Value Ref Range    AV pk michelle 1.79 m/s    LV Biplane EF 65 %    LVOT diam 1.70 cm    MV E/A ratio 1.58     Tricuspid annular plane systolic excursion 2.3 cm    LA vol index A/L 31.4 ml/m2    AV mn grad 3 mmHg    LV EF 65 %    RV free wall pk S' 14.60 cm/s    RVSP 45 mmHg    LVIDd 4.60 cm    Aortic Valve Area by Continuity of Peak Velocity 1.22 cm2    AV pk grad 13 mmHg    Aortic Valve Area by Continuity of VTI 2.02 cm2    LV A4C EF 67.0    Lactate   Result Value Ref Range    Lactate 2.0 0.4 - 2.0 mmol/L   ECG 12 Lead   Result Value Ref Range    Ventricular Rate 90 BPM    Atrial Rate 90 BPM    NJ Interval 122 ms    QRS Duration 74 ms    QT Interval 386 ms    QTC Calculation(Bazett) 472 ms    P Axis 74 degrees    R Axis 61 degrees    T Axis 53 degrees    QRS Count 15 beats    Q Onset 223 ms    P Onset 162 ms    P Offset 212 ms    T Offset 416 ms    QTC Fredericia 441 ms   POCT GLUCOSE   Result Value Ref Range    POCT Glucose 112 (H) 74 - 99 mg/dL   Sars-CoV-2, Influenza A/B and RSV PCR   Result Value Ref Range    Coronavirus 2019, PCR Not Detected Not Detected    Flu A Result Not Detected Not Detected    Flu B Result Not Detected Not Detected    RSV PCR Not Detected Not Detected   Blood Culture    Specimen: Peripheral Venipuncture; Blood culture   Result Value Ref Range    Blood Culture Loaded on Instrument - Culture in progress    Troponin I, High Sensitivity   Result Value Ref Range    Troponin I, High Sensitivity 7,756 (HH) 0 - 13 ng/L   Blood Culture    Specimen: Peripheral Venipuncture; Blood  culture   Result Value Ref Range    Blood Culture Loaded on Instrument - Culture in progress    Magnesium   Result Value Ref Range    Magnesium 2.39 1.60 - 2.40 mg/dL   POCT GLUCOSE   Result Value Ref Range    POCT Glucose 115 (H) 74 - 99 mg/dL   CBC   Result Value Ref Range    WBC 17.9 (H) 4.4 - 11.3 x10*3/uL    nRBC 0.0 0.0 - 0.0 /100 WBCs    RBC 4.86 4.00 - 5.20 x10*6/uL    Hemoglobin 15.5 12.0 - 16.0 g/dL    Hematocrit 48.1 (H) 36.0 - 46.0 %    MCV 99 80 - 100 fL    MCH 31.9 26.0 - 34.0 pg    MCHC 32.2 32.0 - 36.0 g/dL    RDW 12.4 11.5 - 14.5 %    Platelets 189 150 - 450 x10*3/uL   Magnesium   Result Value Ref Range    Magnesium 2.46 (H) 1.60 - 2.40 mg/dL   Comprehensive metabolic panel   Result Value Ref Range    Glucose 129 (H) 74 - 99 mg/dL    Sodium 136 136 - 145 mmol/L    Potassium 3.8 3.5 - 5.3 mmol/L    Chloride 100 98 - 107 mmol/L    Bicarbonate 22 21 - 32 mmol/L    Anion Gap 18 10 - 20 mmol/L    Urea Nitrogen 53 (H) 6 - 23 mg/dL    Creatinine 1.41 (H) 0.50 - 1.05 mg/dL    eGFR 36 (L) >60 mL/min/1.73m*2    Calcium 9.0 8.6 - 10.3 mg/dL    Albumin 3.7 3.4 - 5.0 g/dL    Alkaline Phosphatase 46 33 - 136 U/L    Total Protein 6.4 6.4 - 8.2 g/dL    AST 54 (H) 9 - 39 U/L    Bilirubin, Total 0.7 0.0 - 1.2 mg/dL    ALT 16 7 - 45 U/L   Phosphorus   Result Value Ref Range    Phosphorus 4.8 2.5 - 4.9 mg/dL   Troponin I, High Sensitivity   Result Value Ref Range    Troponin I, High Sensitivity 4,378 (HH) 0 - 13 ng/L   POCT GLUCOSE   Result Value Ref Range    POCT Glucose 115 (H) 74 - 99 mg/dL   Urinalysis with Reflex Culture and Microscopic   Result Value Ref Range    Color, Urine Yellow Light-Yellow, Yellow, Dark-Yellow    Appearance, Urine Clear Clear    Specific Gravity, Urine 1.042 (N) 1.005 - 1.035    pH, Urine 5.5 5.0, 5.5, 6.0, 6.5, 7.0, 7.5, 8.0    Protein, Urine 70 (1+) (A) NEGATIVE, 10 (TRACE), 20 (TRACE) mg/dL    Glucose, Urine Normal Normal mg/dL    Blood, Urine 0.03 (TRACE) (A) NEGATIVE mg/dL    Ketones,  Urine NEGATIVE NEGATIVE mg/dL    Bilirubin, Urine NEGATIVE NEGATIVE mg/dL    Urobilinogen, Urine Normal Normal mg/dL    Nitrite, Urine NEGATIVE NEGATIVE    Leukocyte Esterase, Urine 250 Dwaine/uL (A) NEGATIVE   Microscopic Only, Urine   Result Value Ref Range    WBC, Urine 11-20 (A) 1-5, NONE /HPF    RBC, Urine 6-10 (A) NONE, 1-2, 3-5 /HPF    Squamous Epithelial Cells, Urine 1-9 (SPARSE) Reference range not established. /HPF    Bacteria, Urine 1+ (A) NONE SEEN /HPF    Mucus, Urine FEW Reference range not established. /LPF    Hyaline Casts, Urine 1+ (A) NONE /LPF     Imaging  XR chest 1 view  Result Date: 7/15/2025  Increasing bibasilar atelectasis or infiltrates. Pulmonary vascular congestion persists. Possible enlarging pleural effusions.   MACRO: None   Signed by: Americo Kendrick 7/15/2025 10:46 AM Dictation workstation:   [id number]    XR chest 1 view  Result Date: 7/14/2025  1.  Increasing bilateral reticular opacities which are suggestive of interstitial edema or pneumonitis.     Signed by: Ghassan Benítez 7/14/2025 5:58 PM Dictation workstation:   [id number]      Cardiology, Vascular, and Other Imaging  ECG 12 Lead  Result Date: 7/15/2025  Sinus rhythm with Premature supraventricular complexes Nonspecific ST abnormality Abnormal ECG When compared with ECG of 14-JUL-2025 17:19, (unconfirmed) Significant changes have occurred    Transthoracic Echo Complete  Result Date: 7/15/2025            [facility] [place] [address] [address] [place] [address]    Tel [phone] Fax [phone] TRANSTHORACIC ECHOCARDIOGRAM REPORT Patient Name:       KAYLEE HATFIELD     Reading Physician:    [id number] Mohinder Espinoza MD Study Date:         7/15/2025            Ordering Provider:    [id number] LINDA LAWLER MRN/PID:            [id number]             Fellow: Accession#:         [id number]         Nurse: Date  of Birth/Age:  1940 / 85 years Sonographer:          Maribeth Schneider Gender Assigned at  F                    Additional Staff: Birth: Height:             170.18 cm            Admit Date: Weight:             56.70 kg             Admission Status:     Inpatient -                                                                Priority                                                                discharge BSA / BMI:          1.66 m2 / 19.58      Department Location:  [facility] ICU Front                     kg/m2                                      (19-26) Blood Pressure: 124 /43 mmHg Study Type:    TRANSTHORACIC ECHO (TTE) COMPLETE Diagnosis/ICD: Acute respiratory failure with hypoxia-J96.01 Indication:    persistent hypoxia after cath, was thought to have severe aortic                insufficiency CPT Codes:     Echo Complete w Full [id number] Patient History: Pertinent History: CAD, Hyperlipidemia and HTN. s/p TAVR Evolut Pro 26mm - 2019. Study Detail: The following Echo studies were performed: 2D, M-Mode, Doppler and               color flow. Definity used as a contrast agent for endocardial               border definition. Total contrast used for this procedure was 3 mL               via IV push.  PHYSICIAN INTERPRETATION: Left Ventricle: Left ventricular ejection fraction is normal by visual estimate at 65%. There are no regional wall motion abnormalities. The left ventricular cavity size is normal. Spectral Doppler shows a normal pattern of left ventricular diastolic filling. Left Atrium: The left atrial size is mildly dilated. Right Ventricle: The right ventricle is normal in size. There is normal right ventricular global systolic function. Right Atrium: The right atrial size is normal. Aortic Valve: There is a prosthetic aortic valve present. The aortic valve area by VTI is 2.02 cmï¿½ with a peak velocity of 1.79 m/s. The peak and mean gradients are 13 mmHg and 3 mmHg, respectively, with a dimensionless  index of 0.89. There is a Medtronic transcatheter aortic valve prosthesis with a 26 mm reported size. 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 There is severe aortic valve regurgitation. Mitral Valve: The mitral valve is normal in structure. There is moderate mitral annular calcification. There is mild to moderate mitral valve regurgitation. The E Vmax is 1.17 m/s. The mitral regurgitant orifice area is 4 mm2. The mitral regurgitant volume is 6.24 ml. Tricuspid Valve: The tricuspid valve is structurally normal. There is mild to moderate tricuspid regurgitation. The Doppler estimated right ventricular systolic pressure (RVSP) is mildly elevated at 45 mmHg. Pulmonic Valve: The pulmonic valve is structurally normal. There is no indication of pulmonic valve regurgitation. Pericardium: No pericardial effusion noted. Aorta: The aortic root is normal.  CONCLUSIONS:  1. Left ventricular ejection fraction is normal by visual estimate at 65%.  2. There is moderate mitral annular calcification.  3. Mild to moderate mitral valve regurgitation.  4. Mild to moderate tricuspid regurgitation.  5. The Doppler estimated RVSP is mildly elevated at 45 mmHg.  6. There is a Medtronic transcatheter aortic valve prosthesis with a 26 mm reported size. The peak and mean gradients are 13 mmHg and 3 mmHg respectively.  7. Severe aortic valve regurgitation.  8. 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 QUANTITATIVE DATA SUMMARY:  2D MEASUREMENTS:             Normal Ranges: LAs:             3.50 cm     (2.7-4.0cm) IVSd:            0.80 cm     (0.6-1.1cm) LVPWd:           1.00 cm     (0.6-1.1cm) LVIDd:           4.60 cm     (3.9-5.9cm) LVIDs:           3.20 cm LV Mass Index:   83.2 g/m2 LVEDV Index:     40.41 ml/m2 LV % FS          30.4 %  LEFT ATRIUM:                  Normal Ranges: LA Vol A4C:        52.3 ml    (22+/-6mL/m2) LA Vol A2C:        47.9 ml LA Vol BP:         52.0 ml LA Vol Index A4C:  31.6ml/m2 LA Vol  Index A2C:  28.9 ml/m2 LA Vol Index BP:   31.4 ml/m2 LA Area A4C:       17.7 cm2 LA Area A2C:       17.6 cm2 LA Major Axis A4C: 5.1 cm LA Major Axis A2C: 5.5 cm LA Volume Index:   29.0 ml/m2 LA Vol A4C:        47.7 ml LA Vol A2C:        45.0 ml LA Vol Index BSA:  28.0 ml/m2  RIGHT ATRIUM:                 Normal Ranges: RA Vol A4C:        35.3 ml    (8.3-19.5ml) RA Vol Index A4C:  21.3 ml/m2 RA Area A4C:       14.2 cm2 RA Major Axis A4C: 4.9 cm  AORTA MEASUREMENTS:         Normal Ranges: Ao Sinus, d:        2.60 cm (2.1-3.5cm) Ao STJ, d:          2.30 cm (1.7-3.4cm) Asc Ao, d:          3.00 cm (2.1-3.4cm)  LV SYSTOLIC FUNCTION:                      Normal Ranges: EF-A4C View:    67 % (>=55%) EF-A2C View:    65 % EF-Biplane:     65 % EF-Visual:      65 % LV EF Reported: 65 %  LV DIASTOLIC FUNCTION:            Normal Ranges: MV Peak E:             1.17 m/s   (0.7-1.2 m/s) MV Peak A:             0.74 m/s   (0.42-0.7 m/s) E/A Ratio:             1.58       (1.0-2.2) MV e'                  0.053 m/s  (>8.0) MV lateral e'          0.05 m/s MV medial e'           0.06 m/s E/e' Ratio:            21.95      (<8.0) PulmV Sys Santi:         28.30 cm/s PulmV Monk Santi:        39.00 cm/s PulmV S/D Santi:         0.70 PulmV A Revs Santi:      23.60 cm/s PulmV A Revs Dur:      71.00 msec  MITRAL VALVE:          Normal Ranges: MV DT:        132 msec (150-240msec)  MITRAL INSUFFICIENCY:             Normal Ranges: PISA Radius:          0.3 cm MR VTI:               153.00 cm MR Vmax:              533.50 cm/s MR Alias Santi:         38.5 cm/s MR Volume:            6.24 ml MR Flow Rt:           21.77 ml/s MR EROA:              4 mm2  AORTIC VALVE:                      Normal Ranges: AoV Vmax:                1.79 m/s  (<=1.7m/s) AoV Peak P.8 mmHg (<20mmHg) AoV Mean PG:             3.0 mmHg  (1.7-11.5mmHg) LVOT Max Santi:            0.96 m/s  (<=1.1m/s) AoV VTI:                 23.10 cm  (18-25cm) LVOT VTI:                20.60 cm  LVOT Diameter:           1.70 cm   (1.8-2.4cm) AoV Area, VTI:           2.02 cm2  (2.5-5.5cm2) AoV Area,Vmax:           1.22 cm2  (2.5-4.5cm2) AoV Dimensionless Index: 0.89  AORTIC INSUFFICIENCY: AI Vmax:       2.28 m/s AI Half-time:  137 msec AI Decel Rate: 414.00 cm/s2  RIGHT VENTRICLE: RV Basal 3.50 cm RV Mid   3.40 cm RV Major 5.1 cm TAPSE:   22.6 mm RV s'    0.15 m/s  TRICUSPID VALVE/RVSP:          Normal Ranges: Peak TR Velocity:     3.23 m/s Est. RA Pressure:     3 mmHg RV Syst Pressure:     45 mmHg  (< 30mmHg) IVC Diam:             1.70 cm  PULMONIC VALVE:          Normal Ranges: PV Accel Time:  79 msec  (>120ms) PV Max Santi:     0.8 m/s  (0.6-0.9m/s) PV Max P.5 mmHg  PULMONARY VEINS: PulmV A Revs Dur: 71.00 msec PulmV A Revs Santi: 23.60 cm/s PulmV Monk Santi:   39.00 cm/s PulmV S/D Santi:    0.70 PulmV Sys Santi:    28.30 cm/s  Marisela Espinoza MD Electronically signed on 7/15/2025 at 1:21:22 PM  ** Final **     ECG 12 lead  Result Date: 7/15/2025  Normal sinus rhythm Left ventricular hypertrophy with repolarization abnormality ( R in aVL ) Cannot rule out Septal infarct (cited on or before 06-AUG-2019) Inferior infarct , possibly acute ** ** ACUTE MI / STEMI ** ** Consider right ventricular involvement in acute inferior infarct Abnormal ECG When compared with ECG of 2025 00:34, Significant changes have occurred    Cardiac Catheterization Procedure  Result Date: 7/15/2025   [facility], Cath Lab      [id number] [address] [id number] Cardiovascular Catheterization Report Patient Name:     KAYLEE HATFIELD    Performing Physician:  Marisela Espinoza MD Study Date:       2025           Verifying Physician:   Marisela Espinoza MD MRN/PID:          [id number]            Cardiologist/Co-Scrub: Accession#:       [id number]        Ordering Provider:      [id number] LAYLA ACEVES Date of           1940 / 85      Cardiologist: Birth/Age:        years Gender:           F                   Fellow: Encounter#:       [id number]          Surgeon:  Study: Left Heart Cath  Indications: KAYLEE HATFIELD is a 85 year old female who presents with aortic stenosis and a chest pain assessment of typical angina. Acute coronary syndrome - STEMI. Stress test performed: No. CTA performed: No. Yoanna accessed: No. LVEF Assessed: No. Cardiac arrest: No responsive following cardiac arrest. Cardiac surgical consult: No cardiac surgery not recommended. Cardiovascular Instability: Yes. Cardiogenic shock. Frailty status of patient entering lab: 9 = Terminally ill.  Procedure Description: After infiltration with 2% Lidocaine, the right femoral artery was cannulated with a modified Seldinger technique. Subsequently a 6 Tamazight sheath was placed in the right femoral artery. After infiltration of local anesthetic, the right femoral vein was identified with two-dimensional ultrasound. Under direct ultrasound visualization, the right femoral vein was cannulated with a micropuncture technique. A 8 Tamazight sheath was placed in the vein. Selective coronary catheterization was performed using a 5 Fr and 6 Fr catheter(s) exchanged over a guide wire to cannulate the coronary arteries. A JL 4 tip catheter was used for left coronary injections. A JR 4 tip catheter was used for right coronary injections. Multiple injections of contrast were made into the left and right coronary arteries with angiograms recorded in multiple projections. After completion of the procedure, the arterial sheath was intact, to be removed in the holding area. Post-procedure, the venous sheath was intact, to be removed in the holding area.  Coronary Angiography: The coronary circulation is right dominant.  Left Main Coronary Artery: The left main coronary artery is a  normal caliber vessel. The left main arises normally from the left coronary sinus of Valsalva, bifurcates into the LAD and circumflex coronary arteries and gives rise to the ramus intermedius artery. The left main coronary artery showed no significant disease or stenosis greater than 30%.  Left Anterior Descending Coronary Artery Distribution: The left anterior descending coronary artery is a normal caliber vessel. The LAD arises normally from the left main coronary artery, wraps around the apex of the LV, gives rise to the 1st diagonal branch and 2nd diagonal branch. The LAD demonstrated no significant disease or stenosis greater than 30%.  Circumflex Coronary Artery Distribution: The circumflex coronary artery is a normal caliber vessel. The circumflex arises normally from the left main coronary artery, terminates in the AV groove and giving rise to the first obtuse marginal. The circumflex revealed no significant disease or stenosis greater than 30%.  Right Coronary Artery Distribution: The right coronary artery is a normal caliber vessel. The RCA arises normally from the right sinus of Valsalva, supplies the right posterolateral descending artery and supplies the posterolateral system. The RCA showed no significant disease or stenosis greater than 30%.  Valve Findings: 4+ aortic valve insufficiency was seen.  Hemo Personnel: +----------------+---------+ Name            Duty      +----------------+---------+ Mohinder Espinoza MD 1 +----------------+---------+  Hemodynamic Pressures:  +----+--------------------+----------+-------------+--------------+---------+ Site     Date Time      Phase NameSystolic mmHgDiastolic mmHgMean mmHg +----+--------------------+----------+-------------+--------------+---------+   AO7/14/2025 6:20:09 PM  AIR REST          113            19       61 +----+--------------------+----------+-------------+--------------+---------+   AO7/14/2025 6:24:11 PM  AIR REST            89            20       49 +----+--------------------+----------+-------------+--------------+---------+   AO7/14/2025 6:38:08 PM  AIR REST           56            14       31 +----+--------------------+----------+-------------+--------------+---------+   AO7/14/2025 6:43:28 PM  AIR REST           74            16       38 +----+--------------------+----------+-------------+--------------+---------+  Complications: No in-lab complications observed.  Cardiac Cath Post Procedure Notes: Post Procedure Diagnosis: Angiographically normal coronary arteries. Blood Loss:               Estimated blood loss during the procedure was 5ml mls. Specimens Removed:        Number of specimen(s) removed: none. ____________________________________________________________________________________ CONCLUSIONS:  1. Angiographically normal right dominant system.  2. Severe prosthetic AR (prior TAVR).  3. Cardiogenic shock requiring intubation as the patient was unable to lay flat for the catheterization.  4. Initiation of IV epinepherine to support blood pressure.  5. SHOCK Call placed. After a discussion with the Shock team it was decided that no interventional nor surgical procedure would result in a meaningful recovery. This discussion was explained to her  and it was decided to transfer the patient to the ICU for de-escalation of care. ICD 10 Codes: ST elevation (STEMI) myocardial infarction involving right coronary artery-I21.11; Cardiogenic shock-R57.0  CPT Codes: Left Heart Cath (visualization of coronaries) and LV-68052  [id number] Mohinder Espinoza MD Performing Physician Electronically signed by [id number] Mohinder Espinoza MD on 7/15/2025 at 7:28:11 AM  ** Final **         Assessment & Plan  Flash pulmonary edema      Neuro:   #Metabolic encephalopathy-resolved  -Continue neurochecks per protocol  -Monitor CAM-ICU  -Tylenol as needed for pain control  -Cont home Buspar and Remeron    Cardiovascular:  #Inferior  STEMI  #Cardiogenic shock  #Severe aortic regurgitation  #S/P TAVR 2019  -Continuous cardiac monitoring  -Monitor hemodynamics, currently hemodynamically stable  -Episode of tachycardia 150s to 60s and hypertension SBP 220s this morning.  Attempted Valsalva maneuver with no response.  Improved and eventually resolved with metoprolol 5 mg IV x 1. I remained at bedside during the instability  -Maintain Goal MAP > 65  -TTE- EF normal at 65% with severe prosthetic aortic regurgitation  -Plan for ARASELI today. Will give fluid bolus prior to departing to decrease risk of hypotension  -Cardiology following, appreciate recommendations  -Monitor and optimize electrolytes, keep K > 4.0, Mag > 2.0  -Continue to monitor and trend troponin 29->6251  -Repeat EKG this morning  -Hold antihypertensives for now  -Resume home Zetia, ASA and statin    Pulmonary:   #Acute hypoxic respiratory failure requiring intubation  #Pulmonary edema  -Was initially intubated, compassionate withdrawal; patient did well  -Weaning Oxymizer this morning  -Continuous pulse oximetry   -Wean O2 to maintain SPO2 > 90%  -Aggressive chest physiotherapy/aggressive pulmonary toileting/bronchial hygiene  -Encourage incentive spirometer  -OOB to chair as tolerated    Gastrointestinal:   - N.p.o. for ARASELI.  Resume diet afterwards  -No indication for GI prophylaxis at this time  -Bowel regimen: Colace and Miralax   -Last BM: Unknown    Renal:   #STEPHANE  -No indication for indwelling osborne catheter  -Maintain external urinary device  -Baseline sr creatinine 0.80  -Monitor and trend sr Creatinine  -Electrolytes: Replete per protocol, goal K>4 Phos >3 Mg >2  Net IO Since Admission: 563.58 mL [07/16/25 0819]  Results from last 72 hours   Lab Units 07/16/25  0454 07/15/25  0439   BUN mg/dL 53* 39*   CREATININE mg/dL 1.41* 1.35*         Endocrine:   -Monitor blood glucose, blood sugars stable, no indication for Novolog SSC for glycemic control a this time  -Goal BS <  180  -Follow hypoglycemic protocol  -TSH 4.07 2025  Results from last 7 days   Lab Units 25  0734 25  0454 07/15/25  1635 07/15/25  1131 07/15/25  0757 07/15/25  0439   POCT GLUCOSE mg/dL 115*  --  115* 112* 105*  --    GLUCOSE mg/dL  --  129*  --   --   --  122*      Hematology:   -Monitor HH  -Cont heparin for VTE prophylaxis  -Maintain bilateral SCDs  Results from last 7 days   Lab Units 25  0454 07/15/25  0439   HEMOGLOBIN g/dL 15.5 16.9*   HEMATOCRIT % 48.1* 53.0*   PLATELETS AUTO x10*3/uL 189 183       Infectious Disease:   #Leukocytosis  #Possible Pneumonia  -Procalcitonin 0.14  -Blood cultures in progress  -RSV PCR negative  - Legionella antigen pending  -Cont ceftriaxone and azithromycin  Results from last 7 days   Lab Units 25  0454 07/15/25  0439   WBC AUTO x10*3/uL 17.9* 17.9*     Temp (24hrs), Av.7 °C (98 °F), Min:36.6 °C (97.9 °F), Max:36.8 °C (98.2 °F)     Musculoskeletal/Integumentary:   -Skin and wound care per protocol  -PT/OT to evaluate and treat    Lines/Tubes/Drains:   PIV  External urinary device    CODE STATUS: DNR and No Intubation    Disposition:   -Remain in the intensive care unit under the care of the critical care team    ABCDEF Checklist  Analgesia: Spontaneous awakening trial to be pursued if clinically appropriate. RASS goal reviewed  Breathing: Spontaneous breathing trial to be pursued if clinically appropriate. Mechanical power of assisted ventilation reviewed  Choice of analgesia/sedation: Analgesic and sedative agents adjusted per clinical context.   Delirium assessed by CAM, will avoid exacerbating factors  Early mobility and exercise: Physical and occupational therapy engaged  Family: Plan of care, overall trajectory of patient shared with family. Questions elicited and answered as appropriate.       Due to the high probability of life threatening clinical decompensation, the patient required critical care time evaluating and managing this  patient.  Critical care time included obtaining a history, examining the patient, ordering and reviewing studies, discussing, developing, and implementing a management plan, evaluating the patient's response to treatment, and discussion with other care team providers. I saw and evaluated the patient myself.  Critical care time was performed exclusive of billable procedures.      Lorraine Lance, APRN-CNP  Critical Care Medicine      I spent 60 minutes in the professional and overall care of this patient.                    [1] aspirin, 81 mg, oral, Daily  atorvastatin, 20 mg, oral, Nightly  azithromycin, 500 mg, intravenous, q24h  busPIRone, 5 mg, oral, BID  cefTRIAXone, 1 g, intravenous, q24h  cholecalciferol, 25 mcg, oral, Daily  docusate sodium, 100 mg, oral, BID  ezetimibe, 10 mg, oral, Nightly  latanoprost, 1 drop, Both Eyes, Nightly  mirtazapine, 7.5 mg, oral, Nightly  oxygen, , inhalation, Continuous - Inhalation  polyethylene glycol, 17 g, oral, Daily     [2]    [3] PRN medications: acetaminophen, ALPRAZolam

## 2025-07-16 NOTE — PROGRESS NOTES
Occupational Therapy                 Therapy Communication Note    Patient Name: Betty Stein  MRN: [id number]  Department: [facility]  Room: 2120/2120-A  Today's Date: 7/16/2025     Discipline: Occupational Therapy    Comment: Received orders for OT eval 7/15. Completed chart review, and attempted OT eval this am. Per pt's RN, pt increasingly tachy with minimal activity, and pt with plans for ARASELI this date. Will hold OT eval and complete as appropriate.

## 2025-07-16 NOTE — PROGRESS NOTES
07/16/25 0938   Discharge Planning   Living Arrangements Spouse/significant other   Support Systems Spouse/significant other   Type of Residence Private residence   Home or Post Acute Services None   Expected Discharge Disposition Home     Met with patient and spouse at bedside. Pt admitted for STEMI and flash pulmonary edema. Pt lives with spouse at [facility]. Pt was independent with no HHC or DME. PCP is oRnald Lowry. Pt feels she is able to manage her health and understands her medications. Spouse provides transportation and obtains meds. No financial concerns. Pt plans to return home with no new discharge needs. TCC team will continue to follow. Spouse will provide transport home.

## 2025-07-16 NOTE — PROCEDURES
Central Line    Date/Time: 7/16/2025 6:20 PM    Performed by: Kee Stokes DO  Authorized by: Seun Sandhu DO    Consent:     Consent obtained:  Verbal    Consent given by:  Spouse    Risks, benefits, and alternatives were discussed: yes      Risks discussed:  Arterial puncture, bleeding, incorrect placement, infection, nerve damage and pneumothorax    Alternatives discussed:  No treatment and delayed treatment  Universal protocol:     Procedure explained and questions answered to patient or proxy's satisfaction: yes      Patient identity confirmed:  Arm band  Pre-procedure details:     Indication(s): central venous access      Hand hygiene: Hand hygiene performed prior to insertion      Sterile barrier technique: All elements of maximal sterile technique followed      Skin preparation:  Chlorhexidine    Skin preparation agent: Skin preparation agent completely dried prior to procedure    Sedation:     Sedation type:  Anxiolysis  Anesthesia:     Anesthesia method:  Local infiltration    Local anesthetic:  Lidocaine 1% w/o epi  Procedure details:     Location:  R internal jugular    Patient position:  Trendelenburg    Procedural supplies:  Triple lumen    Catheter size:  7 Fr    Catheter length:  16    Landmarks identified: yes      Ultrasound guidance: yes      Ultrasound guidance timing: real time      Sterile ultrasound techniques: Sterile gel and sterile probe covers were used      Number of attempts:  1    Successful placement: yes    Post-procedure details:     Post-procedure:  Dressing applied and line sutured    Assessment:  Blood return through all ports, no pneumothorax on x-ray, placement verified by x-ray and free fluid flow    Procedure completion:  Tolerated

## 2025-07-16 NOTE — ANESTHESIA PROCEDURE NOTES
Airway  Date/Time: 7/16/2025 3:34 PM  Reason: emergent    Airway not difficult    Staffing  Performed: CRNA and attending   Authorized by: Jayden Weinberg DO    Performed by: ELAINA Vann-RONI  Patient location during procedure: ICU    Consent for Airway (if performed for an anesthetic, see related documentation for consents)   Consent given by: spouse and patient    Patient Condition  Indications for airway management: anesthesia  Patient position: sniffing  MILS maintained throughout  Sedation level: deep     Final Airway Details   Preoxygenated: yes  Final airway type: endotracheal airway  Successful airway: ETT  Cuffed: yes   Successful intubation technique: video laryngoscopy  Adjuncts used in placement: intubating stylet  Endotracheal tube insertion site: oral  Blade: Fabrizio  Blade size: #3  ETT size (mm): 7.0  Cormack-Lehane Classification: grade I - full view of glottis  Placement verified by: chest auscultation and capnometry   Cuff volume (mL): 5  Measured from: lips  ETT to lips (cm): 22  Number of attempts at approach: 1  Number of other approaches attempted: 0

## 2025-07-16 NOTE — POST-PROCEDURE NOTE
Physician Transition of Care Summary  Invasive Cardiovascular Lab    Physician Transition of Care Summary  Invasive Cardiovascular Lab    Procedure Date: 7/16/2025  Attending: Kolby Cedeño MD   Assistants:  Vincent (anesthesiologist), Chucho (CRNA), Kay Salgado (ICU), Terra (respiratory, Maribeth (sonographer)    Indications:   S/P TAVR, severe aortic regurgitation    Post-procedure diagnosis:   Severe prosthetic aortic regurgitation    Procedure(s):   Transesophageal Echo (ARASELI) With Possible Cardioversion    Description of the Procedure:   The procedure was done in the ICU in a fasting state, and pre-procedure timeout was performed.  Routine monitors were applied, including EKG, BP cuff, pulse oximetry, and end tidal CO2 monitor.  Oxygen 6 LPM by nasal cannula was applied.  Sedation was performed by anesthesiology, present during the entire case.  The oropharynx was anesthetized with Benzocaine spray.  The ARASELI probe was placed in the oropharynx, but the patient became agitated.  The patient was electively intubated, with oxygen saturations of 88-89% on 100% facemask.  Following intubation, O2 saturations hovered in the mid 80s eventually returning to the low 90s while intubated and on a ventilator.  A transesophageal imaging probe was advanced atraumatically into the esophagus, and two dimensional and Doppler images were recorded.  Agitated saline was administered, and contrast images were recorded.  Findings included:      Procedure Findings:   Normal left ventricular size and function.  TAVR prosthesis in place with severe aortic regurgitation.  Views of the valve are technically limited, but it appears that this may be due to valvular leak and not perivalvular leak.  3+ tricuspid regurgitation, 2+ mitral regurgitation no evidence of intracardiac thrombus or mass.  Normal appearing ascending aorta.  No evidence of intracardiac shunt by color Doppler or agitated saline contrast study    On the basis of these  findings, a transfer center has been notified for transfer to [facility] for reevaluation for redo TAVR.    Please see Syngo for complete report      Complications:   The patient tolerated the procedure well, and there were no complications.  Postprocedure timeout was performed.      Estimated Blood Loss:   None      Electronically signed by: Kolby Cedeño MD, 7/16/2025 4:11 PM

## 2025-07-16 NOTE — NURSING NOTE
Patient started the  day with oxymizer 15 liters. Patient was recovering from cardiac catherization. Patient was NPO at the beginning of the shift at 0700. Patient changed t regular diet 0800. Patient and  ordered a tray. Per  when this RN asked if patient had eaten stated yes pt ate scrambled eggs. At 0900 decision for ARASELI was made for cardiology and attend ing in the ICU. Anesthesia came to the floor as well as biomed for bedside ARASELI. Clarification was made to the team that  stated to me she had eaten scrambled eggs in the AM. However, the anesthesia tem stated that she had not eaten. This RN clarified with patients . Patient's  stated that he misunderstood and was speaking abut himself. I notified team. ARASELI is now tomorrow pt is to be NPO at 12 am. Notified oncoming RN.

## 2025-07-16 NOTE — SIGNIFICANT EVENT
Critical Care Significant Event Note    Patient had bedside ARASELI in the intensive care unit with plan for conscious sedation with etomidate    Patient given periodic doses of 4 mg of etomidate and then became progressively hypoxic    She subsequently became apneic with desaturation without ability to recover and was emergently endotracheally intubated and placed on mechanical ventilatory support by anesthesiology    She took some time to recover from marginal hypoxia, arterial blood gas within normal limits    The patient then had bedside ARASELI with Dr. Cedeño, which showed severe AR with no vegetations on the valve    The plan is to discuss with downtown to consider redo TAVR, the patient to remain on the ventilator until plans for potential surgery are elucidated    Will plan for arterial line placement, maintain adequate preload and forward flow, minimize PEEP, avoid bradycardia    Discussed extensively with cardiology, anesthesiology and the patient's  at the bedside    Patient remains DNR per  wishes    Patient remains on AC/VC, 10 100%, respiratory rate of 14 tidal volume of 400 PEEP of 5                CCT is 77 mins

## 2025-07-16 NOTE — ANESTHESIA POSTPROCEDURE EVALUATION
Patient: Betty Stein    Procedure Summary       Date: 07/16/25 [place] [facility] [place]    Anesthesia Start: 1520 Anesthesia Stop: 1555    Procedure: TRANSESOPHAGEAL ECHO (ARASELI) W/ POSSIBLE CARDIOVERSION Diagnosis:       Nonrheumatic aortic valve insufficiency      (acute aortic regurgitation, S/P TAVR)    Scheduled Providers:  Responsible Provider: Jayden Weinberg DO    Anesthesia Type: general ASA Status: 4            Anesthesia Type: general    Vitals Value Taken Time   /61 07/16/25 17:33   Temp 36 07/16/25 17:41   Pulse 92 07/16/25 17:41   Resp 34 07/16/25 17:41   SpO2 95 % 07/16/25 17:41   Vitals shown include unfiled device data.    Anesthesia Post Evaluation    Patient location during evaluation: ICU  Patient participation: complete - patient cannot participate  Level of consciousness: sedated  Pain score: 0  Pain management: adequate  Airway patency: patent  Cardiovascular status: stable  Respiratory status: acceptable and ETT  Hydration status: acceptable  Postoperative Nausea and Vomiting: none        No notable events documented.

## 2025-07-16 NOTE — ANESTHESIA PREPROCEDURE EVALUATION
Patient: Betty Stein    Procedure Information       Date/Time: 07/16/25 0955    Procedure: TRANSESOPHAGEAL ECHO (ARASELI) W/ POSSIBLE CARDIOVERSION    Location: [facility] [place]            Relevant Problems   Cardiac   (+) Atypical chest pain   (+) CAD (coronary artery disease)   (+) HLD (hyperlipidemia)   (+) HTN (hypertension)      Neuro   (+) Generalized anxiety disorder      Endocrine   (+) Acquired hypothyroidism      Hematology   (+) Anemia      Musculoskeletal   (+) Chronic low back pain       Clinical information reviewed:    Allergies                NPO Detail:  No data recorded     PHYSICAL EXAM    Anesthesia Plan    History of general anesthesia?: yes  History of complications of general anesthesia?: no    ASA 4     general     The patient is not a current smoker.  Patient was previously instructed to abstain from smoking on day of procedure.  Patient did not smoke on day of procedure.  Education provided regarding risk of obstructive sleep apnea.  intravenous induction   Postoperative pain plan includes opioids.  Anesthetic plan and risks discussed with patient.  Use of blood products discussed with patient who.    Plan discussed with CAA.

## 2025-07-17 PROBLEM — R57.0 CARDIOGENIC SHOCK (MULTI): Status: ACTIVE | Noted: 2025-01-01

## 2025-07-17 PROBLEM — J81.0 FLASH PULMONARY EDEMA: Status: RESOLVED | Noted: 2025-01-01 | Resolved: 2025-01-01

## 2025-07-17 PROBLEM — I21.3 STEMI (ST ELEVATION MYOCARDIAL INFARCTION) (MULTI): Status: ACTIVE | Noted: 2025-01-01

## 2025-07-17 PROBLEM — I35.1 SEVERE AORTIC REGURGITATION: Status: ACTIVE | Noted: 2025-01-01

## 2025-07-17 PROBLEM — J96.01 ACUTE HYPOXIC RESPIRATORY FAILURE: Status: ACTIVE | Noted: 2025-01-01

## 2025-07-17 PROBLEM — N17.9 AKI (ACUTE KIDNEY INJURY): Status: ACTIVE | Noted: 2025-01-01

## 2025-07-17 PROBLEM — I21.3 STEMI (ST ELEVATION MYOCARDIAL INFARCTION) (MULTI): Status: RESOLVED | Noted: 2025-01-01 | Resolved: 2025-01-01

## 2025-07-17 NOTE — CARE PLAN
Problem: Pain - Adult  Goal: Verbalizes/displays adequate comfort level or baseline comfort level  Outcome: Progressing     Problem: Safety - Adult  Goal: Free from fall injury  Outcome: Progressing     Problem: Discharge Planning  Goal: Discharge to home or other facility with appropriate resources  Outcome: Progressing     Problem: Chronic Conditions and Co-morbidities  Goal: Patient's chronic conditions and co-morbidity symptoms are monitored and maintained or improved  Outcome: Progressing     Problem: Nutrition  Goal: Nutrient intake appropriate for maintaining nutritional needs  Outcome: Progressing     Problem: Skin  Goal: Decreased wound size/increased tissue granulation at next dressing change  Outcome: Progressing  Goal: Participates in plan/prevention/treatment measures  Outcome: Progressing  Goal: Prevent/manage excess moisture  Outcome: Progressing  Flowsheets (Taken 7/17/2025 0456)  Prevent/manage excess moisture: Moisturize dry skin  Goal: Prevent/minimize sheer/friction injuries  Outcome: Progressing  Flowsheets (Taken 7/17/2025 0456)  Prevent/minimize sheer/friction injuries: Turn/reposition every 2 hours/use positioning/transfer devices  Goal: Promote/optimize nutrition  Outcome: Progressing  Goal: Promote skin healing  Outcome: Progressing     Problem: Safety - Medical Restraint  Goal: Remains free of injury from restraints (Restraint for Interference with Medical Device)  Outcome: Progressing  Flowsheets (Taken 7/17/2025 0456)  Remains free of injury from restraints (restraint for interference with medical device): Inform patient/family regarding the reason for restraint  Goal: Free from restraint(s) (Restraint for Interference with Medical Device)  Outcome: Progressing  Flowsheets (Taken 7/17/2025 0456)  Free from restraint(s) (restraint for interference with medical device): ONCE/SHIFT or MINIMUM Every 12 hours: Assess and document the continuing need for restraints     Problem: Knowledge  Deficit  Goal: Patient/family/caregiver demonstrates understanding of disease process, treatment plan, medications, and discharge instructions  Outcome: Progressing     Problem: Mechanical Ventilation  Goal: Patient Will Maintain Patent Airway  Outcome: Progressing  Goal: Oral health is maintained or improved  Outcome: Progressing  Goal: ET tube will be managed safely  Outcome: Progressing  Goal: Ability to express needs and understand communication  Outcome: Progressing  Goal: Mobility/activity is maintained at optimum level for patient  Outcome: Progressing     Problem: Pain  Goal: Takes deep breaths with improved pain control throughout the shift  Outcome: Met  Goal: Turns in bed with improved pain control throughout the shift  Outcome: Met  Goal: Free from opioid side effects throughout the shift  Outcome: Met  Goal: Free from acute confusion related to pain meds throughout the shift  Outcome: Met   The patient's goals for the shift include      The clinical goals for the shift include patient will be hemodynamically stable throughout this shift

## 2025-07-17 NOTE — SIGNIFICANT EVENT
Overnight, patient had episode of a fib RVR with heart rates 90s-150s but not sustaining over 120s and resolved on its own after about an hour. She remained stable on 0.07 of norepinephrine through out the episode. She was adequately sedating of Precedex 0.56 mcg/kg/hr and fentanyl 25 mcg/hr.     This morning, a bed became available at [facility]. Nursing notified  who was sleeping bedside in patient's room. He then requested to speak to a provider regarding potential withdrawal of care.     expressed thinking about the patient's wishes all night. He said that she told him yesterday that she would get the procedure for him. After thinking about it all night, he does not believe she would want to endure another procedure or cardiac rehab. He knows that without the procedure the patient will likely not survive this hospital visit. He acknowledges that if she did proceed with the procedure she could potentially live years longer and recover. He did call his children to discuss further. They did come to the hospital for further discussion. After answering all questions, family believe it is against the patient's wishes to continue with mechanical ventilation, vasopressors, further procedures including valve repair, or any other life supportive measures. They elected to proceed with comfort care measures.

## 2025-07-17 NOTE — NURSING NOTE
At 0515, nursing got a call from the transfer center that a bed has become available at [facility]. Spouse was notified and states he is considering withdrawing care. Roxy Dong NP aware and at bedside.     0640 - Spouse and children at bedside and state they want to withdraw care. Roxy Dong NP at bedside and DNRCC order placed.     0642 - levophed discontinued    0654 - fentanyl discontinued and 0.2 dilaudid administered for RDOS >3. Patient extubated and OG removed.     Handoff given to KAMARI Austin, who was informed of administration of PRN dilaudid and the need for follow up assessment

## 2025-07-17 NOTE — NURSING NOTE
Called Celeste from [facility] at 0740 to discuss change in code status and terminal extubation. Per Celeste, follow up after cardiac time of death.

## 2025-07-17 NOTE — CARE PLAN
Problem: Pain - Adult  Goal: Verbalizes/displays adequate comfort level or baseline comfort level  Outcome: Progressing     Problem: Safety - Adult  Goal: Free from fall injury  Outcome: Progressing     Problem: Discharge Planning  Goal: Discharge to home or other facility with appropriate resources  Outcome: Progressing     Problem: Chronic Conditions and Co-morbidities  Goal: Patient's chronic conditions and co-morbidity symptoms are monitored and maintained or improved  Outcome: Progressing     Problem: Nutrition  Goal: Nutrient intake appropriate for maintaining nutritional needs  Outcome: Progressing     Problem: Skin  Goal: Decreased wound size/increased tissue granulation at next dressing change  Outcome: Progressing  Goal: Participates in plan/prevention/treatment measures  Outcome: Progressing  Goal: Prevent/manage excess moisture  Outcome: Progressing  Goal: Prevent/minimize sheer/friction injuries  Outcome: Progressing  Goal: Promote/optimize nutrition  Outcome: Progressing  Goal: Promote skin healing  Outcome: Progressing     Problem: Safety - Medical Restraint  Goal: Remains free of injury from restraints (Restraint for Interference with Medical Device)  Outcome: Progressing  Goal: Free from restraint(s) (Restraint for Interference with Medical Device)  Outcome: Progressing   The patient's goals for the shift include  To keep pulse oximeter > 92%. Patient to remain HDS through out the shift. Patient had ARASELI scheduled at the bedside. Patient intubated to protect airway. Patient placed on sedation. Pt was placed on pressors to maintain Mean of 55-60 per Dr. Sandhu.     The clinical goals for the shift include o keep pulse oximeter > 92%. Patient to remain HDS through out the shift. Patient had ARASELI scheduled at the bedside. Patient intubated to protect airway. Patient placed on sedation. Pt was placed on pressors to maintain Mean of 55-60 per Dr. Sandhu.     Over the shift, the patient did not make  progress toward the following goals. Barriers to progression include HDS and cardiac status. Recommendations to address these barriers include CMC .

## 2025-07-17 NOTE — PROGRESS NOTES
Spiritual Care Visit  Spiritual Care Request    Reason for Visit:  Routine Visit: Introduction     Request Received From:       Focus of Care:  Visited With: Patient and family together         Refer to :          Spiritual Care Assessment    Spiritual Assessment:                      Care Provided:  Intended Effects: Promote sense of peace, Preserve dignity and respect, Meaning-making  Methods: Offer spiritual/Restoration support  Interventions: Share words of hope and inspiration, [place], Provide Grief Processing Session    Sense of Community and or Gnosticist Affiliation:  Sikh         Addressed Needs/Concerns and/or Alejandra Through:          Outcome:        Advance Directives:         Spiritual Care Annotation    Annotation:  Patient was surrounded by her  and son and daughter.   spoke comforting words and showed empathy.   prayed.

## 2025-07-17 NOTE — ASSESSMENT & PLAN NOTE
-Family denied transfer to tertiary facility for severe aortic regurgitation evaluation  -Hemodynamically stable  -Patient compassionately extubated this morning with primary focus of comfort.  End-of-life order set complete.  Will discontinue Precedex and continue prn comfort medications  -DC arterial line  -Hospice consulted to meet with family  -Transfer pt to rnf

## 2025-07-17 NOTE — CONSULTS
"Reason For Consult    Goals/Hospice Coordination.    History Of Present Illness  Betty Stein is a 85 y.o. female presenting with acute severe aortic regurgitation.      Past Medical History  She has a past medical history of Encounter for examination for normal comparison and control in clinical research program (01/21/2019), Inflamed seborrheic keratosis (05/14/2020), Ingrowing nail (04/15/2021), Nonrheumatic aortic (valve) stenosis (05/10/2019), and Personal history of other diseases of the musculoskeletal system and connective tissue (11/11/2021).    Surgical History  She has a past surgical history that includes Other surgical history (06/29/2018); Other surgical history (11/11/2021); Other surgical history (11/11/2021); Other surgical history (11/11/2021); Other surgical history (11/11/2021); Other surgical history (11/11/2021); Other surgical history (11/11/2021); Other surgical history (11/11/2021); Other surgical history (11/18/2021); Cardiac catheterization (N/A, 7/14/2025); and Cardiac catheterization (N/A, 7/14/2025).     Social History  She reports that she has never smoked. She has never been exposed to tobacco smoke. She has never used smokeless tobacco. She reports that she does not currently use alcohol after a past usage of about 1.0 standard drink of alcohol per week. She reports that she does not use drugs.    Family History  Family History[1]     Allergies  Patient has no known allergies.       Last Recorded Vitals  Blood pressure 172/61, pulse (!) 130, temperature 36.4 °C (97.5 °F), temperature source Temporal, resp. rate (!) 32, height 1.702 m (5' 7\"), weight 59 kg (130 lb 1.1 oz), SpO2 91%.         Assessment/Plan     Family bedside, early this morning, requested terminal wean. DNRCC , comfort meds in place.    Met with family bedside, discussed pmhx, current plan of care, and Hospice transition. Family requests not to meet with Hospice today or to tx pt to [facility] as pt is too fragile. " Family agreeable to meet with [facility] in a.m. if needed. Referral sent in [facility]. Support provided.     [facility] RN to meet with pt/family bedside on 7/18/25 time TBD. Await results of Hospice meeting, will follow.     Caitlyn White, LCSW         [1]   Family History  Problem Relation Name Age of Onset    Coronary artery disease Mother      No Known Problems Father

## 2025-07-17 NOTE — SIGNIFICANT EVENT
Patient Name: Betty Steni   YOB: 1940    ICU Downgrade // Medicine Accept Note   This is an 85 year old lady with known history of hyperlipidemia, hypertension, CAD with RCA stent, aortic valve replacement who initially presented 7/14 with chest pain, found to have STEMI, although with no implicated lesion during cardiac cath; she was intubated during the procedure, due to inability to lie flat, TTE with severe aortic regurgitation; patient was able to be extubated, although ultimately re-intubated during ARASELI, which also required intubation, and confirmed severe aortic regurgitation; cardiology consultant recommend transfer to tertiary center, although family ultimately opted to have patient extubated for plan to transition to comfort care; hospice consult was placed, patient downgraded to medicine team for end of life care pending formal hospice consents and arrangements.    Mikal Espinoza MD  [facility] Medicine    This document was generated in whole or in part using the Dragon One medical voice recognition software and there may be some incorrect words/wording, spelling, or punctuation errors that were not corrected prior to finalization in the medical record.

## 2025-07-17 NOTE — PROGRESS NOTES
"Betty Stein is a 85 y.o. female on day 3 of admission presenting with Flash pulmonary edema.    Subjective   Compassionately extubated early this morning with focus on comfort. Pt wakens easily and states she feels comfortable       Objective     Physical Exam  Constitutional:       Appearance: She is ill-appearing.      Comments: drowsy   HENT:      Mouth/Throat:      Mouth: Mucous membranes are dry.   Pulmonary:      Effort: Pulmonary effort is normal.         Last Recorded Vitals  Blood pressure 172/61, pulse (!) 124, temperature 36.7 °C (98.1 °F), temperature source Temporal, resp. rate 24, height 1.702 m (5' 7\"), weight 59 kg (130 lb 1.1 oz), SpO2 96%.  Intake/Output last 3 Shifts:  I/O last 3 completed shifts:  In: 1100.9 (18.7 mL/kg) [I.V.:570.9 (9.7 mL/kg); NG/GT:180; IV Piggyback:350]  Out: 140 (2.4 mL/kg) [Urine:140 (0.1 mL/kg/hr)]  Weight: 59 kg     Relevant Results  Scheduled medications  Scheduled Medications[1]  Continuous medications  Continuous Medications[2]  PRN medications  PRN Medications[3]    Results for orders placed or performed during the hospital encounter of 07/14/25 (from the past 24 hours)   Blood Gas Arterial Full Panel Unsolicited   Result Value Ref Range    POCT pH, Arterial 7.40 7.38 - 7.42 pH    POCT pCO2, Arterial 32 (L) 38 - 42 mm Hg    POCT pO2, Arterial 69 (L) 85 - 95 mm Hg    POCT SO2, Arterial 94 94 - 100 %    POCT Oxy Hemoglobin, Arterial 91.6 (L) 94.0 - 98.0 %    POCT Hematocrit Calculated, Arterial 47.0 (H) 36.0 - 46.0 %    POCT Sodium, Arterial 130 (L) 136 - 145 mmol/L    POCT Potassium, Arterial 4.7 3.5 - 5.3 mmol/L    POCT Chloride, Arterial 100 98 - 107 mmol/L    POCT Ionized Calcium, Arterial 1.17 1.10 - 1.33 mmol/L    POCT Glucose, Arterial 199 (H) 74 - 99 mg/dL    POCT Lactate, Arterial 2.7 (H) 0.4 - 2.0 mmol/L    POCT Base Excess, Arterial -3.9 (L) -2.0 - 3.0 mmol/L    POCT HCO3 Calculated, Arterial 19.8 (L) 22.0 - 26.0 mmol/L    POCT Hemoglobin, Arterial 15.8 " 12.0 - 16.0 g/dL    POCT Anion Gap, Arterial 15 10 - 25 mmo/L    Patient Temperature      FiO2 100 %    Ventilator Mode A/C     Tidal Volume 400 mL    Peep CHM2O 5.0 cm H2O    Site of Arterial Puncture LR     Ross's Test LR    Coox Panel, Arterial Unsolicited   Result Value Ref Range    POCT Hemoglobin, Arterial 16.1 (H) 12.0 - 16.0 g/dL    POCT Oxy Hemoglobin, Arterial 91.5 (L) 94.0 - 98.0 %    POCT Carboxyhemoglobin, Arterial 1.7 %    POCT Methemoglobin, Arterial 1.1 0.0 - 1.5 %    POCT Deoxy Hemoglobin, Arterial 5.7 (H) 0.0 - 5.0 %    Site of Arterial Puncture LR     Ross's Test LR    POCT GLUCOSE   Result Value Ref Range    POCT Glucose 122 (H) 74 - 99 mg/dL   POCT GLUCOSE   Result Value Ref Range    POCT Glucose 130 (H) 74 - 99 mg/dL   Comprehensive metabolic panel   Result Value Ref Range    Glucose 136 (H) 74 - 99 mg/dL    Sodium 134 (L) 136 - 145 mmol/L    Potassium 4.2 3.5 - 5.3 mmol/L    Chloride 103 98 - 107 mmol/L    Bicarbonate 20 (L) 21 - 32 mmol/L    Anion Gap 15 10 - 20 mmol/L    Urea Nitrogen 67 (H) 6 - 23 mg/dL    Creatinine 1.51 (H) 0.50 - 1.05 mg/dL    eGFR 34 (L) >60 mL/min/1.73m*2    Calcium 8.5 (L) 8.6 - 10.3 mg/dL    Albumin 3.1 (L) 3.4 - 5.0 g/dL    Alkaline Phosphatase 46 33 - 136 U/L    Total Protein 5.7 (L) 6.4 - 8.2 g/dL    AST 29 9 - 39 U/L    Bilirubin, Total 0.7 0.0 - 1.2 mg/dL    ALT 16 7 - 45 U/L   Phosphorus   Result Value Ref Range    Phosphorus 4.2 2.5 - 4.9 mg/dL   Magnesium   Result Value Ref Range    Magnesium 2.25 1.60 - 2.40 mg/dL   CBC   Result Value Ref Range    WBC 17.0 (H) 4.4 - 11.3 x10*3/uL    nRBC 0.0 0.0 - 0.0 /100 WBCs    RBC 4.35 4.00 - 5.20 x10*6/uL    Hemoglobin 13.8 12.0 - 16.0 g/dL    Hematocrit 40.6 36.0 - 46.0 %    MCV 93 80 - 100 fL    MCH 31.7 26.0 - 34.0 pg    MCHC 34.0 32.0 - 36.0 g/dL    RDW 12.4 11.5 - 14.5 %    Platelets 156 150 - 450 x10*3/uL   Blood Gas Arterial Full Panel   Result Value Ref Range    POCT pH, Arterial 7.44 (H) 7.38 - 7.42 pH     POCT pCO2, Arterial 28 (L) 38 - 42 mm Hg    POCT pO2, Arterial 66 (L) 85 - 95 mm Hg    POCT SO2, Arterial 95 94 - 100 %    POCT Oxy Hemoglobin, Arterial 92.1 (L) 94.0 - 98.0 %    POCT Hematocrit Calculated, Arterial 44.0 36.0 - 46.0 %    POCT Sodium, Arterial 131 (L) 136 - 145 mmol/L    POCT Potassium, Arterial 4.3 3.5 - 5.3 mmol/L    POCT Chloride, Arterial 101 98 - 107 mmol/L    POCT Ionized Calcium, Arterial 1.12 1.10 - 1.33 mmol/L    POCT Glucose, Arterial 133 (H) 74 - 99 mg/dL    POCT Lactate, Arterial 1.4 0.4 - 2.0 mmol/L    POCT Base Excess, Arterial -3.7 (L) -2.0 - 3.0 mmol/L    POCT HCO3 Calculated, Arterial 19.0 (L) 22.0 - 26.0 mmol/L    POCT Hemoglobin, Arterial 14.5 12.0 - 16.0 g/dL    POCT Anion Gap, Arterial 15 10 - 25 mmo/L    Patient Temperature      FiO2 65 %    Site of Arterial Puncture A LINE     Ross's Test A LINE                          Assessment & Plan  Severe aortic regurgitation    Cardiogenic shock (Multi)    Acute hypoxic respiratory failure    STEPHANE (acute kidney injury)    -Family denied transfer to tertiary facility for severe aortic regurgitation evaluation  -Hemodynamically stable  -Patient compassionately extubated this morning with primary focus of comfort.  End-of-life order set complete.  Will discontinue Precedex and continue prn comfort medications  -DC arterial line  -Hospice consulted to meet with family  -Transfer pt to [facility]          I spent 25 minutes in the professional and overall care of this patient.      Lorraine Lance, APRN-CNP         [1] busPIRone, 5 mg, orogastric tube, BID  docusate sodium, 100 mg, orogastric tube, BID  heparin, 5,000 Units, subcutaneous, q8h  latanoprost, 1 drop, Both Eyes, Nightly  lidocaine, 1 patch, transdermal, Daily  mirtazapine, 7.5 mg, orogastric tube, Nightly  pantoprazole, 40 mg, oral, Daily before breakfast   Or  pantoprazole, 40 mg, intravenous, Daily before breakfast  polyethylene glycol, 17 g, oral, Daily  [2]    [3] PRN  medications: acetaminophen, ALPRAZolam, HYDROmorphone, midazolam, oxygen

## 2025-07-18 NOTE — NURSING NOTE
Family at the bedside for time of death 920. Post mortem care provided at the bedside. Notified attending and current consults of expiration. Family has chosen [facility]  home. Son is calling  home at this time. Notified nursing supervisor as well.

## 2025-07-18 NOTE — SIGNIFICANT EVENT
Called to see patient for unresponsiveness. On exam the patient did not respond to verbal or physical stimuli. Absent heart and breath sounds for one minute, no response to noxious stimuli, and absent corneal reflex. Absent peripheral pulses. Pupils are fixed and dilated. Patient pronounced dead at 0920. Dr. Melo and Dr. Sandhu notified. Next of kin/family notified.

## 2025-07-18 NOTE — NURSING NOTE
Notified Dr. Melo concerning patient current status. Unable  to obtain a pulse oximeter at this time. Dr. Farmer is aware. Pt remains on the monitor at this time.   is calling his kids at this time. They are on their way to the hospital.

## 2025-07-18 NOTE — DISCHARGE SUMMARY
Discharge Diagnosis  Flash pulmonary edema       Issues Requiring Follow-Up: n/a    Discharge Meds     Medication List      START taking these medications     busPIRone 5 mg tablet; Commonly known as: Buspar; Take 1 tablet (5 mg)   by mouth 2 times a day.     CONTINUE taking these medications     ALPRAZolam 0.25 mg tablet; Commonly known as: Xanax; Take 1 tablet (0.25   mg) by mouth 2 times a day as needed for anxiety.   aspirin 81 mg EC tablet; Take 1 tablet (81 mg) by mouth once daily.   atorvastatin 20 mg tablet; Commonly known as: Lipitor; TAKE 1 TABLET AT   BEDTIME   cholecalciferol 25 mcg (1,000 units) capsule; Commonly known as: Vitamin   D-3   dorzolamide-timoloL 22.3-6.8 mg/mL ophthalmic solution; Commonly known   as: Cosopt   ezetimibe 10 mg tablet; Commonly known as: Zetia; TAKE 1 TABLET AT   BEDTIME   hydrocortisone 2.5 % lotion   latanoprost 0.005 % ophthalmic solution; Commonly known as: Xalatan   mirtazapine 7.5 mg tablet; Commonly known as: Remeron; Take 1 tablet   (7.5 mg) by mouth once daily at bedtime.   MULTI VITAMIN ORAL   pentoxifylline 400 mg ER tablet; Commonly known as: Trental     STOP taking these medications     amLODIPine 5 mg tablet; Commonly known as: Norvasc   irbesartan-hydrochlorothiazide 150-12.5 mg tablet; Commonly known as:   Avalide   nitroglycerin 0.4 mg SL tablet; Commonly known as: Nitrostat       Test Results Pending At Discharge  Pending Labs       Order Current Status    Blood Culture Preliminary result    Blood Culture Preliminary result            Hospital Course   HPI/ED Course:  Patient is an 85-year-old female with a history of TAVR (03/2019), CAD (s/p PCI with FREDERICK to RCA 2013), HTN, HLD, rheumatic fever hypothyroidism, anemia, generalized anxiety disorder presenting as an admit from interventional cardiology in the Cath Lab.  She arrived to the ER with concern for possible STEMI.  She presented to the ER this afternoon for substernal chest pain that started  approximately 4 PM.  He radiated to her left shoulder and down her arm.  No consistent alleviating or exacerbating factors.  She noted developing dyspnea in the ER requiring 4 L nasal cannula which eventually transition to a nonrebreather to maintain oxygen saturations greater than 90%.  Repeat EKG in the ER showed ST elevations in the inferior leads and diffuse ST depressions in the anterior and lateral leads.  Patient taken to the Cath Lab.  In the Cath Lab, patient desaturated on a nonrebreather down to the low 80s and was unable to lie flat due to her respiratory distress.  Chest x-ray obtained in the ED resulted demonstrating significant pulmonary vascular congestion with pulmonary edema.  The decision was made to intubate the patient in the Cath Lab.  This was accomplished using 15 mg of etomidate and 80 mg of systemic cooling via RSI.  Patient intubated via glide scope.  The coronary catheterization was then performed.  There was no remarkable coronary artery disease amenable to PCI.  Alternative diagnoses for ST elevations and chest pain were explored.  Her blood pressure dropped from normotensive to 80/30 while still in the Cath Lab.  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  Dr. Espinoza had a conversation with the shock team at [facility].  Reportedly, they were informed that putting in an LVAD/Impella would be unwise as the patient had a vegetation or thrombus attached to the valve the Impella would dislodge the clot and cause further complications.  Additionally, with the patient's advanced age, there is no data on EMERALD TAVR.  In consideration of this and the patient's clinical decompensation, it would be unlikely that she would be able to be stabilized for transport and to acquire the necessary preoperative imaging.  They ultimately determined that they would not be able to safely offer any further intervention.  They discussed situation with the  .  Shared decision making was engaged in.  They made the decision to not engage in further intervention at this time.  Patient admitted to the ICU given intubated status as the  wished to have some additional time with her.  Patient made DNR CCA allowing for mechanical ventilation.  They will likely pursue comfort measures after additional discussion.    Hospital course: Patient was admitted to the hospital with chest pain and was found to have concerns for STEMI.  Patient was unable to lie flat and requiring 4 L nasal cannula so patient was intubated for airway protection and taken to Cath Lab for left heart catheterization which did not reveal any stentable lesion.  Patient was extubated and doing fairly poorly and was thought to be imminently about to pass away however patient did improve enough to wake up and discussed with family her wishes.  Patient did ultimately undergo transesophageal echocardiogram which revealed severe aortic regurgitation.  Patient required intubation for this as well.  Patient eventually extubated again and patient was once again able to speak to family and ultimately wished for comfort measures only to be pursued.  Patient was seen by palliative care and in the process of being sign-on with hospice however patient passed away before being seen by hospice and was pronounced  at 09:20 with a cause of death as aortic regurgitation.    Discharge time greater than 35 minutes. Greater than 50% of time spent on counseling patient, coordination of care, medication reconciliation, transmitting medications to pharmacy and clarifying plan of care with nursing staff and case management.    Pertinent Physical Exam At Time of Discharge  Physical Exam  Constitutional:       General: She is not in acute distress.     Appearance: She is ill-appearing.   HENT:      Head: Normocephalic and atraumatic.      Mouth/Throat:      Mouth: Mucous membranes are dry.     Cardiovascular:       Rate and Rhythm: Normal rate and regular rhythm.      Heart sounds: Normal heart sounds.   Pulmonary:      Breath sounds: Normal breath sounds. No wheezing, rhonchi or rales.   Abdominal:      General: Abdomen is flat. Bowel sounds are normal.      Palpations: Abdomen is soft.     Musculoskeletal:         General: No swelling or tenderness.      Cervical back: Neck supple. No tenderness.     Skin:     General: Skin is warm and dry.      Findings: No rash.     Neurological:      Cranial Nerves: No cranial nerve deficit.      Sensory: No sensory deficit.      Comments: unresponsive       Outpatient Follow-Up  Future Appointments   Date Time Provider Department Center   9/18/2025 11:15 AM ELY [id number] STRESS 1 ECHO/VASC 2 [id number] [place] [facility]   9/19/2025 11:15 AM Amisha Phelps MD [id number] [place]   9/25/2025 11:00 AM Pamela Barraza DPM [id number] [place]   10/23/2025 11:30 AM Ronald Lowry MD [id number] [place]         Orlando Melo MD

## 2025-07-18 NOTE — CARE PLAN
Plan to work on Hospice transition with family today.    Pt  , Hospice consult cancelled. Support provided.

## 2025-07-18 NOTE — ADDENDUM NOTE
Addendum  created 07/18/25 0846 by Jayden Weinberg, DO    Clinical Note Signed, Intraprocedure Blocks edited

## 2025-07-18 NOTE — CARE PLAN
Problem: Pain - Adult  Goal: Verbalizes/displays adequate comfort level or baseline comfort level  Outcome: Progressing     Problem: Safety - Adult  Goal: Free from fall injury  Outcome: Progressing     Problem: Discharge Planning  Goal: Discharge to home or other facility with appropriate resources  Outcome: Progressing     Problem: Chronic Conditions and Co-morbidities  Goal: Patient's chronic conditions and co-morbidity symptoms are monitored and maintained or improved  Outcome: Progressing     Problem: Nutrition  Goal: Nutrient intake appropriate for maintaining nutritional needs  Outcome: Progressing     Problem: Skin  Goal: Decreased wound size/increased tissue granulation at next dressing change  Outcome: Progressing  Goal: Participates in plan/prevention/treatment measures  Outcome: Progressing  Goal: Prevent/manage excess moisture  Outcome: Progressing  Goal: Prevent/minimize sheer/friction injuries  Outcome: Progressing  Flowsheets (Taken 7/18/2025 0555)  Prevent/minimize sheer/friction injuries: Turn/reposition every 2 hours/use positioning/transfer devices  Goal: Promote/optimize nutrition  Outcome: Progressing  Goal: Promote skin healing  Outcome: Progressing  Flowsheets (Taken 7/18/2025 0555)  Promote skin healing: Turn/reposition every 2 hours/use positioning/transfer devices     Problem: Safety - Medical Restraint  Goal: Remains free of injury from restraints (Restraint for Interference with Medical Device)  Outcome: Met  Goal: Free from restraint(s) (Restraint for Interference with Medical Device)  Outcome: Met     Problem: Knowledge Deficit  Goal: Patient/family/caregiver demonstrates understanding of disease process, treatment plan, medications, and discharge instructions  Outcome: Met     Problem: Mechanical Ventilation  Goal: Patient Will Maintain Patent Airway  Outcome: Met  Goal: Oral health is maintained or improved  Outcome: Met  Goal: ET tube will be managed safely  Outcome: Met  Goal:  Ability to express needs and understand communication  Outcome: Met  Goal: Mobility/activity is maintained at optimum level for patient  Outcome: Met   The patient's goals for the shift include      The clinical goals for the shift include patient will remain comfortable throughout this shift

## 2025-07-19 LAB
BACTERIA BLD CULT: NORMAL
BACTERIA BLD CULT: NORMAL

## 2025-09-18 ENCOUNTER — APPOINTMENT (OUTPATIENT)
Dept: CARDIOLOGY | Facility: CLINIC | Age: 85
End: 2025-09-18
Payer: MEDICARE

## 2025-10-23 ENCOUNTER — APPOINTMENT (OUTPATIENT)
Dept: PRIMARY CARE | Facility: CLINIC | Age: 85
End: 2025-10-23
Payer: MEDICARE

## (undated) DEVICE — SHEATH, GLIDESHEATH, SLENDER, 6FR 10CM

## (undated) DEVICE — Device

## (undated) DEVICE — CATHETER, DIAGNOSTIC, JUDKINS, LEFT, 5 FR-JL 4.0

## (undated) DEVICE — SHEATH, PINNACLE, W/.038 GUIDEWIRE, 10 CM,  6FR INTRODUCER, 6FR DIA, 2.5 CM DIALATOR

## (undated) DEVICE — MBRACE, WRIST SUPPORT WITH HOOK

## (undated) DEVICE — INFLATION DEVICE, BASIXCOMPAX, 30 ATM/BAR, 20ML, MAP152, 12IN TUBING

## (undated) DEVICE — SHEATH, PINNACLE, 10 CM,  8FR INTRODUCER, 8FR DIA, 2.5 CM DIALATOR

## (undated) DEVICE — CATHETER, VISTA BRITE TIP GUIDE, 6FR 0.070 JR4 ECO

## (undated) DEVICE — SHEATH, PINNACLE, 10 CM,  7FR INTRODUCER, 7FR DIA, 2.5 CM DIALATOR

## (undated) DEVICE — MANIFOLD KIT, CUSTOM (SJM)

## (undated) DEVICE — ACCESS KIT, S-MAK MINI, 4FR 10CM 0.018IN 40CM, NT/PT, ECHO ENHANCE NEEDLE

## (undated) DEVICE — TUBING, PRESSURE MONITOR, 24IN/61CM, FIXED FEMALE LUER

## (undated) DEVICE — INTRODUCER, SHEATH, PINNACLE, 7 FR, 10 CM

## (undated) DEVICE — GUIDEWIRE, RUN THROUGH WIRE, 180CM